# Patient Record
Sex: FEMALE | Race: WHITE | NOT HISPANIC OR LATINO | Employment: OTHER | URBAN - METROPOLITAN AREA
[De-identification: names, ages, dates, MRNs, and addresses within clinical notes are randomized per-mention and may not be internally consistent; named-entity substitution may affect disease eponyms.]

---

## 2017-03-31 ENCOUNTER — HOSPITAL ENCOUNTER (OUTPATIENT)
Dept: RADIOLOGY | Facility: MEDICAL CENTER | Age: 67
End: 2017-03-31
Attending: INTERNAL MEDICINE
Payer: COMMERCIAL

## 2017-03-31 DIAGNOSIS — Z00.00 ROUTINE GENERAL MEDICAL EXAMINATION AT A HEALTH CARE FACILITY: ICD-10-CM

## 2017-03-31 PROCEDURE — 4410556 CT-CARDIAC SCORING

## 2017-05-11 ENCOUNTER — HOSPITAL ENCOUNTER (OUTPATIENT)
Dept: RADIOLOGY | Facility: MEDICAL CENTER | Age: 67
End: 2017-05-11

## 2017-12-28 PROBLEM — M94.261 CHONDROMALACIA OF RIGHT KNEE: Status: ACTIVE | Noted: 2017-12-28

## 2017-12-28 PROBLEM — S83.241D OTHER TEAR OF MEDIAL MENISCUS, CURRENT INJURY, RIGHT KNEE, SUBSEQUENT ENCOUNTER: Status: ACTIVE | Noted: 2017-12-28

## 2018-06-14 ENCOUNTER — HOSPITAL ENCOUNTER (OUTPATIENT)
Dept: LAB | Facility: MEDICAL CENTER | Age: 68
End: 2018-06-14
Attending: INTERNAL MEDICINE
Payer: MEDICARE

## 2018-06-14 DIAGNOSIS — Z01.812 PRE-OPERATIVE LABORATORY EXAMINATION: ICD-10-CM

## 2018-06-14 DIAGNOSIS — Z01.810 PRE-OPERATIVE CARDIOVASCULAR EXAMINATION: ICD-10-CM

## 2018-06-14 LAB
ANION GAP SERPL CALC-SCNC: 6 MMOL/L (ref 0–11.9)
APTT PPP: 25.2 SEC (ref 24.7–36)
BASOPHILS # BLD AUTO: 1.2 % (ref 0–1.8)
BASOPHILS # BLD: 0.04 K/UL (ref 0–0.12)
BUN SERPL-MCNC: 14 MG/DL (ref 8–22)
CALCIUM SERPL-MCNC: 9 MG/DL (ref 8.5–10.5)
CHLORIDE SERPL-SCNC: 106 MMOL/L (ref 96–112)
CO2 SERPL-SCNC: 27 MMOL/L (ref 20–33)
CREAT SERPL-MCNC: 0.62 MG/DL (ref 0.5–1.4)
EKG IMPRESSION: NORMAL
EOSINOPHIL # BLD AUTO: 0.04 K/UL (ref 0–0.51)
EOSINOPHIL NFR BLD: 1.2 % (ref 0–6.9)
ERYTHROCYTE [DISTWIDTH] IN BLOOD BY AUTOMATED COUNT: 45 FL (ref 35.9–50)
EST. AVERAGE GLUCOSE BLD GHB EST-MCNC: 114 MG/DL
GLUCOSE SERPL-MCNC: 103 MG/DL (ref 65–99)
HBA1C MFR BLD: 5.6 % (ref 0–5.6)
HCT VFR BLD AUTO: 42.2 % (ref 37–47)
HGB BLD-MCNC: 14 G/DL (ref 12–16)
HIV 1+2 AB+HIV1 P24 AG SERPL QL IA: NON REACTIVE
IMM GRANULOCYTES # BLD AUTO: 0.01 K/UL (ref 0–0.11)
IMM GRANULOCYTES NFR BLD AUTO: 0.3 % (ref 0–0.9)
INR PPP: 0.88 (ref 0.87–1.13)
LYMPHOCYTES # BLD AUTO: 1.03 K/UL (ref 1–4.8)
LYMPHOCYTES NFR BLD: 29.9 % (ref 22–41)
MCH RBC QN AUTO: 29.6 PG (ref 27–33)
MCHC RBC AUTO-ENTMCNC: 33.2 G/DL (ref 33.6–35)
MCV RBC AUTO: 89.2 FL (ref 81.4–97.8)
MONOCYTES # BLD AUTO: 0.3 K/UL (ref 0–0.85)
MONOCYTES NFR BLD AUTO: 8.7 % (ref 0–13.4)
NEUTROPHILS # BLD AUTO: 2.02 K/UL (ref 2–7.15)
NEUTROPHILS NFR BLD: 58.7 % (ref 44–72)
NRBC # BLD AUTO: 0 K/UL
NRBC BLD-RTO: 0 /100 WBC
PLATELET # BLD AUTO: 203 K/UL (ref 164–446)
PMV BLD AUTO: 10.7 FL (ref 9–12.9)
POTASSIUM SERPL-SCNC: 3.7 MMOL/L (ref 3.6–5.5)
PROTHROMBIN TIME: 11.7 SEC (ref 12–14.6)
RBC # BLD AUTO: 4.73 M/UL (ref 4.2–5.4)
SCCMEC + MECA PNL NOSE NAA+PROBE: NEGATIVE
SCCMEC + MECA PNL NOSE NAA+PROBE: POSITIVE
SODIUM SERPL-SCNC: 139 MMOL/L (ref 135–145)
T3FREE SERPL-MCNC: 4.49 PG/ML (ref 2.4–4.2)
WBC # BLD AUTO: 3.4 K/UL (ref 4.8–10.8)

## 2018-06-14 PROCEDURE — 93010 ELECTROCARDIOGRAM REPORT: CPT | Performed by: INTERNAL MEDICINE

## 2018-06-14 PROCEDURE — 85730 THROMBOPLASTIN TIME PARTIAL: CPT

## 2018-06-14 PROCEDURE — 83036 HEMOGLOBIN GLYCOSYLATED A1C: CPT

## 2018-06-14 PROCEDURE — 36415 COLL VENOUS BLD VENIPUNCTURE: CPT

## 2018-06-14 PROCEDURE — 85025 COMPLETE CBC W/AUTO DIFF WBC: CPT

## 2018-06-14 PROCEDURE — 87641 MR-STAPH DNA AMP PROBE: CPT

## 2018-06-14 PROCEDURE — 87389 HIV-1 AG W/HIV-1&-2 AB AG IA: CPT

## 2018-06-14 PROCEDURE — 80048 BASIC METABOLIC PNL TOTAL CA: CPT

## 2018-06-14 PROCEDURE — 93005 ELECTROCARDIOGRAM TRACING: CPT

## 2018-06-14 PROCEDURE — 87640 STAPH A DNA AMP PROBE: CPT | Mod: XU

## 2018-06-14 PROCEDURE — 85610 PROTHROMBIN TIME: CPT

## 2018-06-14 PROCEDURE — 84481 FREE ASSAY (FT-3): CPT

## 2018-06-14 RX ORDER — LORAZEPAM 1 MG
2000 TABLET ORAL 2 TIMES DAILY
Status: ON HOLD | COMMUNITY
End: 2018-06-28

## 2018-06-14 RX ORDER — GLUCOSAMINE/CHONDR SU A SOD 750-600 MG
1 TABLET ORAL DAILY
COMMUNITY
End: 2023-04-17

## 2018-06-14 RX ORDER — MELOXICAM 7.5 MG/1
7.5 TABLET ORAL PRN
COMMUNITY
End: 2022-10-31

## 2018-06-14 RX ORDER — ESTRADIOL 0.05 MG/D
1 PATCH, EXTENDED RELEASE TRANSDERMAL
Status: ON HOLD | COMMUNITY
End: 2018-06-28

## 2018-06-14 NOTE — DISCHARGE PLANNING
DISCHARGE PLANNING NOTE - TOTAL JOINT     Procedure: Procedure(s):  KNEE ARTHROPLASTY TOTAL  Procedure Date: 6/27/2018  Insurance:  Payor: MEDICARE / Plan: MEDICARE PART A & B / Product Type: *No Product type* /   Equipment currently available at home? None, advised Care Chest  Steps into the home? 2  Steps within the home? 0  Toilet height? Standard  Type of shower? walk-in shower  Who will be with you during your recovery? spouse  Is Outpatient Physical Therapy set up after surgery? Yes   Did you take the Total Joint Class and where? Yes, at [facility]     Plan:

## 2018-06-14 NOTE — OR NURSING
"Preparing for your Procedure information\" sheet given to patient with verbal and written instructions. Patient instructed to continue prescribed medications through the day before surgery, instructed to take the following medications the day of surgery per anesthesia protocol  None.Pt denies any urinary frequency or burning. Pt instructed on use of CHG shower x3. MB  "

## 2018-06-27 ENCOUNTER — HOSPITAL ENCOUNTER (OUTPATIENT)
Facility: MEDICAL CENTER | Age: 68
End: 2018-06-28
Attending: ORTHOPAEDIC SURGERY | Admitting: ORTHOPAEDIC SURGERY
Payer: MEDICARE

## 2018-06-27 ENCOUNTER — APPOINTMENT (OUTPATIENT)
Dept: RADIOLOGY | Facility: MEDICAL CENTER | Age: 68
End: 2018-06-27
Attending: ORTHOPAEDIC SURGERY
Payer: MEDICARE

## 2018-06-27 DIAGNOSIS — M17.11 PRIMARY OSTEOARTHRITIS OF RIGHT KNEE: ICD-10-CM

## 2018-06-27 DIAGNOSIS — G89.18 POST-OP PAIN: ICD-10-CM

## 2018-06-27 PROCEDURE — 700111 HCHG RX REV CODE 636 W/ 250 OVERRIDE (IP)

## 2018-06-27 PROCEDURE — 700101 HCHG RX REV CODE 250: Performed by: PHYSICIAN ASSISTANT

## 2018-06-27 PROCEDURE — 502000 HCHG MISC OR IMPLANTS RC 0278: Performed by: ORTHOPAEDIC SURGERY

## 2018-06-27 PROCEDURE — 94760 N-INVAS EAR/PLS OXIMETRY 1: CPT

## 2018-06-27 PROCEDURE — 160048 HCHG OR STATISTICAL LEVEL 1-5: Performed by: ORTHOPAEDIC SURGERY

## 2018-06-27 PROCEDURE — 73560 X-RAY EXAM OF KNEE 1 OR 2: CPT | Mod: RT

## 2018-06-27 PROCEDURE — 160002 HCHG RECOVERY MINUTES (STAT): Performed by: ORTHOPAEDIC SURGERY

## 2018-06-27 PROCEDURE — 700111 HCHG RX REV CODE 636 W/ 250 OVERRIDE (IP): Performed by: PHYSICIAN ASSISTANT

## 2018-06-27 PROCEDURE — 160035 HCHG PACU - 1ST 60 MINS PHASE I: Performed by: ORTHOPAEDIC SURGERY

## 2018-06-27 PROCEDURE — 700101 HCHG RX REV CODE 250

## 2018-06-27 PROCEDURE — 160009 HCHG ANES TIME/MIN: Performed by: ORTHOPAEDIC SURGERY

## 2018-06-27 PROCEDURE — 502579 HCHG PACK, TOTAL KNEE: Performed by: ORTHOPAEDIC SURGERY

## 2018-06-27 PROCEDURE — 160036 HCHG PACU - EA ADDL 30 MINS PHASE I: Performed by: ORTHOPAEDIC SURGERY

## 2018-06-27 PROCEDURE — G0378 HOSPITAL OBSERVATION PER HR: HCPCS

## 2018-06-27 PROCEDURE — 700102 HCHG RX REV CODE 250 W/ 637 OVERRIDE(OP)

## 2018-06-27 PROCEDURE — 501838 HCHG SUTURE GENERAL: Performed by: ORTHOPAEDIC SURGERY

## 2018-06-27 PROCEDURE — 96375 TX/PRO/DX INJ NEW DRUG ADDON: CPT

## 2018-06-27 PROCEDURE — 96376 TX/PRO/DX INJ SAME DRUG ADON: CPT

## 2018-06-27 PROCEDURE — L8699 PROSTHETIC IMPLANT NOS: HCPCS | Performed by: ORTHOPAEDIC SURGERY

## 2018-06-27 PROCEDURE — A9270 NON-COVERED ITEM OR SERVICE: HCPCS | Performed by: PHYSICIAN ASSISTANT

## 2018-06-27 PROCEDURE — 160022 HCHG BLOCK: Performed by: ORTHOPAEDIC SURGERY

## 2018-06-27 PROCEDURE — 160041 HCHG SURGERY MINUTES - EA ADDL 1 MIN LEVEL 4: Performed by: ORTHOPAEDIC SURGERY

## 2018-06-27 PROCEDURE — 96365 THER/PROPH/DIAG IV INF INIT: CPT

## 2018-06-27 PROCEDURE — C1776 JOINT DEVICE (IMPLANTABLE): HCPCS | Performed by: ORTHOPAEDIC SURGERY

## 2018-06-27 PROCEDURE — 700105 HCHG RX REV CODE 258: Performed by: PHYSICIAN ASSISTANT

## 2018-06-27 PROCEDURE — 700102 HCHG RX REV CODE 250 W/ 637 OVERRIDE(OP): Performed by: PHYSICIAN ASSISTANT

## 2018-06-27 PROCEDURE — 160029 HCHG SURGERY MINUTES - 1ST 30 MINS LEVEL 4: Performed by: ORTHOPAEDIC SURGERY

## 2018-06-27 PROCEDURE — A9270 NON-COVERED ITEM OR SERVICE: HCPCS

## 2018-06-27 DEVICE — PATELLA GNS II RESURF  32MM: Type: IMPLANTABLE DEVICE | Site: KNEE | Status: FUNCTIONAL

## 2018-06-27 DEVICE — LGN PS HIGH FLEX XLPE SZ 3-4 10MM (1EA): Type: IMPLANTABLE DEVICE | Site: KNEE | Status: FUNCTIONAL

## 2018-06-27 DEVICE — CEMENT ORTHOPEDIC HV US  (10/PK): Type: IMPLANTABLE DEVICE | Site: KNEE | Status: FUNCTIONAL

## 2018-06-27 DEVICE — IMPLANT GNS II CMT TIB SIZE 4 RIGHT: Type: IMPLANTABLE DEVICE | Site: KNEE | Status: FUNCTIONAL

## 2018-06-27 DEVICE — IMPLANTABLE DEVICE: Type: IMPLANTABLE DEVICE | Site: KNEE | Status: FUNCTIONAL

## 2018-06-27 RX ORDER — ENEMA 19; 7 G/133ML; G/133ML
1 ENEMA RECTAL
Status: DISCONTINUED | OUTPATIENT
Start: 2018-06-27 | End: 2018-06-28 | Stop reason: HOSPADM

## 2018-06-27 RX ORDER — OXYCODONE HYDROCHLORIDE 10 MG/1
10 TABLET ORAL
Status: DISCONTINUED | OUTPATIENT
Start: 2018-06-27 | End: 2018-06-28 | Stop reason: HOSPADM

## 2018-06-27 RX ORDER — DEXTROSE MONOHYDRATE, SODIUM CHLORIDE, AND POTASSIUM CHLORIDE 50; 1.49; 4.5 G/1000ML; G/1000ML; G/1000ML
INJECTION, SOLUTION INTRAVENOUS CONTINUOUS
Status: DISCONTINUED | OUTPATIENT
Start: 2018-06-27 | End: 2018-06-28 | Stop reason: HOSPADM

## 2018-06-27 RX ORDER — BISACODYL 10 MG
10 SUPPOSITORY, RECTAL RECTAL
Status: DISCONTINUED | OUTPATIENT
Start: 2018-06-27 | End: 2018-06-28 | Stop reason: HOSPADM

## 2018-06-27 RX ORDER — CELECOXIB 200 MG/1
200 CAPSULE ORAL 2 TIMES DAILY WITH MEALS
Status: DISCONTINUED | OUTPATIENT
Start: 2018-06-28 | End: 2018-06-28 | Stop reason: HOSPADM

## 2018-06-27 RX ORDER — CELECOXIB 200 MG/1
CAPSULE ORAL
Status: COMPLETED
Start: 2018-06-27 | End: 2018-06-27

## 2018-06-27 RX ORDER — DIPHENHYDRAMINE HYDROCHLORIDE 50 MG/ML
25 INJECTION INTRAMUSCULAR; INTRAVENOUS EVERY 6 HOURS PRN
Status: DISCONTINUED | OUTPATIENT
Start: 2018-06-27 | End: 2018-06-28 | Stop reason: HOSPADM

## 2018-06-27 RX ORDER — ONDANSETRON 2 MG/ML
4 INJECTION INTRAMUSCULAR; INTRAVENOUS EVERY 4 HOURS PRN
Status: DISCONTINUED | OUTPATIENT
Start: 2018-06-27 | End: 2018-06-28 | Stop reason: HOSPADM

## 2018-06-27 RX ORDER — ACETAMINOPHEN 500 MG
750 TABLET ORAL EVERY 6 HOURS
Status: DISCONTINUED | OUTPATIENT
Start: 2018-06-27 | End: 2018-06-28 | Stop reason: HOSPADM

## 2018-06-27 RX ORDER — AMOXICILLIN 250 MG
1 CAPSULE ORAL
Status: DISCONTINUED | OUTPATIENT
Start: 2018-06-27 | End: 2018-06-28 | Stop reason: HOSPADM

## 2018-06-27 RX ORDER — TRANEXAMIC ACID
POWDER (GRAM) MISCELLANEOUS
Status: DISCONTINUED | OUTPATIENT
Start: 2018-06-27 | End: 2018-06-27 | Stop reason: HOSPADM

## 2018-06-27 RX ORDER — GABAPENTIN 300 MG/1
CAPSULE ORAL
Status: COMPLETED
Start: 2018-06-27 | End: 2018-06-27

## 2018-06-27 RX ORDER — ONDANSETRON 4 MG/1
4 TABLET, ORALLY DISINTEGRATING ORAL EVERY 4 HOURS PRN
Status: DISCONTINUED | OUTPATIENT
Start: 2018-06-27 | End: 2018-06-28 | Stop reason: HOSPADM

## 2018-06-27 RX ORDER — KETOROLAC TROMETHAMINE 30 MG/ML
INJECTION, SOLUTION INTRAMUSCULAR; INTRAVENOUS
Status: DISCONTINUED | OUTPATIENT
Start: 2018-06-27 | End: 2018-06-27 | Stop reason: HOSPADM

## 2018-06-27 RX ORDER — HYDROMORPHONE HYDROCHLORIDE 2 MG/ML
0.5 INJECTION, SOLUTION INTRAMUSCULAR; INTRAVENOUS; SUBCUTANEOUS
Status: DISCONTINUED | OUTPATIENT
Start: 2018-06-27 | End: 2018-06-28 | Stop reason: HOSPADM

## 2018-06-27 RX ORDER — HALOPERIDOL 5 MG/ML
INJECTION INTRAMUSCULAR
Status: COMPLETED
Start: 2018-06-27 | End: 2018-06-27

## 2018-06-27 RX ORDER — TRANEXAMIC ACID 100 MG/ML
INJECTION, SOLUTION INTRAVENOUS
Status: COMPLETED
Start: 2018-06-27 | End: 2018-06-27

## 2018-06-27 RX ORDER — SODIUM CHLORIDE, SODIUM LACTATE, POTASSIUM CHLORIDE, CALCIUM CHLORIDE 600; 310; 30; 20 MG/100ML; MG/100ML; MG/100ML; MG/100ML
INJECTION, SOLUTION INTRAVENOUS
Status: DISCONTINUED | OUTPATIENT
Start: 2018-06-27 | End: 2018-06-28 | Stop reason: HOSPADM

## 2018-06-27 RX ORDER — OXYCODONE HYDROCHLORIDE 5 MG/1
5 TABLET ORAL
Status: DISCONTINUED | OUTPATIENT
Start: 2018-06-27 | End: 2018-06-28 | Stop reason: HOSPADM

## 2018-06-27 RX ORDER — EPINEPHRINE 1 MG/ML(1)
AMPUL (ML) INJECTION
Status: DISCONTINUED | OUTPATIENT
Start: 2018-06-27 | End: 2018-06-27 | Stop reason: HOSPADM

## 2018-06-27 RX ORDER — ACETAMINOPHEN 500 MG
TABLET ORAL
Status: COMPLETED
Start: 2018-06-27 | End: 2018-06-27

## 2018-06-27 RX ORDER — AMOXICILLIN 250 MG
1 CAPSULE ORAL NIGHTLY
Status: DISCONTINUED | OUTPATIENT
Start: 2018-06-27 | End: 2018-06-28 | Stop reason: HOSPADM

## 2018-06-27 RX ORDER — LORATADINE 10 MG/1
10 TABLET ORAL PRN
Status: DISCONTINUED | OUTPATIENT
Start: 2018-06-27 | End: 2018-06-28 | Stop reason: HOSPADM

## 2018-06-27 RX ORDER — DIPHENHYDRAMINE HCL 25 MG
25 TABLET ORAL EVERY 6 HOURS PRN
Status: DISCONTINUED | OUTPATIENT
Start: 2018-06-27 | End: 2018-06-28 | Stop reason: HOSPADM

## 2018-06-27 RX ORDER — SCOLOPAMINE TRANSDERMAL SYSTEM 1 MG/1
1 PATCH, EXTENDED RELEASE TRANSDERMAL
Status: DISCONTINUED | OUTPATIENT
Start: 2018-06-27 | End: 2018-06-28 | Stop reason: HOSPADM

## 2018-06-27 RX ORDER — DIPHENHYDRAMINE HCL 25 MG
25 TABLET ORAL NIGHTLY PRN
Status: DISCONTINUED | OUTPATIENT
Start: 2018-06-28 | End: 2018-06-28 | Stop reason: HOSPADM

## 2018-06-27 RX ORDER — KETOROLAC TROMETHAMINE 30 MG/ML
15 INJECTION, SOLUTION INTRAMUSCULAR; INTRAVENOUS EVERY 6 HOURS
Status: COMPLETED | OUTPATIENT
Start: 2018-06-27 | End: 2018-06-28

## 2018-06-27 RX ORDER — ROPIVACAINE HYDROCHLORIDE 5 MG/ML
INJECTION, SOLUTION EPIDURAL; INFILTRATION; PERINEURAL
Status: DISCONTINUED | OUTPATIENT
Start: 2018-06-27 | End: 2018-06-27 | Stop reason: HOSPADM

## 2018-06-27 RX ORDER — DOCUSATE SODIUM 100 MG/1
100 CAPSULE, LIQUID FILLED ORAL 2 TIMES DAILY
Status: DISCONTINUED | OUTPATIENT
Start: 2018-06-27 | End: 2018-06-28 | Stop reason: HOSPADM

## 2018-06-27 RX ORDER — TRAMADOL HYDROCHLORIDE 50 MG/1
50 TABLET ORAL EVERY 4 HOURS PRN
Status: DISCONTINUED | OUTPATIENT
Start: 2018-06-27 | End: 2018-06-28 | Stop reason: HOSPADM

## 2018-06-27 RX ORDER — POLYETHYLENE GLYCOL 3350 17 G/17G
1 POWDER, FOR SOLUTION ORAL 2 TIMES DAILY PRN
Status: DISCONTINUED | OUTPATIENT
Start: 2018-06-27 | End: 2018-06-28 | Stop reason: HOSPADM

## 2018-06-27 RX ORDER — LIDOCAINE HYDROCHLORIDE 10 MG/ML
INJECTION, SOLUTION EPIDURAL; INFILTRATION; INTRACAUDAL; PERINEURAL
Status: COMPLETED
Start: 2018-06-27 | End: 2018-06-27

## 2018-06-27 RX ORDER — OXYCODONE HCL 5 MG/5 ML
SOLUTION, ORAL ORAL
Status: COMPLETED
Start: 2018-06-27 | End: 2018-06-27

## 2018-06-27 RX ORDER — DIAZEPAM 5 MG/1
5 TABLET ORAL EVERY 6 HOURS PRN
Status: DISCONTINUED | OUTPATIENT
Start: 2018-06-27 | End: 2018-06-28 | Stop reason: HOSPADM

## 2018-06-27 RX ORDER — HALOPERIDOL 5 MG/ML
1 INJECTION INTRAMUSCULAR EVERY 6 HOURS PRN
Status: DISCONTINUED | OUTPATIENT
Start: 2018-06-27 | End: 2018-06-28 | Stop reason: HOSPADM

## 2018-06-27 RX ADMIN — LIDOCAINE HYDROCHLORIDE 0.5 ML: 10 INJECTION, SOLUTION EPIDURAL; INFILTRATION; INTRACAUDAL; PERINEURAL at 10:30

## 2018-06-27 RX ADMIN — Medication 1 G: at 12:21

## 2018-06-27 RX ADMIN — GABAPENTIN 300 MG: 300 CAPSULE ORAL at 10:30

## 2018-06-27 RX ADMIN — ACETAMINOPHEN 1000 MG: 500 TABLET, COATED ORAL at 10:30

## 2018-06-27 RX ADMIN — ASPIRIN 81 MG: 81 TABLET, COATED ORAL at 22:06

## 2018-06-27 RX ADMIN — SODIUM CHLORIDE 2 G: 9 INJECTION, SOLUTION INTRAVENOUS at 22:05

## 2018-06-27 RX ADMIN — KETOROLAC TROMETHAMINE 15 MG: 30 INJECTION, SOLUTION INTRAMUSCULAR at 23:45

## 2018-06-27 RX ADMIN — TRANEXAMIC ACID 1000 MG: 100 INJECTION, SOLUTION INTRAVENOUS at 14:36

## 2018-06-27 RX ADMIN — CELECOXIB 200 MG: 200 CAPSULE ORAL at 10:35

## 2018-06-27 RX ADMIN — SENNOSIDES AND DOCUSATE SODIUM 1 TABLET: 8.6; 5 TABLET ORAL at 22:06

## 2018-06-27 RX ADMIN — ACETAMINOPHEN 750 MG: 500 TABLET, FILM COATED ORAL at 23:42

## 2018-06-27 RX ADMIN — SODIUM CHLORIDE, SODIUM LACTATE, POTASSIUM CHLORIDE, CALCIUM CHLORIDE: 600; 310; 30; 20 INJECTION, SOLUTION INTRAVENOUS at 10:30

## 2018-06-27 RX ADMIN — HALOPERIDOL LACTATE 1 MG: 5 INJECTION, SOLUTION INTRAMUSCULAR at 13:50

## 2018-06-27 RX ADMIN — ACETAMINOPHEN 750 MG: 500 TABLET, FILM COATED ORAL at 18:02

## 2018-06-27 RX ADMIN — POTASSIUM CHLORIDE, DEXTROSE MONOHYDRATE AND SODIUM CHLORIDE: 150; 5; 450 INJECTION, SOLUTION INTRAVENOUS at 18:03

## 2018-06-27 RX ADMIN — KETOROLAC TROMETHAMINE 15 MG: 30 INJECTION, SOLUTION INTRAMUSCULAR at 18:03

## 2018-06-27 RX ADMIN — OXYCODONE HYDROCHLORIDE 5 MG: 5 SOLUTION ORAL at 15:20

## 2018-06-27 ASSESSMENT — PAIN SCALES - GENERAL
PAINLEVEL_OUTOF10: 0
PAINLEVEL_OUTOF10: 4
PAINLEVEL_OUTOF10: ASSUMED PAIN PRESENT
PAINLEVEL_OUTOF10: ASSUMED PAIN PRESENT
PAINLEVEL_OUTOF10: 2

## 2018-06-27 ASSESSMENT — PATIENT HEALTH QUESTIONNAIRE - PHQ9
2. FEELING DOWN, DEPRESSED, IRRITABLE, OR HOPELESS: NOT AT ALL
1. LITTLE INTEREST OR PLEASURE IN DOING THINGS: NOT AT ALL
SUM OF ALL RESPONSES TO PHQ9 QUESTIONS 1 AND 2: 0

## 2018-06-27 ASSESSMENT — COPD QUESTIONNAIRES
HAVE YOU SMOKED AT LEAST 100 CIGARETTES IN YOUR ENTIRE LIFE: NO/DON'T KNOW
DURING THE PAST 4 WEEKS HOW MUCH DID YOU FEEL SHORT OF BREATH: NONE/LITTLE OF THE TIME
COPD SCREENING SCORE: 2
DO YOU EVER COUGH UP ANY MUCUS OR PHLEGM?: NO/ONLY WITH OCCASIONAL COLDS OR INFECTIONS

## 2018-06-27 ASSESSMENT — LIFESTYLE VARIABLES
EVER_SMOKED: NEVER
EVER_SMOKED: NEVER
ALCOHOL_USE: NO

## 2018-06-27 NOTE — OP REPORT
Date of Surgery:  6-17-18  Pre-operative Diagnosis:  right knee arthritis    Post-operative Diagnosis:  right knee arthritis    Procedure:  right knee replacement    Implants used:  A Smith Nephew Legion PS total knee arthroplasty system with the following components  Femur: 5 Narrow Oxinium PS  Tibia: 4   Polyethylene: 10 mm PS  Patella: 32 round  Fixation: 2 packages of Simplex HV    Surgeon:  Sindi Ochoa MD    1st Assistant:   WAN Garcia    Anesthesiologist:   Kole    EBL:  Minimal     Specimen:  None    Findings: Severe tricompartmental OA; scar tissue in lateral gutter, suprapatellar loose bodies    Indications for procedure:  This patient is a 67 y.o. female with a long standing history of right knee arthritis. The patient had failed conservative therapy including anti-inflammatory medications, activity modifications, injections, physical therapy and assistive devices. She was indicated for a right total knee replacement. The patient expressed an understanding of the risks of pain, bleeding, infection, dislocation, malalignment, need for further surgery, damage to surrounding structures, neurovascular compromise, blood clots, leg-length discrepancy both apparent and actual, anesthetic complications, and serious medical consequences including but not limited to heart attack, stroke or even death. It was explained that the implants are man-made products and thus subject to possible failure.  The patient expressed an understanding and wished to proceed. Specific risks based on the patient's medical history were addressed. A clearance was obtained by the medical physicians and the patient was taken to the operating room on the day of surgery for the above named procedure.     Description of procedure:  The patient was met in the pre-operative holding area. The right knee was marked as the appropriate surgical site with indelible ink. They were taken to the operating room where the anesthesia department  "started a adductor/general for intraoperative and post-operative anesthesia and pain control. The patient was placed on the OR table and a tourniquet was placed high on the operative thigh. All bony prominences were padded appropriately. The right knee was prepped and draped in a standard sterile surgical fashion. A time out procedure was called to verify the side and site of surgery, the proposed surgical procedure, and the administration of pre-operative antibiotics. After successful completion of the time out procedure, our attention was turned to the right knee.  The tourniquet was inflated after exsanguination with an Esmarch bandage. The knee was flexed and a straight incision was made just medial to the midline. The capsule of the knee was cleared and a midvastus arthrotomy was performed. The patella was subluxated laterally and a medial release was performed to properly visualize the posteromedial aspect of the tibial plateau. The knee was extended, the patellar tendon was protected and the infrapatellar fat pad was excised. A lateral release was performed. The patella was turned on its side and held in place with two penetrating towel clips. A free-hand patellar cut was made, the patella was sized and the appropriate lug holes were drilled. The patella was subluxed, the knee was flexed. The tourniquet was released. Hemostasis was obtained. Each hemijoint was cleared of soft tissue. We noted several small loose bodies in the suprapatellar pouch which were removed. The ACL was removed. The femoral canal was entered with the step drill and the distal femoral cutting guide was pinned in place in 6 degrees of valgus. The distal cut was made and the bony pieces were removed. The femur was sized to a size 5 and the appropriate cutting jig was pinned in place in 3 degrees of external rotation. The bony cuts were made, we noted a \"grand piano sign\" anteriorly. The box for the PS post was cut in the standard fashion. " The patient's anatomy was noted to be appropriate for the Narrow implant, and this was chosen   Now the femur was retracted posteriorly with a lap sponge to protect the intercondylar area. The proximal tibia was exposed, the depth of our resection was based on taking 2 mm off the low side of the bone. We used the intramedullary drill to enter the tibial canal and set our alignment based on the patient's anatomy.  We first set our depth and then our checked our varus/valgus alignment with the extramedullary guide kaitlynn. We made our bony cuts and removed the tibial piece en bloc. The laminar spreaders were placed and the hypercurved osteotomes were used to remove posterior femoral osteophytes. The ligaments were balanced in flexion and extension.   The tibia was then sized to a size 4 and the trial component was placed in the proper amount of external rotation. A trial femoral component was placed and with the 10 mm PS polyethylene we noted full extension without recurvatum and greater than 125 degrees of flexion with appropriate patellar tracking. At this point we removed the trial components and thoroughly irrigated the wound. Osteophytes were removed. The tourniquet was reinflated.  The real components were opened in a sterile fashion on the back table and then cemented into place sequentially, first the femur, then the tibia, then the patella. The cement was allowed to cure with the trial polyethylene component in place. After the cement had hardened and all excess cement was removed, the knee was taken through a range of motion. Again we noted full extension and greater than 125 degrees of flexion with appropriate varus/valgus stability and patellar tracking. Therefore the real polyethylene was opened and inserted into the tibial tray. An audible click was heard as it engaged the tibia. Now a dilute betadine solution with 3 grams of tranexamic acid was instilled into the knee for 3 minutes before being  pulse-lavaged out. The knee was flexed, the arthrotomy was closed with #1 Quill, followed by 2-0 Vicryl in the deep dermal layer in a buried interrupted fashion. The skin was closed with 3-0 monocryl in a running subcuticular fashion, and a sterile Prevena dressing was applied. The patient is currently in the operating room awaiting reversal of anesthesia.

## 2018-06-27 NOTE — PROGRESS NOTES
Pt to floor.  Pt is alert and oriented x 4.  Initial VSS.  R knee incision CDI, Prevena in place, polar ice applied. Pain and nausea tolerable. +1 pedal pulse.  Pt able to pull leg up and move foot slightly.  CO numbness.  Updated w/ POC. WIll continue with care.

## 2018-06-27 NOTE — OR NURSING
"1339: Pt arrived post R total knee procedure, Pt awake upon arrival/groggy, Respirations unlabored/sats approp on NC, Anesthesia report/OR RN had off, Pt has ace wrap to R knee/Prevena in place, +L pedal pulse, Pt did have R add canal nerve block/able to move foot slightly and pull leg up /states \"leg feels numb,\" IVF infusing/SCD's in use  1350: Pain is minimal, Slight nausea/medicated  1405: Nausea improved, Pain is minimal, VSS  "

## 2018-06-27 NOTE — OR NURSING
1515: Radiology here, Pain increasing slightly-rates 4/10-medicated/see MAR  1530: Pt meets criteria for [facility] transfer, Attempting to call report, VSS, No nausea/pain min and tolerable

## 2018-06-27 NOTE — OR NURSING
1427- recieved report from Martell RESENDIZ. Pt resting with eyes closed.  1459- report to Martell RESENDIZ.

## 2018-06-27 NOTE — OR NURSING
1004: Brought patient back to pre-op and assumed care.  1103: Patient allergies and NPO status verified, home medication reconciliation completed and belongings secured. Patient verbalizes understanding of pain scale, expected course of stay and plan of care. Surgical site verified with patient. IV access established. Sequentials placed on legs.

## 2018-06-28 VITALS
HEIGHT: 63 IN | TEMPERATURE: 98.7 F | DIASTOLIC BLOOD PRESSURE: 46 MMHG | OXYGEN SATURATION: 100 % | WEIGHT: 151.9 LBS | SYSTOLIC BLOOD PRESSURE: 118 MMHG | BODY MASS INDEX: 26.91 KG/M2 | HEART RATE: 64 BPM | RESPIRATION RATE: 18 BRPM

## 2018-06-28 PROBLEM — M17.11 PRIMARY OSTEOARTHRITIS OF RIGHT KNEE: Status: ACTIVE | Noted: 2018-06-28

## 2018-06-28 PROBLEM — G89.18 POST-OP PAIN: Status: ACTIVE | Noted: 2018-06-28

## 2018-06-28 LAB
HCT VFR BLD AUTO: 37 % (ref 37–47)
HGB BLD-MCNC: 12.1 G/DL (ref 12–16)

## 2018-06-28 PROCEDURE — 97165 OT EVAL LOW COMPLEX 30 MIN: CPT

## 2018-06-28 PROCEDURE — G8978 MOBILITY CURRENT STATUS: HCPCS | Mod: CJ

## 2018-06-28 PROCEDURE — 96376 TX/PRO/DX INJ SAME DRUG ADON: CPT

## 2018-06-28 PROCEDURE — 36415 COLL VENOUS BLD VENIPUNCTURE: CPT

## 2018-06-28 PROCEDURE — 97535 SELF CARE MNGMENT TRAINING: CPT

## 2018-06-28 PROCEDURE — G8989 SELF CARE D/C STATUS: HCPCS | Mod: CJ

## 2018-06-28 PROCEDURE — A9270 NON-COVERED ITEM OR SERVICE: HCPCS | Performed by: PHYSICIAN ASSISTANT

## 2018-06-28 PROCEDURE — 85018 HEMOGLOBIN: CPT

## 2018-06-28 PROCEDURE — 700111 HCHG RX REV CODE 636 W/ 250 OVERRIDE (IP): Performed by: PHYSICIAN ASSISTANT

## 2018-06-28 PROCEDURE — G8979 MOBILITY GOAL STATUS: HCPCS | Mod: CJ

## 2018-06-28 PROCEDURE — 85014 HEMATOCRIT: CPT

## 2018-06-28 PROCEDURE — G8987 SELF CARE CURRENT STATUS: HCPCS | Mod: CJ

## 2018-06-28 PROCEDURE — 700102 HCHG RX REV CODE 250 W/ 637 OVERRIDE(OP): Performed by: PHYSICIAN ASSISTANT

## 2018-06-28 PROCEDURE — 700112 HCHG RX REV CODE 229: Performed by: PHYSICIAN ASSISTANT

## 2018-06-28 PROCEDURE — G8980 MOBILITY D/C STATUS: HCPCS | Mod: CJ

## 2018-06-28 PROCEDURE — 97161 PT EVAL LOW COMPLEX 20 MIN: CPT

## 2018-06-28 PROCEDURE — G8988 SELF CARE GOAL STATUS: HCPCS | Mod: CJ

## 2018-06-28 PROCEDURE — 700105 HCHG RX REV CODE 258: Performed by: PHYSICIAN ASSISTANT

## 2018-06-28 PROCEDURE — G0378 HOSPITAL OBSERVATION PER HR: HCPCS

## 2018-06-28 RX ORDER — OXYCODONE HYDROCHLORIDE 5 MG/1
5 TABLET ORAL EVERY 8 HOURS PRN
Qty: 80 TAB | Refills: 0 | Status: SHIPPED | OUTPATIENT
Start: 2018-06-28 | End: 2018-07-12

## 2018-06-28 RX ORDER — ASPIRIN 81 MG/1
81 TABLET ORAL 2 TIMES DAILY
Qty: 60 TAB | Refills: 0 | Status: SHIPPED | OUTPATIENT
Start: 2018-06-28 | End: 2022-06-29

## 2018-06-28 RX ORDER — ONDANSETRON 4 MG/1
4 TABLET, ORALLY DISINTEGRATING ORAL EVERY 4 HOURS PRN
Qty: 10 TAB | Refills: 0 | Status: SHIPPED | OUTPATIENT
Start: 2018-06-28 | End: 2022-10-31

## 2018-06-28 RX ORDER — ACETAMINOPHEN 500 MG
750 TABLET ORAL EVERY 6 HOURS
Qty: 30 TAB | Refills: 0 | Status: SHIPPED | OUTPATIENT
Start: 2018-06-28 | End: 2023-01-17

## 2018-06-28 RX ORDER — DIAZEPAM 5 MG/1
5 TABLET ORAL EVERY 8 HOURS PRN
Qty: 60 TAB | Refills: 0 | Status: SHIPPED | OUTPATIENT
Start: 2018-06-28 | End: 2018-07-12

## 2018-06-28 RX ORDER — TRAMADOL HYDROCHLORIDE 50 MG/1
50-100 TABLET ORAL EVERY 8 HOURS PRN
Qty: 80 TAB | Refills: 2 | Status: SHIPPED | OUTPATIENT
Start: 2018-06-28 | End: 2018-07-12

## 2018-06-28 RX ORDER — PSEUDOEPHEDRINE HCL 30 MG
100 TABLET ORAL 2 TIMES DAILY
Qty: 60 CAP | Refills: 0 | Status: SHIPPED | OUTPATIENT
Start: 2018-06-28 | End: 2022-10-31

## 2018-06-28 RX ADMIN — KETOROLAC TROMETHAMINE 15 MG: 30 INJECTION, SOLUTION INTRAMUSCULAR at 12:22

## 2018-06-28 RX ADMIN — OXYCODONE HYDROCHLORIDE 5 MG: 5 TABLET ORAL at 12:18

## 2018-06-28 RX ADMIN — KETOROLAC TROMETHAMINE 15 MG: 30 INJECTION, SOLUTION INTRAMUSCULAR at 06:41

## 2018-06-28 RX ADMIN — DOCUSATE SODIUM 100 MG: 100 CAPSULE, LIQUID FILLED ORAL at 06:40

## 2018-06-28 RX ADMIN — ASPIRIN 81 MG: 81 TABLET, COATED ORAL at 09:56

## 2018-06-28 RX ADMIN — ACETAMINOPHEN 750 MG: 500 TABLET, FILM COATED ORAL at 12:19

## 2018-06-28 RX ADMIN — OXYCODONE HYDROCHLORIDE 5 MG: 5 TABLET ORAL at 03:58

## 2018-06-28 RX ADMIN — SODIUM CHLORIDE 2 G: 9 INJECTION, SOLUTION INTRAVENOUS at 03:58

## 2018-06-28 ASSESSMENT — PAIN SCALES - GENERAL
PAINLEVEL_OUTOF10: 5
PAINLEVEL_OUTOF10: 3
PAINLEVEL_OUTOF10: 2

## 2018-06-28 ASSESSMENT — GAIT ASSESSMENTS
DISTANCE (FEET): 200
ASSISTIVE DEVICE: FRONT WHEEL WALKER
GAIT LEVEL OF ASSIST: STAND BY ASSIST

## 2018-06-28 ASSESSMENT — ACTIVITIES OF DAILY LIVING (ADL): TOILETING: INDEPENDENT

## 2018-06-28 NOTE — DISCHARGE SUMMARY
Edilma Taylor was admitted on 6/27/2018 for ITBS AND KNEE PAIN  Iliotibial band syndrome of right side  Right knee pain  Iliotibial band syndrome of right side  Right knee pain  Iliotibial band syndrome of right side  Right knee pain  Patient was diagnosed with severe degenerative arthritis in the right knee and underwent a right total knee arthroplasty by Dr. Sindi Ochoa on the date of admission. Please see dictated operative note for further information.    Hospital course: standard    The patient has done well, with no complications.  Patient denies chest pain, calf pain or shortness of breath.   Pain is well-controlled at present.  Patient is ambulating well with the use of an assistive device, and progressing in physical therapy.   Patient is neurologically and vascularly intact with palpable pedal pulses bilaterally.   Narcotic dosages were chosen by taking into account the the patient's previous history of opoid use and to ensure proper pain control after surgery    Discharge date: 6-28-18    Patient is being discharged to home after am physical therapy.     Allergies:  Penicillins; Morphine; Sulfa drugs; and Kenalog       Medication List      START taking these medications      Instructions   acetaminophen 500 MG Tabs  Commonly known as:  TYLENOL   Take 1.5 Tabs by mouth every 6 hours.  Dose:  750 mg     aspirin 81 MG EC tablet   Take 1 Tab by mouth 2 times a day.  Dose:  81 mg     diazePAM 5 MG Tabs  Commonly known as:  VALIUM   Take 1 Tab by mouth every 8 hours as needed (for muscle spasms) for up to 14 days.  Dose:  5 mg     docusate sodium 100 MG Caps   Take 100 mg by mouth 2 times a day.  Dose:  100 mg     ondansetron 4 MG Tbdp  Commonly known as:  ZOFRAN ODT   Take 1 Tab by mouth every four hours as needed for Nausea.  Dose:  4 mg     oxyCODONE immediate-release 5 MG Tabs  Commonly known as:  ROXICODONE   Take 1 Tab by mouth every 8 hours as needed (for pain; can be alternated with Tramadol  for pain control) for up to 14 days.  Dose:  5 mg     tramadol 50 MG Tabs  Commonly known as:  ULTRAM   Take 1-2 Tabs by mouth every 8 hours as needed (for pain; can be alternated with Oxycodone for pain control) for up to 14 days.  Dose:   mg        CONTINUE taking these medications      Instructions   ascorbic acid 500 MG Tabs  Commonly known as:  ascorbic acid   Take 1,000 mg by mouth every day.  Dose:  1000 mg     Grape Seed 250-50 MG Caps   Take 1 Tab by mouth every day.  Dose:  1 Tab     JOINT FORMULA PO   Take 1 Tab by mouth every day.  Dose:  1 Tab     loratadine 10 MG Tabs  Commonly known as:  CLARITIN   Take 10 mg by mouth as needed.  Dose:  10 mg     meloxicam 7.5 MG Tabs  Commonly known as:  MOBIC   Take 7.5 mg by mouth as needed.  Dose:  7.5 mg     NATURE-THROID 65 MG tablet  Generic drug:  thyroid   Take 65 mg by mouth every day.  Dose:  65 mg     Non Formulary Request   Take 1 Cap by mouth every day. EYE VITAMIN  Dose:  1 Cap     Non Formulary Request   Take 1 Cap by mouth every day. VITAMIN D + CALCIUM  Dose:  1 Cap     PROBIOTIC DAILY PO   Take 1 Cap by mouth every day.  Dose:  1 Cap     * therapeutic multivitamin-minerals Tabs   Take 1 Tab by mouth every day.  Dose:  1 Tab     * HAIR SKIN AND NAILS FORMULA PO   Take 3 Tabs by mouth every day.  Dose:  3 Tab        * This list has 2 medication(s) that are the same as other medications prescribed for you. Read the directions carefully, and ask your doctor or other care provider to review them with you.            STOP taking these medications    CLA 1000 MG Caps     estradiol 0.05 MG/24HR Pttw  Commonly known as:  VIVELLE DOT     TURMERIC PO            Discharge Instructions:     Patient is instructed to ambulate and weight bear as tolerated with the use of an assistive device, and to continue physical therapy exercises given during this hospital stay. Strict posterior hip precautions are to be observed for patients who underwent a total hip  replacement.   Patient is to ice and elevate the surgical leg regularly, with pillows under the ankle, nothing is to be placed under the knee.   Patient was given detailed wound care instructions, and will leave the silver dressing on until first post-op visit.   ASA twice daily for DVT prophylaxis.  Patient is to follow up with Dr. Ochoa's office in 1-2 weeks.

## 2018-06-28 NOTE — PROGRESS NOTES
S:  s/p right TKA  Pain controlled  No N/V  No Chest Pain/SOB  Afebrile  Exam:    NAD A& O x3    RLE: +DF/PF/EHL SILT L4/L5/S1, motor function improved from immediate post op exam  Compartments soft  LLE:  +DF/PF/EHL SILT L4/L5/S1    Plan:   Continue standard plan of care  Weight bearing: as tolerated with walker/cane  DVT prophylaxis: SCD/Teds + ASA  Dispo: home today after PT

## 2018-06-28 NOTE — CARE PLAN
Problem: Venous Thromboembolism (VTW)/Deep Vein Thrombosis (DVT) Prevention:  Goal: Patient will participate in Venous Thrombosis (VTE)/Deep Vein Thrombosis (DVT)Prevention Measures  Outcome: PROGRESSING AS EXPECTED   06/27/18 2100 06/27/18 2300   Mechanical/VTE Prophylaxis   Mechanical Prophylaxis  --  SCDs, Sequential Compression Device;AV Foot Pumps   AV Foot Pumps --  On   SCDs, Sequential Compression Device --  On   OTHER   Risk Assessment Score 2 --    VTE RISK Moderate --    Pharmacologic Prophylaxis Used (ASA) --        Problem: Pain Management  Goal: Pain level will decrease to patient's comfort goal  Outcome: PROGRESSING AS EXPECTED   06/28/18 0321   OTHER   Non Verbal Scale  Calm;Sleeping;Unlabored Breathing

## 2018-06-28 NOTE — PROGRESS NOTES
Post op check  s/p right TKA  Pain controlled  Some numbness distally  NAD  Weak dorsi/plantarflexion  Toes warm/well perfused  Compartments soft  Continue post op protocol, ok to ambulate with assistance and walker.

## 2018-06-28 NOTE — PROGRESS NOTES
"Total Joint Replacement Program Rounding     Inpatient bedside rounding completed to address quality of care provided by total joint replacement program IDT and overall patient experience at [facility].    Patient Satisfaction addressed. Patient/family updated on POC during hospital stay.  No further questions/concerns at this time. Please see \"MyRounding\" for further details of rounding discussion. RN updated.         "

## 2018-06-28 NOTE — DISCHARGE INSTRUCTIONS
Dr. Ochoa's Discharge Instructions:   The first week after your joint replacement is a time to recover from the surgery. We expect light exercise to keep you active and mobile. Dr. Ochoa requires a walker or cane for most patients in the first few days following surgery to try to prevent falls or complications.     Most patients are prescribed two medications for pain control: a narcotic such as Oxycodone or Hydrocodone and a milder medication called Tramadol. These can be alternated for pain control, and the priority is to decrease use of the stronger narcotic as soon as tolerated.   Take your pain medication as appropriate to ensure that your pain is not limiting your recovery. You'll be seen in clinic in 7-10 days (this appointment has already been made, call our office if you're unsure of the time). At that time, we'll prescribe your physical therapy to help with the recovery phase.     -Keep dressing clean and dry. Leave dressing in place until follow up. If you have an incisional vac dressing, the battery may die before your first post operative appointment, but you can leave the surgical dressing in place and it will be removed at your clinic visit. Call the office if you notice drainage from the surgical dressing.     -OK to shower, keep dressing in place. Pat dressing dry, do not rub incision     -Do not soak incision in bath, hot tub or pool     -Follow up with Dr. Ochoa at regularly scheduled time     -Call [facility] office at [phone] for questions     -Weight bearing status: as tolerated with walker/cane for support     -Take medication as prescribed for DVT prophylaxis    Discharge Instructions    Discharged to home by car with relative. Discharged via wheelchair, hospital escort: Yes.  Special equipment needed: Not Applicable    Be sure to schedule a follow-up appointment with your primary care doctor or any specialists as instructed.     Discharge Plan:   Diet Plan: Discussed  Activity Level:  Discussed  Confirmed Follow up Appointment: Patient to Call and Schedule Appointment  Confirmed Symptoms Management: Discussed  Medication Reconciliation Updated: Yes    I understand that a diet low in cholesterol, fat, and sodium is recommended for good health. Unless I have been given specific instructions below for another diet, I accept this instruction as my diet prescription.   Other diet: Regular    Special Instructions: Discharge instructions for the Orthopedic Patient    Follow up with Primary Care Physician within 2 weeks of discharge to home, regarding:  Review of medications and diagnostic testing.  Surveillance for medical complications.  Workup and treatment of osteoporosis, if appropriate.     -Is this a Joint Replacement patient? Yes Total Joint Knee Replacement Discharge Instructions    Pain  - The goal is to slowly wean off the prescription pain medicine.  - Ice can be used for pain control.  20 minutes at a time is recommended, and never directly against your skin or incision.  - Most patients are off the pain pills by 3 weeks; others may require a low level of pain medications for many months.  If your pain continues to be severe, follow up with your physician.  Infection    Knee joint infections; occur in fewer than 2% of patients. The most common causes of infection following total knee replacement surgery are from bacteria that enter the bloodstream during dental procedures, urinary tract infections, or skin infections. These bacteria can lodge around your knee replacement and cause an infection.  - Keep the incision as clean and dry as possible.  - Always wash your hands before touching your incision.  - Skin infections tend to develop around 7-10 days after surgery; most can be treated with oral antibiotics.  - Dental Care should be delayed for 3 months after surgery, your surgeon recommends taking a dose of antibiotics 1 hour prior to any dental procedure. After 2 years, most surgeons  recommend antibiotics only before an extensive procedure.  Ask your surgeon what he recommends.  - Signs and symptoms of infection can include:  low grade fever, redness, pain, swelling and drainage from your incision.  Notify your surgeon immediately if you develop any of these symptoms.  Other instructions  - Bowel habits - constipation is extremely common and is caused by a combination of anesthesia, lack of mobility and pain medicine.  Use stool softeners or laxatives if necessary. It is important not to ignore this problem, as bowel obstructions can be a serious complication after joint replacement surgery.  - Mood/Energy Level - Many patients experience a lack of energy and endurance for up to 2-3 months after surgery.  Some may also feel down and can even become depressed.  This is likely due to the postoperative anemia, change in activity level, lack of sleep, pain medicine and just the emotional reaction to the surgery itself that is a big disruption in a person’s life.  This usually passes.  If symptoms persist, follow up with your primary physician.  - Returning to work - Your surgeon will give you more specific instructions. Depending on the type of activities you perform, it may be 6 to 8 weeks before you return to work.   Generally, if you work a sedentary job requiring little standing or walking, most patients may return within 2-6 weeks.  Manual labor jobs involving walking, lifting and standing may take longer. Your surgeon’s office can provide a release to part-time or light duty work early on in your recovery and progress you to full duty as able.    - Driving - If your left knee was replaced and you have an automatic transmission, you may be able to begin driving in a week or so, provided you are no longer taking narcotic pain medication. If your right knee was replaced, avoid driving for 6 to 8 weeks. Remember that your reflexes may not be as sharp as before your surgery. Ask your surgeon for  specific instructions.   - Avoiding falls - A fall during the first few weeks after surgery can damage your new knee and may result in a need for further surgery.   throw rugs and tack down loose carpeting.  Be aware of floor hazards such as pets, small objects or uneven surfaces.    - [employer] Metal Detectors - The sensitivity of metal detectors varies and it is likely that your prosthesis will cause an alarm.  Inform the  of your artificial joint.  Diet  - Resume your normal diet as tolerated.  - It is important to achieve a healthy nutritional status by eating a well balanced diet on a regular basis.  - Your physician may recommend that you take iron and vitamin supplements.   - Continue to drink plenty of fluids.  Shower/Bathing  - You may shower as soon as you get home from the hospital unless otherwise instructed.  - Keep your incision out of water.  To keep the incision dry when showering, cover it with a plastic bag or plastic wrap.  - Pat incision dry if it gets wet.  Don’t rub.  - Do not submerge in a bath until staples are out and the incision is completely healed. (Approximately 6-8 weeks)  Dressing Change:  Procedure (if recommended by your physician)  - Wash hands.  - Open all new dressing change materials.  - Remove old dressing and discard.  - Inspect incision for redness, increase in clear drainage, yellow/green drainage, odor and surrounding skin hot to touch.  -  ABD (large gauze) pad or “island dressing” by one corner and lay over the incision.  Be careful not to touch the inside of the dressing that will lay over the incision.  - Secure in place as instructed (Ace wrap or tape).    Swelling/Bruising    - Swelling can last from 3-6 months.  - Elevate your leg higher than your heart while reclining.   The first week you are home you should elevate your leg an equal amount of time, as you are active.    - Anti-inflammatory pills can be taken once you have stopped the  blood thinners.  - The swelling is usually worse after you go home since you are upright for longer periods of time.  - Bruising is common and can involve the entire leg including the thigh, calf and even foot. Bruising often does not appear until after you arrive home and it can be quite dramatic- purple, black, and green.  The bruising you can see is not usually concerning and will subside without any treatment.      Blood Clot Prevention  Blood clots in the legs and the less common, but frightening, clots that travel to the lungs are a real focus of our preventative. Most patients are at standard risk for them, but those patients who are at higher risk include people who have had previous clots, a family history of clotting, smoking, diabetes, obesity, advanced age, use of estrogen and a sedentary lifestyle.    - Signs of blood clots in legs - Swelling in thigh, calf or ankle that does not go down with elevation.  Pain, heat and tenderness in calf, back of calf or groin area.  NOTE: blood clots can occur in either leg.  - You have been receiving anticoagulant therapy (blood thinners) in the hospital and you may be instructed to continue at home depending on your risk factors.  - Your risk for developing a clot continues for up to 2-3 months after surgery.  You should avoid prolonged sitting and dehydration during that time (long air trips and car trips).  If you do take a trip during this time, please get up and move around every 1- 1.5 hours.  - If you are prescribed blood-thinning medication for home, follow instructions as directed. (Handouts provided if applicable).      Activity  Once home, you should continue to stay active. The key is to remember not to overdo it! While you can expect some good days and some bad days, you should notice a gradual improvement and a gradual increase in your endurance over the next 6 to 12 months. Exercise is a critical component of home care, particularly during the first few  weeks after surgery.     - Normal activities of daily living You should be able to resume most within 3 to 6 weeks following surgery. Some pain with activity and at night is common for several weeks after surgery  -  Physical Therapy Exercises - Continue to do the exercises prescribed for at least two months after surgery. Riding a stationary bicycle can help maintain muscle tone and keep your knee flexible. Try to achieve the maximum degree of bending and extension possible. (handout provided by Therapist).  - Sexual Activity -. Your surgeon can tell you when it safe to resume sexual activity.    - Sleeping Positions - You can safely sleep on your back, on either side, or on your stomach.   - Other Activities - Walk as much as you like, but remember that walking is no substitute for the exercises your doctor and physical therapist will prescribe. Lower impact activities are preferred.  If you have specific questions, consult your Surgeon.    When to Call the Doctor   Call the physician if:   - Fever over 100.5? F  - Increased pain, drainage, redness, odor or heat around the incision area  - Shaking chills  - Increased knee pain with activity and rest  - Increased pain in calf, tenderness or redness above or below the knee  - Increased swelling of calf, ankle, foot  - Sudden increased shortness of breath, sudden onset of chest pain, localized chest pain with coughing  - Incision opening  Or, if there are any questions or concerns about medications or care.       -Is this patient being discharged with medication to prevent blood clots?  Yes, Aspirin Aspirin, ASA oral tablets  What is this medicine?  ASPIRIN (AS pir in) is a pain reliever. It is used to treat mild pain and fever. This medicine is also used as directed by a doctor to prevent and to treat heart attacks, to prevent strokes, and to treat arthritis or inflammation.  This medicine may be used for other purposes; ask your health care provider or pharmacist  if you have questions.  COMMON BRAND NAME(S): Aspir-Low, Aspir-Fransisca, Aspirtab, Tabby Advanced Aspirin, Tabby Aspirin, Tabby Aspirin Extra Strength, Tabby Aspirin Plus, Tabby Extra Strength, Tabby Extra Strength Plus, Tabby Genuine Aspirin, Tabby Womens Aspirin, Bufferin, Bufferin Extra Strength, Bufferin Low Dose  What should I tell my health care provider before I take this medicine?  They need to know if you have any of these conditions:  -anemia  -asthma  -bleeding problems  -child with chickenpox, the flu, or other viral infection  -diabetes  -gout  -if you frequently drink alcohol containing drinks  -kidney disease  -liver disease  -low level of vitamin K  -lupus  -smoke tobacco  -stomach ulcers or other problems  -an unusual or allergic reaction to aspirin, tartrazine dye, other medicines, dyes, or preservatives  -pregnant or trying to get pregnant  -breast-feeding  How should I use this medicine?  Take this medicine by mouth with a glass of water. Follow the directions on the package or prescription label. You can take this medicine with or without food. If it upsets your stomach, take it with food. Do not take your medicine more often than directed.  Talk to your pediatrician regarding the use of this medicine in children. While this drug may be prescribed for children as young as 12 years of age for selected conditions, precautions do apply. Children and teenagers should not use this medicine to treat chicken pox or flu symptoms unless directed by a doctor.  Patients over 65 years old may have a stronger reaction and need a smaller dose.  Overdosage: If you think you have taken too much of this medicine contact a poison control center or emergency room at once.  NOTE: This medicine is only for you. Do not share this medicine with others.  What if I miss a dose?  If you are taking this medicine on a regular schedule and miss a dose, take it as soon as you can. If it is almost time for your next dose, take  only that dose. Do not take double or extra doses.  What may interact with this medicine?  Do not take this medicine with any of the following medications:  -cidofovir  -ketorolac  -probenecid  This medicine may also interact with the following medications:  -alcohol  -alendronate  -bismuth subsalicylate  -flavocoxid  -herbal supplements like feverfew, garlic, albino, ginkgo biloba, horse chestnut  -medicines for diabetes or glaucoma like acetazolamide, methazolamide  -medicines for gout  -medicines that treat or prevent blood clots like enoxaparin, heparin, ticlopidine, warfarin  -other aspirin and aspirin-like medicines  -NSAIDs, medicines for pain and inflammation, like ibuprofen or naproxen  -pemetrexed  -sulfinpyrazone  -varicella live vaccine  This list may not describe all possible interactions. Give your health care provider a list of all the medicines, herbs, non-prescription drugs, or dietary supplements you use. Also tell them if you smoke, drink alcohol, or use illegal drugs. Some items may interact with your medicine.  What should I watch for while using this medicine?  If you are treating yourself for pain, tell your doctor or health care professional if the pain lasts more than 10 days, if it gets worse, or if there is a new or different kind of pain. Tell your doctor if you see redness or swelling. Also, check with your doctor if you have a fever that lasts for more than 3 days. Only take this medicine to prevent heart attacks or blood clotting if prescribed by your doctor or health care professional.  Do not take aspirin or aspirin-like medicines with this medicine. Too much aspirin can be dangerous. Always read the labels carefully.  This medicine can irritate your stomach or cause bleeding problems. Do not smoke cigarettes or drink alcohol while taking this medicine. Do not lie down for 30 minutes after taking this medicine to prevent irritation to your throat.  If you are scheduled for any  medical or dental procedure, tell your healthcare provider that you are taking this medicine. You may need to stop taking this medicine before the procedure.  This medicine may be used to treat migraines. If you take migraine medicines for 10 or more days a month, your migraines may get worse. Keep a diary of headache days and medicine use. Contact your healthcare professional if your migraine attacks occur more frequently.  What side effects may I notice from receiving this medicine?  Side effects that you should report to your doctor or health care professional as soon as possible:  -allergic reactions like skin rash, itching or hives, swelling of the face, lips, or tongue  -breathing problems  -changes in hearing, ringing in the ears  -confusion  -general ill feeling or flu-like symptoms  -pain on swallowing  -redness, blistering, peeling or loosening of the skin, including inside the mouth or nose  -signs and symptoms of bleeding such as bloody or black, tarry stools; red or dark-brown urine; spitting up blood or brown material that looks like coffee grounds; red spots on the skin; unusual bruising or bleeding from the eye, gums, or nose  -trouble passing urine or change in the amount of urine  -unusually weak or tired  -yellowing of the eyes or skin  Side effects that usually do not require medical attention (report to your doctor or health care professional if they continue or are bothersome):  -diarrhea or constipation  -headache  -nausea, vomiting  -stomach gas, heartburn  This list may not describe all possible side effects. Call your doctor for medical advice about side effects. You may report side effects to FDA at 1-426-FDA-9137.  Where should I keep my medicine?  Keep out of the reach of children.  Store at room temperature between 15 and 30 degrees C (59 and 86 degrees F). Protect from heat and moisture. Do not use this medicine if it has a strong vinegar smell. Throw away any unused medicine after the  expiration date.  NOTE: This sheet is a summary. It may not cover all possible information. If you have questions about this medicine, talk to your doctor, pharmacist, or health care provider.  © 2018 [employer]/Gold Standard (2014-08-19 11:30:31)      · Is patient discharged on Warfarin / Coumadin?   No     Depression / Suicide Risk    As you are discharged from this [facility] facility, it is important to learn how to keep safe from harming yourself.    Recognize the warning signs:  · Abrupt changes in personality, positive or negative- including increase in energy   · Giving away possessions  · Change in eating patterns- significant weight changes-  positive or negative  · Change in sleeping patterns- unable to sleep or sleeping all the time   · Unwillingness or inability to communicate  · Depression  · Unusual sadness, discouragement and loneliness  · Talk of wanting to die  · Neglect of personal appearance   · Rebelliousness- reckless behavior  · Withdrawal from people/activities they love  · Confusion- inability to concentrate     If you or a loved one observes any of these behaviors or has concerns about self-harm, here's what you can do:  · Talk about it- your feelings and reasons for harming yourself  · Remove any means that you might use to hurt yourself (examples: pills, rope, extension cords, firearm)  · Get professional help from the community (Mental Health, Substance Abuse, psychological counseling)  · Do not be alone:Call your Safe Contact- someone whom you trust who will be there for you.  · Call your local CRISIS HOTLINE 063-1316 or 999-686-7180  · Call your local Children's Mobile Crisis Response Team [place] (105) 247-1682 or www.Lopoly  · Call the toll free National Suicide Prevention Hotlines   · National Suicide Prevention Lifeline 366-884-XEXC (3272)  · National Hope Line Network 800-SUICIDE (099-4550)

## 2018-06-28 NOTE — THERAPY
"Physical Therapy Evaluation completed.   Bed Mobility:  Supine to Sit: Supervised  Transfers: Sit to Stand: Stand by Assist  Gait: Level Of Assist: Stand by Assist with Front-Wheel Walker       Plan of Care: Patient with no further skilled PT needs in the acute care setting at this time  Discharge Recommendations: Equipment: No Equipment Needed. Post-acute therapy Discharge to home with outpatient or home health for additional skilled therapy services.    See \"Rehab Therapy-Acute\" Patient Summary Report for complete documentation.   Pt is a 67 y.o.f. referred to PT s/p right TKA.  Pt is alert and able to follow commands.  Pt educated in ice and elevation.  Pt educated in HEP and handout provided.   present for training.  Pt educated in proper gait pattern.  Tactile and verbal cues uses as needed to promote wt shift right and equal stance time.  Pt educated on stairs with single rail.   present for all training.  No further skilled PT needs in acute care setting.  "

## 2018-06-28 NOTE — PROGRESS NOTES
Report received from Day RN. Pt awake and alert. Pt states she needs to urinate. Pt states she feels light headed. Pt able to pivot to commode. Pt transferred safely back to bed. Pt states no further needs at this time.

## 2018-06-28 NOTE — THERAPY
"Occupational Therapy Evaluation completed.   Functional Status:  Supervised supine to sit, able to don underwear with SBA without tools.  Pt prefers not to dress completely at this time as she is worried about numbness in distal RLE and a partial foot drop on the R overnight.  She is not convinced she will be discharged home today.  Pt able to dorsiflex R ankle appropriately during LB dressing and able to walk to sink with FWW and SBA with no evidence of dragging R foot.  Pt returned back to supine with supervision.  At this time, pt appears safe to d/c home from OT perspective, will await PT eval to concur.   Educated pt and spouse on role of OT and Total Knee Replacement Protocol.  Reviewed application of precautions and use of Adaptive Equipment for dressing, bathing, toileting, grooming, kitchen activities and general functional mobility in the home. Reviewed the use of reacher, long handled shoe horn and long handled sponge, as well as shower chair and raised toilet seat to assist with ADL's.  Reviewed stall shower transfers.  PT has all needed items from spouse's STEFAN previously. Educated on covering incision/dressing with extra plastic wrap and waterproof tape to ensure that incision does not get wet.  Educated on around the clock pain management strategies so that pt does not wake up in a pain crisis.  Pt verbalized understanding of all education provided.    Plan of Care: Patient with no further skilled OT needs in the acute care setting at this time  Discharge Recommendations:  Equipment: No Equipment Needed. Post-acute therapy Discharge to home with outpatient or home health for additional skilled therapy services.    See \"Rehab Therapy-Acute\" Patient Summary Report for complete documentation.    "

## 2022-01-21 ENCOUNTER — DOCUMENTATION (OUTPATIENT)
Dept: NEUROSURGERY | Facility: MEDICAL CENTER | Age: 72
End: 2022-01-21

## 2022-01-21 DIAGNOSIS — M50.30 DDD (DEGENERATIVE DISC DISEASE), CERVICAL: ICD-10-CM

## 2022-01-21 DIAGNOSIS — M54.2 NECK PAIN: ICD-10-CM

## 2022-01-21 NOTE — PROGRESS NOTES
Edilma called with worsening neck pain, stiffness and headaches.  She has been assessed in the past for cervical spine issues.  She is denying any radicular type symptoms.  We discussed conservative treatment options today over the phone and she is willing to try physical therapy.  I will order physical therapy and we will email her this order.  If she has persisting symptoms she will let us know and we would follow-up as needed moving forward.

## 2022-06-29 ENCOUNTER — OFFICE VISIT (OUTPATIENT)
Dept: CARDIOLOGY | Facility: MEDICAL CENTER | Age: 72
End: 2022-06-29
Payer: MEDICARE

## 2022-06-29 VITALS
DIASTOLIC BLOOD PRESSURE: 76 MMHG | HEIGHT: 63 IN | OXYGEN SATURATION: 98 % | WEIGHT: 147 LBS | HEART RATE: 54 BPM | RESPIRATION RATE: 14 BRPM | BODY MASS INDEX: 26.05 KG/M2 | SYSTOLIC BLOOD PRESSURE: 112 MMHG

## 2022-06-29 DIAGNOSIS — Z79.01 ANTICOAGULATED: ICD-10-CM

## 2022-06-29 DIAGNOSIS — I48.19 OTHER PERSISTENT ATRIAL FIBRILLATION (HCC): ICD-10-CM

## 2022-06-29 DIAGNOSIS — I48.0 PAF (PAROXYSMAL ATRIAL FIBRILLATION) (HCC): ICD-10-CM

## 2022-06-29 LAB — EKG IMPRESSION: NORMAL

## 2022-06-29 PROCEDURE — 99204 OFFICE O/P NEW MOD 45 MIN: CPT | Mod: 25 | Performed by: INTERNAL MEDICINE

## 2022-06-29 PROCEDURE — 93000 ELECTROCARDIOGRAM COMPLETE: CPT | Performed by: INTERNAL MEDICINE

## 2022-06-29 RX ORDER — MONTELUKAST SODIUM 10 MG/1
TABLET ORAL
COMMUNITY
Start: 2022-06-07 | End: 2024-02-13

## 2022-06-29 RX ORDER — METOPROLOL SUCCINATE 25 MG/1
12.5 TABLET, EXTENDED RELEASE ORAL EVERY EVENING
COMMUNITY
Start: 2020-06-02 | End: 2023-10-25

## 2022-06-29 RX ORDER — LEVOTHYROXINE SODIUM 0.05 MG/1
TABLET ORAL
COMMUNITY
Start: 2022-05-26

## 2022-06-29 ASSESSMENT — ENCOUNTER SYMPTOMS
NERVOUS/ANXIOUS: 0
EYE PAIN: 0
HEMOPTYSIS: 0
MYALGIAS: 0
PALPITATIONS: 1
EYE DISCHARGE: 0
FEVER: 0
WHEEZING: 0
DEPRESSION: 0
VOMITING: 0
NAUSEA: 0
CHILLS: 0
BLURRED VISION: 0
SPEECH CHANGE: 0
COUGH: 0
LOSS OF CONSCIOUSNESS: 0
BRUISES/BLEEDS EASILY: 0
ABDOMINAL PAIN: 0

## 2022-06-29 NOTE — PROGRESS NOTES
"Chief Complaint   Patient presents with   • New Patient     N/P Dx: Other persistent atrial fibrillation       Subjective     Edilma Taylor is a 71 y.o. female who presents today being seen as new patient for second opinion regarding paroxysmal atrial fibrillation.  2-year history of PAF.  Last episode of this month, first episode June 2, 2020.  Episodes up to 8 hours.  Normal echocardiogram and normal cardiac catheterization.  On low-dose beta-blockers.  Saw electrophysiologist at [place] and he wanted to start flecainide 50 twice daily.  Patient somewhat hesitant to use this medication.  Worked at [employer] for many years.  Now retired.  On thyroid replacement euthyroid.  No history of sleep apnea.  Minimal caffeine minimal alcohol.  Family history of CVA in her mother.  She also had atrial fibrillation.  has children and grandchildren.  Retired.      Past Medical History:   Diagnosis Date   • Anesthesia     WAKES UP SLOWLY     hypotensive episode 2015   • Arthritis     osteoarthritis in joints \"knees especially\"   • Cancer (HCC) 1999    skin basal and squamous   • Cataract     IOL bilateral   • Pain     knee pain   • Sensitive to concentration of IV drug     and oral drugs   • Thyroid activity decreased     takes suppliment   • Transfusion history 1974    hemorrage after D&C   • Unspecified hemorrhagic conditions 1980s    from taking too much aspirin     Past Surgical History:   Procedure Laterality Date   • KNEE ARTHROPLASTY TOTAL Right 6/27/2018    Procedure: KNEE ARTHROPLASTY TOTAL;  Surgeon: Sindi Ochoa M.D.;  Location: SURGERY [facility];  Service: Orthopedics   • KNEE ARTHROSCOPY Right 12/28/2017    x 2   • KNEE ARTHROSCOPY Right 12/28/2017    Procedure: RT KNEE ARTHROSCOPY, Chondroplasty, Partial Meniscectomy;  Surgeon: Jaswant Velez M.D.;  Location: SURGERY [facility];  Service: Orthopedics   • CATARACT EXTRACTION WITH IOL Bilateral 2016   • PTOSIS REPAIR  " 3/31/2015    Performed by Alfredo Marcus M.D. at SUR[facility]ERY [facility]   • HYSTERECTOMY, TOTAL ABDOMINAL  1992   • APPENDECTOMY  1992   • CARPAL TUNNEL RELEASE Bilateral 1991   • BUNIONECTOMY Bilateral 1988   • DILATION AND CURETTAGE       History reviewed. No pertinent family history.  Social History     Socioeconomic History   • Marital status:      Spouse name: Not on file   • Number of children: Not on file   • Years of education: Not on file   • Highest education level: Not on file   Occupational History   • Not on file   Tobacco Use   • Smoking status: Never Smoker   • Smokeless tobacco: Never Used   Substance and Sexual Activity   • Alcohol use: Yes     Comment: socially   • Drug use: No   • Sexual activity: Not on file   Other Topics Concern   • Not on file   Social History Narrative   • Not on file     Social Determinants of Health     Financial Resource Strain: Not on file   Food Insecurity: Not on file   Transportation Needs: Not on file   Physical Activity: Not on file   Stress: Not on file   Social Connections: Not on file   Intimate Partner Violence: Not on file   Housing Stability: Not on file     Allergies   Allergen Reactions   • Penicillins Hives and Rash     HIVES    HIVES   • Morphine Nausea and Vomiting     EXTREME VOMITING  Other reaction(s): GI Upset  EXTREME VOMITING   • Sulfa Drugs      EXTREME MUCOUS PRODUCTION  Other reaction(s): Rhinitis  EXTREME MUCOUS PRODUCTION   • Gabapentin Palpitations   • Kenalog      INJECTION  CAUSES VERTIGO / THRUSH   • Triamcinolone Nausea and Palpitations     Other reaction(s): Dizziness, Flushing  INJECTION  CAUSES VERTIGO / THRUSH- kenolog     Outpatient Encounter Medications as of 6/29/2022   Medication Sig Dispense Refill   • levothyroxine (SYNTHROID) 50 MCG Tab 1 TABLET IN THE MORNING ON AN EMPTY STOMACH ORALLY ONCE A DAY     • metoprolol SR (TOPROL XL) 25 MG TABLET SR 24 HR Take 25 mg by mouth.     • ESTRADIOL PO      • montelukast (SINGULAIR)  10 MG Tab TAKE 1 TABLET (10 MG) BY MOUTH NIGHTLY AT 9 PM     • Bevacizumab (AVASTIN IV) Infuse  into a venous catheter.     • acetaminophen (TYLENOL) 500 MG Tab Take 1.5 Tabs by mouth every 6 hours. 30 Tab 0   • Nutritional Supplements (JOINT FORMULA PO) Take 1 Tab by mouth every day.     • ascorbic acid (ASCORBIC ACID) 500 MG TABS Take 1,000 mg by mouth every day.     • therapeutic multivitamin-minerals (THERAGRAN-M) TABS Take 1 Tab by mouth every day.     • Non Formulary Request Take 1 Cap by mouth every day. EYE VITAMIN     • Probiotic Product (PROBIOTIC DAILY PO) Take 1 Cap by mouth every day.     • Non Formulary Request Take 1 Cap by mouth every day. VITAMIN D + CALCIUM     • ondansetron (ZOFRAN ODT) 4 MG TABLET DISPERSIBLE Take 1 Tab by mouth every four hours as needed for Nausea. 10 Tab 0   • docusate sodium 100 MG Cap Take 100 mg by mouth 2 times a day. 60 Cap 0   • [DISCONTINUED] aspirin EC 81 MG EC tablet Take 1 Tab by mouth 2 times a day. 60 Tab 0   • meloxicam (MOBIC) 7.5 MG Tab Take 7.5 mg by mouth as needed.     • Bioflavonoid Products (GRAPE SEED) 250-50 MG Cap Take 1 Tab by mouth every day.     • [DISCONTINUED] Multiple Vitamins-Minerals (HAIR SKIN AND NAILS FORMULA PO) Take 3 Tabs by mouth every day.     • [DISCONTINUED] thyroid (NATURE-THROID) 65 MG tablet Take 65 mg by mouth every day.     • [DISCONTINUED] loratadine (CLARITIN) 10 MG TABS Take 10 mg by mouth as needed.       No facility-administered encounter medications on file as of 6/29/2022.     Review of Systems   Constitutional: Negative for chills and fever.   HENT: Negative for congestion.    Eyes: Negative for blurred vision, pain and discharge.   Respiratory: Negative for cough, hemoptysis and wheezing.    Cardiovascular: Positive for palpitations. Negative for chest pain.   Gastrointestinal: Negative for abdominal pain, nausea and vomiting.   Musculoskeletal: Negative for joint pain and myalgias.   Skin: Negative for itching and rash.  "  Neurological: Negative for speech change and loss of consciousness.   Endo/Heme/Allergies: Does not bruise/bleed easily.   Psychiatric/Behavioral: Negative for depression. The patient is not nervous/anxious.    All other systems reviewed and are negative.             Objective     /76 (BP Location: Left arm, Patient Position: Sitting, BP Cuff Size: Adult)   Pulse (!) 54   Resp 14   Ht 1.6 m (5' 3\")   Wt 66.7 kg (147 lb)   SpO2 98%   BMI 26.04 kg/m²     Physical Exam  Constitutional:       Appearance: She is well-developed.   HENT:      Head: Normocephalic and atraumatic.   Eyes:      Pupils: Pupils are equal, round, and reactive to light.   Cardiovascular:      Rate and Rhythm: Normal rate and regular rhythm.      Heart sounds: Normal heart sounds. No murmur heard.    No friction rub. No gallop.   Pulmonary:      Effort: Pulmonary effort is normal.      Breath sounds: Normal breath sounds.   Abdominal:      General: Bowel sounds are normal.      Palpations: Abdomen is soft.   Musculoskeletal:         General: Normal range of motion.      Cervical back: Normal range of motion and neck supple.   Skin:     General: Skin is warm.   Neurological:      Mental Status: She is alert and oriented to person, place, and time.      Cranial Nerves: No cranial nerve deficit.   Psychiatric:         Behavior: Behavior normal.         Thought Content: Thought content normal.         Judgment: Judgment normal.                Assessment & Plan     1. Other persistent atrial fibrillation (HCC)  EKG   2. PAF (paroxysmal atrial fibrillation) (HCC)     3. Anticoagulated         Medical Decision Making: Today's Assessment/Status/Plan:   1.  Paroxysmal atrial fibrillation.  Evening episodes probable vagal.  Discussed starting flecainide 50 twice daily continue low-dose metoprolol.  She will consider.  Mild bradycardia at baseline.  2.  Also briefly discussed catheter ablation.  3.  Anticoagulation on NOAC.  4.  Follow-up in 3 " months.

## 2022-08-10 ENCOUNTER — APPOINTMENT (RX ONLY)
Dept: URBAN - METROPOLITAN AREA CLINIC 31 | Facility: CLINIC | Age: 72
Setting detail: DERMATOLOGY
End: 2022-08-10

## 2022-08-10 DIAGNOSIS — D485 NEOPLASM OF UNCERTAIN BEHAVIOR OF SKIN: ICD-10-CM

## 2022-08-10 DIAGNOSIS — L82.0 INFLAMED SEBORRHEIC KERATOSIS: ICD-10-CM

## 2022-08-10 DIAGNOSIS — Z85.828 PERSONAL HISTORY OF OTHER MALIGNANT NEOPLASM OF SKIN: ICD-10-CM

## 2022-08-10 DIAGNOSIS — L21.8 OTHER SEBORRHEIC DERMATITIS: ICD-10-CM | Status: INADEQUATELY CONTROLLED

## 2022-08-10 PROBLEM — D48.5 NEOPLASM OF UNCERTAIN BEHAVIOR OF SKIN: Status: ACTIVE | Noted: 2022-08-10

## 2022-08-10 PROCEDURE — ? MEDICATION COUNSELING

## 2022-08-10 PROCEDURE — 99204 OFFICE O/P NEW MOD 45 MIN: CPT | Mod: 25

## 2022-08-10 PROCEDURE — ? COUNSELING

## 2022-08-10 PROCEDURE — ? BIOPSY BY SHAVE METHOD

## 2022-08-10 PROCEDURE — 11102 TANGNTL BX SKIN SINGLE LES: CPT

## 2022-08-10 PROCEDURE — ? PRESCRIPTION

## 2022-08-10 RX ORDER — FLUOCINONIDE 0.5 MG/ML
SOLUTION TOPICAL BID
Qty: 60 | Refills: 1 | Status: ERX | COMMUNITY
Start: 2022-08-10

## 2022-08-10 RX ADMIN — FLUOCINONIDE: 0.5 SOLUTION TOPICAL at 00:00

## 2022-08-10 ASSESSMENT — LOCATION SIMPLE DESCRIPTION DERM
LOCATION SIMPLE: LEFT THUMB
LOCATION SIMPLE: LEFT FOREHEAD
LOCATION SIMPLE: LEFT UPPER BACK
LOCATION SIMPLE: RIGHT SCALP
LOCATION SIMPLE: CHEST
LOCATION SIMPLE: LEFT UPPER ARM

## 2022-08-10 ASSESSMENT — LOCATION ZONE DERM
LOCATION ZONE: FACE
LOCATION ZONE: FINGER
LOCATION ZONE: ARM
LOCATION ZONE: SCALP
LOCATION ZONE: TRUNK

## 2022-08-10 ASSESSMENT — LOCATION DETAILED DESCRIPTION DERM
LOCATION DETAILED: LEFT MEDIAL UPPER BACK
LOCATION DETAILED: RIGHT MEDIAL FRONTAL SCALP
LOCATION DETAILED: LEFT ANTERIOR DISTAL UPPER ARM
LOCATION DETAILED: LEFT INFERIOR FOREHEAD
LOCATION DETAILED: MIDDLE STERNUM
LOCATION DETAILED: DORSAL INTERPHALANGEAL JOINT LEFT THUMB

## 2022-08-10 NOTE — PROCEDURE: MIPS QUALITY
Quality 130: Documentation Of Current Medications In The Medical Record: Current Medications Documented
Detail Level: Detailed
Quality 226: Preventive Care And Screening: Tobacco Use: Screening And Cessation Intervention: Patient screened for tobacco use and is an ex/non-smoker
Quality 111:Pneumonia Vaccination Status For Older Adults: Pneumococcal vaccine administered on or after patient’s 60th birthday and before the end of the measurement period
Quality 431: Preventive Care And Screening: Unhealthy Alcohol Use - Screening: Patient not identified as an unhealthy alcohol user when screened for unhealthy alcohol use using a systematic screening method
Quality 110: Preventive Care And Screening: Influenza Immunization: Influenza Immunization previously received during influenza season

## 2022-08-10 NOTE — PROCEDURE: MEDICATION COUNSELING
Ivermectin Counseling:  Patient instructed to take medication on an empty stomach with a full glass of water.  Patient informed of potential adverse effects including but not limited to nausea, diarrhea, dizziness, itching, and swelling of the extremities or lymph nodes.  The patient verbalized understanding of the proper use and possible adverse effects of ivermectin.  All of the patient's questions and concerns were addressed.
Rituxan Counseling:  I discussed with the patient the risks of Rituxan infusions. Side effects can include infusion reactions, severe drug rashes including mucocutaneous reactions, reactivation of latent hepatitis and other infections and rarely progressive multifocal leukoencephalopathy.  All of the patient's questions and concerns were addressed.
Qbrexza Pregnancy And Lactation Text: There is no available data on Qbrexza use in pregnant women.  There is no available data on Qbrexza use in lactation.
Calcipotriene Pregnancy And Lactation Text: This medication has not been proven safe during pregnancy. It is unknown if this medication is excreted in breast milk.
Include Pregnancy/Lactation Warning?: No
Humira Pregnancy And Lactation Text: This medication is Pregnancy Category B and is considered safe during pregnancy. It is unknown if this medication is excreted in breast milk.
Erivedge Pregnancy And Lactation Text: This medication is Pregnancy Category X and is absolutely contraindicated during pregnancy. It is unknown if it is excreted in breast milk.
Spironolactone Pregnancy And Lactation Text: This medication can cause feminization of the male fetus and should be avoided during pregnancy. The active metabolite is also found in breast milk.
Zyclara Counseling:  I discussed with the patient the risks of imiquimod including but not limited to erythema, scaling, itching, weeping, crusting, and pain.  Patient understands that the inflammatory response to imiquimod is variable from person to person and was educated regarded proper titration schedule.  If flu-like symptoms develop, patient knows to discontinue the medication and contact us.
Mirvaso Counseling: Mirvaso is a topical medication which can decrease superficial blood flow where applied. Side effects are uncommon and include stinging, redness and allergic reactions.
Quinolones Counseling:  I discussed with the patient the risks of fluoroquinolones including but not limited to GI upset, allergic reaction, drug rash, diarrhea, dizziness, photosensitivity, yeast infections, liver function test abnormalities, tendonitis/tendon rupture.
Prednisone Counseling:  I discussed with the patient the risks of prolonged use of prednisone including but not limited to weight gain, insomnia, osteoporosis, mood changes, diabetes, susceptibility to infection, glaucoma and high blood pressure.  In cases where prednisone use is prolonged, patients should be monitored with blood pressure checks, serum glucose levels and an eye exam.  Additionally, the patient may need to be placed on GI prophylaxis, PCP prophylaxis, and calcium and vitamin D supplementation and/or a bisphosphonate.  The patient verbalized understanding of the proper use and the possible adverse effects of prednisone.  All of the patient's questions and concerns were addressed.
Fluconazole Pregnancy And Lactation Text: This medication is Pregnancy Category C and it isn't know if it is safe during pregnancy. It is also excreted in breast milk.
Clindamycin Pregnancy And Lactation Text: This medication can be used in pregnancy if certain situations. Clindamycin is also present in breast milk.
High Dose Vitamin A Pregnancy And Lactation Text: High dose vitamin A therapy is contraindicated during pregnancy and breast feeding.
Cimzia Counseling:  I discussed with the patient the risks of Cimzia including but not limited to immunosuppression, allergic reactions and infections.  The patient understands that monitoring is required including a PPD at baseline and must alert us or the primary physician if symptoms of infection or other concerning signs are noted.
Otezla Counseling: The side effects of Otezla were discussed with the patient, including but not limited to worsening or new depression, weight loss, diarrhea, nausea, upper respiratory tract infection, and headache. Patient instructed to call the office should any adverse effect occur.  The patient verbalized understanding of the proper use and possible adverse effects of Otezla.  All the patient's questions and concerns were addressed.
Taltz Counseling: I discussed with the patient the risks of ixekizumab including but not limited to immunosuppression, serious infections, worsening of inflammatory bowel disease and drug reactions.  The patient understands that monitoring is required including a PPD at baseline and must alert us or the primary physician if symptoms of infection or other concerning signs are noted.
Topical Clindamycin Pregnancy And Lactation Text: This medication is Pregnancy Category B and is considered safe during pregnancy. It is unknown if it is excreted in breast milk.
Cimetidine Counseling:  I discussed with the patient the risks of Cimetidine including but not limited to gynecomastia, headache, diarrhea, nausea, drowsiness, arrhythmias, pancreatitis, skin rashes, psychosis, bone marrow suppression and kidney toxicity.
Eucrisa Pregnancy And Lactation Text: This medication has not been assigned a Pregnancy Risk Category but animal studies failed to show danger with the topical medication. It is unknown if the medication is excreted in breast milk.
Ivermectin Pregnancy And Lactation Text: This medication is Pregnancy Category C and it isn't known if it is safe during pregnancy. It is also excreted in breast milk.
Libtayo Pregnancy And Lactation Text: This medication is contraindicated in pregnancy and when breast feeding.
Cantharidin Counseling: Calcipotriene Counseling:  I discussed with the patient the risks of calcipotriene including but not limited to erythema, scaling, itching, and irritation.
Azathioprine Pregnancy And Lactation Text: This medication is Pregnancy Category D and isn't considered safe during pregnancy. It is unknown if this medication is excreted in breast milk.
Rituxan Pregnancy And Lactation Text: This medication is Pregnancy Category C and it isn't know if it is safe during pregnancy. It is unknown if this medication is excreted in breast milk but similar antibodies are known to be excreted.
Ilumya Counseling: I discussed with the patient the risks of tildrakizumab including but not limited to immunosuppression, malignancy, posterior leukoencephalopathy syndrome, and serious infections.  The patient understands that monitoring is required including a PPD at baseline and must alert us or the primary physician if symptoms of infection or other concerning signs are noted.
Rhofade Counseling: Rhofade is a topical medication which can decrease superficial blood flow where applied. Side effects are uncommon and include stinging, redness and allergic reactions.
Finasteride Male Counseling: Finasteride Counseling:  I discussed with the patient the risks of use of finasteride including but not limited to decreased libido, decreased ejaculate volume, gynecomastia, and depression. Women should not handle medication.  All of the patient's questions and concerns were addressed.
SSKI Counseling:  I discussed with the patient the risks of SSKI including but not limited to thyroid abnormalities, metallic taste, GI upset, fever, headache, acne, arthralgias, paraesthesias, lymphadenopathy, easy bleeding, arrhythmias, and allergic reaction.
Zyclara Pregnancy And Lactation Text: This medication is Pregnancy Category C. It is unknown if this medication is excreted in breast milk.
Mirvaso Pregnancy And Lactation Text: This medication has not been assigned a Pregnancy Risk Category. It is unknown if the medication is excreted in breast milk.
Griseofulvin Counseling:  I discussed with the patient the risks of griseofulvin including but not limited to photosensitivity, cytopenia, liver damage, nausea/vomiting and severe allergy.  The patient understands that this medication is best absorbed when taken with a fatty meal (e.g., ice cream or french fries).
Otezla Pregnancy And Lactation Text: This medication is Pregnancy Category C and it isn't known if it is safe during pregnancy. It is unknown if it is excreted in breast milk.
Prednisone Pregnancy And Lactation Text: This medication is Pregnancy Category C and it isn't know if it is safe during pregnancy. This medication is excreted in breast milk.
Cimzia Pregnancy And Lactation Text: This medication crosses the placenta but can be considered safe in certain situations. Cimzia may be excreted in breast milk.
Taltz Pregnancy And Lactation Text: The risk during pregnancy and breastfeeding is uncertain with this medication.
Topical Ketoconazole Counseling: Patient counseled that this medication may cause skin irritation or allergic reactions.  In the event of skin irritation, the patient was advised to reduce the amount of the drug applied or use it less frequently.   The patient verbalized understanding of the proper use and possible adverse effects of ketoconazole.  All of the patient's questions and concerns were addressed.
Hydroquinone Counseling:  Patient advised that medication may result in skin irritation, lightening (hypopigmentation), dryness, and burning.  In the event of skin irritation, the patient was advised to reduce the amount of the drug applied or use it less frequently.  Rarely, spots that are treated with hydroquinone can become darker (pseudoochronosis).  Should this occur, patient instructed to stop medication and call the office. The patient verbalized understanding of the proper use and possible adverse effects of hydroquinone.  All of the patient's questions and concerns were addressed.
Cimetidine Pregnancy And Lactation Text: This medication is Pregnancy Category B and is considered safe during pregnancy. It is also excreted in breast milk and breast feeding isn't recommended.
Doxycycline Counseling:  Patient counseled regarding possible photosensitivity and increased risk for sunburn.  Patient instructed to avoid sunlight, if possible.  When exposed to sunlight, patients should wear protective clothing, sunglasses, and sunscreen.  The patient was instructed to call the office immediately if the following severe adverse effects occur:  hearing changes, easy bruising/bleeding, severe headache, or vision changes.  The patient verbalized understanding of the proper use and possible adverse effects of doxycycline.  All of the patient's questions and concerns were addressed.
Niacinamide Counseling: I recommended taking niacin or niacinamide, also know as vitamin B3, twice daily. Recent evidence suggests that taking vitamin B3 (500 mg twice daily) can reduce the risk of actinic keratoses and non-melanoma skin cancers. Side effects of vitamin B3 include flushing and headache.
Siliq Counseling:  I discussed with the patient the risks of Siliq including but not limited to new or worsening depression, suicidal thoughts and behavior, immunosuppression, malignancy, posterior leukoencephalopathy syndrome, and serious infections.  The patient understands that monitoring is required including a PPD at baseline and must alert us or the primary physician if symptoms of infection or other concerning signs are noted. There is also a special program designed to monitor depression which is required with Siliq.
Cantharidin Pregnancy And Lactation Text: The use of this medication during pregnancy or lactation is not recommended as there is insufficient data.
Cellcept Counseling:  I discussed with the patient the risks of mycophenolate mofetil including but not limited to infection/immunosuppression, GI upset, hypokalemia, hypercholesterolemia, bone marrow suppression, lymphoproliferative disorders, malignancy, GI ulceration/bleed/perforation, colitis, interstitial lung disease, kidney failure, progressive multifocal leukoencephalopathy, and birth defects.  The patient understands that monitoring is required including a baseline creatinine and regular CBC testing. In addition, patient must alert us immediately if symptoms of infection or other concerning signs are noted.
Sski Pregnancy And Lactation Text: This medication is Pregnancy Category D and isn't considered safe during pregnancy. It is excreted in breast milk.
Finasteride Pregnancy And Lactation Text: This medication is absolutely contraindicated during pregnancy. It is unknown if it is excreted in breast milk.
Opzelura Counseling:  I discussed with the patient the risks of Opzelura including but not limited to nasopharngitis, bronchitis, ear infection, eosinophila, hives, diarrhea, folliculitis, tonsillitis, and rhinorrhea.  Taken orally, this medication has been linked to serious infections; higher rate of mortality; malignancy and lymphoproliferative disorders; major adverse cardiovascular events; thrombosis; thrombocytopenia, anemia, and neutropenia; and lipid elevations.
Rifampin Counseling: I discussed with the patient the risks of rifampin including but not limited to liver damage, kidney damage, red-orange body fluids, nausea/vomiting and severe allergy.
Azelaic Acid Counseling: Patient counseled that medicine may cause skin irritation and to avoid applying near the eyes.  In the event of skin irritation, the patient was advised to reduce the amount of the drug applied or use it less frequently.   The patient verbalized understanding of the proper use and possible adverse effects of azelaic acid.  All of the patient's questions and concerns were addressed.
Oxybutynin Counseling:  I discussed with the patient the risks of oxybutynin including but not limited to skin rash, drowsiness, dry mouth, difficulty urinating, and blurred vision.
Tremfya Counseling: I discussed with the patient the risks of guselkumab including but not limited to immunosuppression, serious infections, and drug reactions.  The patient understands that monitoring is required including a PPD at baseline and must alert us or the primary physician if symptoms of infection or other concerning signs are noted.
Cosentyx Counseling:  I discussed with the patient the risks of Cosentyx including but not limited to worsening of Crohn's disease, immunosuppression, allergic reactions and infections.  The patient understands that monitoring is required including a PPD at baseline and must alert us or the primary physician if symptoms of infection or other concerning signs are noted.
Colchicine Counseling:  Patient counseled regarding adverse effects including but not limited to stomach upset (nausea, vomiting, stomach pain, or diarrhea).  Patient instructed to limit alcohol consumption while taking this medication.  Colchicine may reduce blood counts especially with prolonged use.  The patient understands that monitoring of kidney function and blood counts may be required, especially at baseline. The patient verbalized understanding of the proper use and possible adverse effects of colchicine.  All of the patient's questions and concerns were addressed.
Aklief counseling:  Patient advised to apply a pea-sized amount only at bedtime and wait 30 minutes after washing their face before applying.  If too drying, patient may add a non-comedogenic moisturizer.  The most commonly reported side effects including irritation, redness, scaling, dryness, stinging, burning, itching, and increased risk of sunburn.  The patient verbalized understanding of the proper use and possible adverse effects of retinoids.  All of the patient's questions and concerns were addressed.
Griseofulvin Pregnancy And Lactation Text: This medication is Pregnancy Category X and is known to cause serious birth defects. It is unknown if this medication is excreted in breast milk but breast feeding should be avoided.
Doxycycline Pregnancy And Lactation Text: This medication is Pregnancy Category D and not consider safe during pregnancy. It is also excreted in breast milk but is considered safe for shorter treatment courses.
Doxepin Counseling:  Patient advised that the medication is sedating and not to drive a car after taking this medication. Patient informed of potential adverse effects including but not limited to dry mouth, urinary retention, and blurry vision.  The patient verbalized understanding of the proper use and possible adverse effects of doxepin.  All of the patient's questions and concerns were addressed.
Azithromycin Counseling:  I discussed with the patient the risks of azithromycin including but not limited to GI upset, allergic reaction, drug rash, diarrhea, and yeast infections.
Acitretin Counseling:  I discussed with the patient the risks of acitretin including but not limited to hair loss, dry lips/skin/eyes, liver damage, hyperlipidemia, depression/suicidal ideation, photosensitivity.  Serious rare side effects can include but are not limited to pancreatitis, pseudotumor cerebri, bony changes, clot formation/stroke/heart attack.  Patient understands that alcohol is contraindicated since it can result in liver toxicity and significantly prolong the elimination of the drug by many years.
Opioid Counseling: I discussed with the patient the potential side effects of opioids including but not limited to addiction, altered mental status, and depression. I stressed avoiding alcohol, benzodiazepines, muscle relaxants and sleep aids unless specifically okayed by a physician. The patient verbalized understanding of the proper use and possible adverse effects of opioids. All of the patient's questions and concerns were addressed. They were instructed to flush the remaining pills down the toilet if they did not need them for pain.
Niacinamide Pregnancy And Lactation Text: These medications are considered safe during pregnancy.
Solaraze Counseling:  I discussed with the patient the risks of Solaraze including but not limited to erythema, scaling, itching, weeping, crusting, and pain.
5-Fu Counseling: 5-Fluorouracil Counseling:  I discussed with the patient the risks of 5-fluorouracil including but not limited to erythema, scaling, itching, weeping, crusting, and pain.
Thalidomide Counseling: I discussed with the patient the risks of thalidomide including but not limited to birth defects, anxiety, weakness, chest pain, dizziness, cough and severe allergy.
Gabapentin Counseling: I discussed with the patient the risks of gabapentin including but not limited to dizziness, somnolence, fatigue and ataxia.
Rifampin Pregnancy And Lactation Text: This medication is Pregnancy Category C and it isn't know if it is safe during pregnancy. It is also excreted in breast milk and should not be used if you are breast feeding.
Azelaic Acid Pregnancy And Lactation Text: This medication is considered safe during pregnancy and breast feeding.
Infliximab Counseling:  I discussed with the patient the risks of infliximab including but not limited to myelosuppression, immunosuppression, autoimmune hepatitis, demyelinating diseases, lymphoma, and serious infections.  The patient understands that monitoring is required including a PPD at baseline and must alert us or the primary physician if symptoms of infection or other concerning signs are noted.
Opzelura Pregnancy And Lactation Text: There is insufficient data to evaluate drug-associated risk for major birth defects, miscarriage, or other adverse maternal or fetal outcomes.  There is a pregnancy registry that monitors pregnancy outcomes in pregnant persons exposed to the medication during pregnancy.  It is unknown if this medication is excreted in breast milk.  Do not breastfeed during treatment and for about 4 weeks after the last dose.
Colchicine Pregnancy And Lactation Text: This medication is Pregnancy Category C and isn't considered safe during pregnancy. It is excreted in breast milk.
Itraconazole Counseling:  I discussed with the patient the risks of itraconazole including but not limited to liver damage, nausea/vomiting, neuropathy, and severe allergy.  The patient understands that this medication is best absorbed when taken with acidic beverages such as non-diet cola or ginger ale.  The patient understands that monitoring is required including baseline LFTs and repeat LFTs at intervals.  The patient understands that they are to contact us or the primary physician if concerning signs are noted.
Doxepin Pregnancy And Lactation Text: This medication is Pregnancy Category C and it isn't known if it is safe during pregnancy. It is also excreted in breast milk and breast feeding isn't recommended.
Topical Sulfur Applications Counseling: Topical Sulfur Counseling: Patient counseled that this medication may cause skin irritation or allergic reactions.  In the event of skin irritation, the patient was advised to reduce the amount of the drug applied or use it less frequently.   The patient verbalized understanding of the proper use and possible adverse effects of topical sulfur application.  All of the patient's questions and concerns were addressed.
Imiquimod Counseling:  I discussed with the patient the risks of imiquimod including but not limited to erythema, scaling, itching, weeping, crusting, and pain.  Patient understands that the inflammatory response to imiquimod is variable from person to person and was educated regarded proper titration schedule.  If flu-like symptoms develop, patient knows to discontinue the medication and contact us.
Erythromycin Counseling:  I discussed with the patient the risks of erythromycin including but not limited to GI upset, allergic reaction, drug rash, diarrhea, increase in liver enzymes, and yeast infections.
Acitretin Pregnancy And Lactation Text: This medication is Pregnancy Category X and should not be given to women who are pregnant or may become pregnant in the future. This medication is excreted in breast milk.
Nsaids Counseling: NSAID Counseling: I discussed with the patient that NSAIDs should be taken with food. Prolonged use of NSAIDs can result in the development of stomach ulcers.  Patient advised to stop taking NSAIDs if abdominal pain occurs.  The patient verbalized understanding of the proper use and possible adverse effects of NSAIDs.  All of the patient's questions and concerns were addressed.
Azithromycin Pregnancy And Lactation Text: This medication is considered safe during pregnancy and is also secreted in breast milk.
Cyclophosphamide Counseling:  I discussed with the patient the risks of cyclophosphamide including but not limited to hair loss, hormonal abnormalities, decreased fertility, abdominal pain, diarrhea, nausea and vomiting, bone marrow suppression and infection. The patient understands that monitoring is required while taking this medication.
Opioid Pregnancy And Lactation Text: These medications can lead to premature delivery and should be avoided during pregnancy. These medications are also present in breast milk in small amounts.
Simponi Counseling:  I discussed with the patient the risks of golimumab including but not limited to myelosuppression, immunosuppression, autoimmune hepatitis, demyelinating diseases, lymphoma, and serious infections.  The patient understands that monitoring is required including a PPD at baseline and must alert us or the primary physician if symptoms of infection or other concerning signs are noted.
Solaraze Pregnancy And Lactation Text: This medication is Pregnancy Category B and is considered safe. There is some data to suggest avoiding during the third trimester. It is unknown if this medication is excreted in breast milk.
5-Fu Pregnancy And Lactation Text: This medication is Pregnancy Category X and contraindicated in pregnancy and in women who may become pregnant. It is unknown if this medication is excreted in breast milk.
Sarecycline Counseling: Patient advised regarding possible photosensitivity and discoloration of the teeth, skin, lips, tongue and gums.  Patient instructed to avoid sunlight, if possible.  When exposed to sunlight, patients should wear protective clothing, sunglasses, and sunscreen.  The patient was instructed to call the office immediately if the following severe adverse effects occur:  hearing changes, easy bruising/bleeding, severe headache, or vision changes.  The patient verbalized understanding of the proper use and possible adverse effects of sarecycline.  All of the patient's questions and concerns were addressed.
Benzoyl Peroxide Counseling: Patient counseled that medicine may cause skin irritation and bleach clothing.  In the event of skin irritation, the patient was advised to reduce the amount of the drug applied or use it less frequently.   The patient verbalized understanding of the proper use and possible adverse effects of benzoyl peroxide.  All of the patient's questions and concerns were addressed.
Propranolol Counseling:  I discussed with the patient the risks of propranolol including but not limited to low heart rate, low blood pressure, low blood sugar, restlessness and increased cold sensitivity. They should call the office if they experience any of these side effects.
Picato Counseling:  I discussed with the patient the risks of Picato including but not limited to erythema, scaling, itching, weeping, crusting, and pain.
Dupixent Counseling: I discussed with the patient the risks of dupilumab including but not limited to eye infection and irritation, cold sores, injection site reactions, worsening of asthma, allergic reactions and increased risk of parasitic infection.  Live vaccines should be avoided while taking dupilumab. Dupilumab will also interact with certain medications such as warfarin and cyclosporine. The patient understands that monitoring is required and they must alert us or the primary physician if symptoms of infection or other concerning signs are noted.
Dapsone Counseling: I discussed with the patient the risks of dapsone including but not limited to hemolytic anemia, agranulocytosis, rashes, methemoglobinemia, kidney failure, peripheral neuropathy, headaches, GI upset, and liver toxicity.  Patients who start dapsone require monitoring including baseline LFTs and weekly CBCs for the first month, then every month thereafter.  The patient verbalized understanding of the proper use and possible adverse effects of dapsone.  All of the patient's questions and concerns were addressed.
Erythromycin Pregnancy And Lactation Text: This medication is Pregnancy Category B and is considered safe during pregnancy. It is also excreted in breast milk.
Cyclophosphamide Pregnancy And Lactation Text: This medication is Pregnancy Category D and it isn't considered safe during pregnancy. This medication is excreted in breast milk.
Xeljanz Counseling: I discussed with the patient the risks of Xeljanz therapy including increased risk of infection, liver issues, headache, diarrhea, or cold symptoms. Live vaccines should be avoided. They were instructed to call if they have any problems.
Topical Sulfur Applications Pregnancy And Lactation Text: This medication is Pregnancy Category C and has an unknown safety profile during pregnancy. It is unknown if this topical medication is excreted in breast milk.
Nsaids Pregnancy And Lactation Text: These medications are considered safe up to 30 weeks gestation. It is excreted in breast milk.
Hydroxyzine Counseling: Patient advised that the medication is sedating and not to drive a car after taking this medication.  Patient informed of potential adverse effects including but not limited to dry mouth, urinary retention, and blurry vision.  The patient verbalized understanding of the proper use and possible adverse effects of hydroxyzine.  All of the patient's questions and concerns were addressed.
Topical Retinoid counseling:  Patient advised to apply a pea-sized amount only at bedtime and wait 30 minutes after washing their face before applying.  If too drying, patient may add a non-comedogenic moisturizer. The patient verbalized understanding of the proper use and possible adverse effects of retinoids.  All of the patient's questions and concerns were addressed.
Drysol Counseling:  I discussed with the patient the risks of drysol/aluminum chloride including but not limited to skin rash, itching, irritation, burning.
Bactrim Counseling:  I discussed with the patient the risks of sulfa antibiotics including but not limited to GI upset, allergic reaction, drug rash, diarrhea, dizziness, photosensitivity, and yeast infections.  Rarely, more serious reactions can occur including but not limited to aplastic anemia, agranulocytosis, methemoglobinemia, blood dyscrasias, liver or kidney failure, lung infiltrates or desquamative/blistering drug rashes.
Bexarotene Counseling:  I discussed with the patient the risks of bexarotene including but not limited to hair loss, dry lips/skin/eyes, liver abnormalities, hyperlipidemia, pancreatitis, depression/suicidal ideation, photosensitivity, drug rash/allergic reactions, hypothyroidism, anemia, leukopenia, infection, cataracts, and teratogenicity.  Patient understands that they will need regular blood tests to check lipid profile, liver function tests, white blood cell count, thyroid function tests and pregnancy test if applicable.
Glycopyrrolate Counseling:  I discussed with the patient the risks of glycopyrrolate including but not limited to skin rash, drowsiness, dry mouth, difficulty urinating, and blurred vision.
Tranexamic Acid Counseling:  Patient advised of the small risk of bleeding problems with tranexamic acid. They were also instructed to call if they developed any nausea, vomiting or diarrhea. All of the patient's questions and concerns were addressed.
Olumiant Counseling: I discussed with the patient the risks of Olumiant therapy including but not limited to upper respiratory tract infections, shingles, cold sores, and nausea. Live vaccines should be avoided.  This medication has been linked to serious infections; higher rate of mortality; malignancy and lymphoproliferative disorders; major adverse cardiovascular events; thrombosis; gastrointestinal perforations; neutropenia; lymphopenia; anemia; liver enzyme elevations; and lipid elevations.
Arava Counseling:  Patient counseled regarding adverse effects of Arava including but not limited to nausea, vomiting, abnormalities in liver function tests. Patients may develop mouth sores, rash, diarrhea, and abnormalities in blood counts. The patient understands that monitoring is required including LFTs and blood counts.  There is a rare possibility of scarring of the liver and lung problems that can occur when taking methotrexate. Persistent nausea, loss of appetite, pale stools, dark urine, cough, and shortness of breath should be reported immediately. Patient advised to discontinue Arava treatment and consult with a physician prior to attempting conception. The patient will have to undergo a treatment to eliminate Arava from the body prior to conception.
Benzoyl Peroxide Pregnancy And Lactation Text: This medication is Pregnancy Category C. It is unknown if benzoyl peroxide is excreted in breast milk.
Sarecycline Pregnancy And Lactation Text: This medication is Pregnancy Category D and not consider safe during pregnancy. It is also excreted in breast milk.
Dupixent Pregnancy And Lactation Text: This medication likely crosses the placenta but the risk for the fetus is uncertain. This medication is excreted in breast milk.
Dapsone Pregnancy And Lactation Text: This medication is Pregnancy Category C and is not considered safe during pregnancy or breast feeding.
Propranolol Pregnancy And Lactation Text: This medication is Pregnancy Category C and it isn't known if it is safe during pregnancy. It is excreted in breast milk.
Ketoconazole Counseling:   Patient counseled regarding improving absorption with orange juice.  Adverse effects include but are not limited to breast enlargement, headache, diarrhea, nausea, upset stomach, liver function test abnormalities, taste disturbance, and stomach pain.  There is a rare possibility of liver failure that can occur when taking ketoconazole. The patient understands that monitoring of LFTs may be required, especially at baseline. The patient verbalized understanding of the proper use and possible adverse effects of ketoconazole.  All of the patient's questions and concerns were addressed.
Klisyri Counseling:  I discussed with the patient the risks of Klisyri including but not limited to erythema, scaling, itching, weeping, crusting, and pain.
Metronidazole Counseling:  I discussed with the patient the risks of metronidazole including but not limited to seizures, nausea/vomiting, a metallic taste in the mouth, nausea/vomiting and severe allergy.
Cyclosporine Counseling:  I discussed with the patient the risks of cyclosporine including but not limited to hypertension, gingival hyperplasia,myelosuppression, immunosuppression, liver damage, kidney damage, neurotoxicity, lymphoma, and serious infections. The patient understands that monitoring is required including baseline blood pressure, CBC, CMP, lipid panel and uric acid, and then 1-2 times monthly CMP and blood pressure.
Xelravinderz Pregnancy And Lactation Text: This medication is Pregnancy Category D and is not considered safe during pregnancy.  The risk during breast feeding is also uncertain.
Wartpeel Counseling:  I discussed with the patient the risks of Wartpeel including but not limited to erythema, scaling, itching, weeping, crusting, and pain.
Hydroxyzine Pregnancy And Lactation Text: This medication is not safe during pregnancy and should not be taken. It is also excreted in breast milk and breast feeding isn't recommended.
Odomzo Counseling- I discussed with the patient the risks of Odomzo including but not limited to nausea, vomiting, diarrhea, constipation, weight loss, changes in the sense of taste, decreased appetite, muscle spasms, and hair loss.  The patient verbalized understanding of the proper use and possible adverse effects of Odomzo.  All of the patient's questions and concerns were addressed.
Skyrizi Counseling: I discussed with the patient the risks of risankizumab-rzaa including but not limited to immunosuppression, and serious infections.  The patient understands that monitoring is required including a PPD at baseline and must alert us or the primary physician if symptoms of infection or other concerning signs are noted.
Adbry Counseling: I discussed with the patient the risks of tralokinumab including but not limited to eye infection and irritation, cold sores, injection site reactions, worsening of asthma, allergic reactions and increased risk of parasitic infection.  Live vaccines should be avoided while taking tralokinumab. The patient understands that monitoring is required and they must alert us or the primary physician if symptoms of infection or other concerning signs are noted.
Bexarotene Pregnancy And Lactation Text: This medication is Pregnancy Category X and should not be given to women who are pregnant or may become pregnant. This medication should not be used if you are breast feeding.
Bactrim Pregnancy And Lactation Text: This medication is Pregnancy Category D and is known to cause fetal risk.  It is also excreted in breast milk.
Tranexamic Acid Pregnancy And Lactation Text: It is unknown if this medication is safe during pregnancy or breast feeding.
Olumiant Pregnancy And Lactation Text: Based on animal studies, Olumiant may cause embryo-fetal harm when administered to pregnant women.  The medication should not be used in pregnancy.  Breastfeeding is not recommended during treatment.
Glycopyrrolate Pregnancy And Lactation Text: This medication is Pregnancy Category B and is considered safe during pregnancy. It is unknown if it is excreted breast milk.
Tetracycline Counseling: Patient counseled regarding possible photosensitivity and increased risk for sunburn.  Patient instructed to avoid sunlight, if possible.  When exposed to sunlight, patients should wear protective clothing, sunglasses, and sunscreen.  The patient was instructed to call the office immediately if the following severe adverse effects occur:  hearing changes, easy bruising/bleeding, severe headache, or vision changes.  The patient verbalized understanding of the proper use and possible adverse effects of tetracycline.  All of the patient's questions and concerns were addressed. Patient understands to avoid pregnancy while on therapy due to potential birth defects.
Dutasteride Male Counseling: Dustasteride Counseling:  I discussed with the patient the risks of use of dutasteride including but not limited to decreased libido, decreased ejaculate volume, and gynecomastia. Women who can become pregnant should not handle medication.  All of the patient's questions and concerns were addressed.
Protopic Counseling: Patient may experience a mild burning sensation during topical application. Protopic is not approved in children less than 2 years of age. There have been case reports of hematologic and skin malignancies in patients using topical calcineurin inhibitors although causality is questionable.
Carac Counseling:  I discussed with the patient the risks of Carac including but not limited to erythema, scaling, itching, weeping, crusting, and pain.
Birth Control Pills Counseling: Birth Control Pill Counseling: I discussed with the patient the potential side effects of OCPs including but not limited to increased risk of stroke, heart attack, thrombophlebitis, deep venous thrombosis, hepatic adenomas, breast changes, GI upset, headaches, and depression.  The patient verbalized understanding of the proper use and possible adverse effects of OCPs. All of the patient's questions and concerns were addressed.
Ketoconazole Pregnancy And Lactation Text: This medication is Pregnancy Category C and it isn't know if it is safe during pregnancy. It is also excreted in breast milk and breast feeding isn't recommended.
Xolair Counseling:  Patient informed of potential adverse effects including but not limited to fever, muscle aches, rash and allergic reactions.  The patient verbalized understanding of the proper use and possible adverse effects of Xolair.  All of the patient's questions and concerns were addressed.
Metronidazole Pregnancy And Lactation Text: This medication is Pregnancy Category B and considered safe during pregnancy.  It is also excreted in breast milk.
Enbrel Counseling:  I discussed with the patient the risks of etanercept including but not limited to myelosuppression, immunosuppression, autoimmune hepatitis, demyelinating diseases, lymphoma, and infections.  The patient understands that monitoring is required including a PPD at baseline and must alert us or the primary physician if symptoms of infection or other concerning signs are noted.
Klisyri Pregnancy And Lactation Text: It is unknown if this medication can harm a developing fetus or if it is excreted in breast milk.
Adbry Pregnancy And Lactation Text: It is unknown if this medication will adversely affect pregnancy or breast feeding.
Elidel Counseling: Patient may experience a mild burning sensation during topical application. Elidel is not approved in children less than 2 years of age. There have been case reports of hematologic and skin malignancies in patients using topical calcineurin inhibitors although causality is questionable.
Cephalexin Counseling: I counseled the patient regarding use of cephalexin as an antibiotic for prophylactic and/or therapeutic purposes. Cephalexin (commonly prescribed under brand name Keflex) is a cephalosporin antibiotic which is active against numerous classes of bacteria, including most skin bacteria. Side effects may include nausea, diarrhea, gastrointestinal upset, rash, hives, yeast infections, and in rare cases, hepatitis, kidney disease, seizures, fever, confusion, neurologic symptoms, and others. Patients with severe allergies to penicillin medications are cautioned that there is about a 10% incidence of cross-reactivity with cephalosporins. When possible, patients with penicillin allergies should use alternatives to cephalosporins for antibiotic therapy.
Isotretinoin Counseling: Patient should get monthly blood tests, not donate blood, not drive at night if vision affected, not share medication, and not undergo elective surgery for 6 months after tx completed. Side effects reviewed, pt to contact office should one occur.
Tazorac Counseling:  Patient advised that medication is irritating and drying.  Patient may need to apply sparingly and wash off after an hour before eventually leaving it on overnight.  The patient verbalized understanding of the proper use and possible adverse effects of tazorac.  All of the patient's questions and concerns were addressed.
Rinvoq Counseling: I discussed with the patient the risks of Rinvoq therapy including but not limited to upper respiratory tract infections, shingles, cold sores, bronchitis, nausea, cough, fever, acne, and headache. Live vaccines should be avoided.  This medication has been linked to serious infections; higher rate of mortality; malignancy and lymphoproliferative disorders; major adverse cardiovascular events; thrombosis; thrombocytopenia, anemia, and neutropenia; lipid elevations; liver enzyme elevations; and gastrointestinal perforations.
Valtrex Counseling: I discussed with the patient the risks of valacyclovir including but not limited to kidney damage, nausea, vomiting and severe allergy.  The patient understands that if the infection seems to be worsening or is not improving, they are to call.
Hydroxychloroquine Counseling:  I discussed with the patient that a baseline ophthalmologic exam is needed at the start of therapy and every year thereafter while on therapy. A CBC may also be warranted for monitoring.  The side effects of this medication were discussed with the patient, including but not limited to agranulocytosis, aplastic anemia, seizures, rashes, retinopathy, and liver toxicity. Patient instructed to call the office should any adverse effect occur.  The patient verbalized understanding of the proper use and possible adverse effects of Plaquenil.  All the patient's questions and concerns were addressed.
Clofazimine Counseling:  I discussed with the patient the risks of clofazimine including but not limited to skin and eye pigmentation, liver damage, nausea/vomiting, gastrointestinal bleeding and allergy.
Albendazole Counseling:  I discussed with the patient the risks of albendazole including but not limited to cytopenia, kidney damage, nausea/vomiting and severe allergy.  The patient understands that this medication is being used in an off-label manner.
Dutasteride Pregnancy And Lactation Text: This medication is absolutely contraindicated in women, especially during pregnancy and breast feeding. Feminization of male fetuses is possible if taking while pregnant.
Protopic Pregnancy And Lactation Text: This medication is Pregnancy Category C. It is unknown if this medication is excreted in breast milk when applied topically.
Aklief Pregnancy And Lactation Text: It is unknown if this medication is safe to use during pregnancy.  It is unknown if this medication is excreted in breast milk.  Breastfeeding women should use the topical cream on the smallest area of the skin for the shortest time needed while breastfeeding.  Do not apply to nipple and areola.
Terbinafine Counseling: Patient counseling regarding adverse effects of terbinafine including but not limited to headache, diarrhea, rash, upset stomach, liver function test abnormalities, itching, taste/smell disturbance, nausea, abdominal pain, and flatulence.  There is a rare possibility of liver failure that can occur when taking terbinafine.  The patient understands that a baseline LFT and kidney function test may be required. The patient verbalized understanding of the proper use and possible adverse effects of terbinafine.  All of the patient's questions and concerns were addressed.
Birth Control Pills Pregnancy And Lactation Text: This medication should be avoided if pregnant and for the first 30 days post-partum.
Xolair Pregnancy And Lactation Text: This medication is Pregnancy Category B and is considered safe during pregnancy. This medication is excreted in breast milk.
Minoxidil Counseling: Minoxidil is a topical medication which can increase blood flow where it is applied. It is uncertain how this medication increases hair growth. Side effects are uncommon and include stinging and allergic reactions.
Winlevi Counseling:  I discussed with the patient the risks of topical clascoterone including but not limited to erythema, scaling, itching, and stinging. Patient voiced their understanding.
Minocycline Counseling: Patient advised regarding possible photosensitivity and discoloration of the teeth, skin, lips, tongue and gums.  Patient instructed to avoid sunlight, if possible.  When exposed to sunlight, patients should wear protective clothing, sunglasses, and sunscreen.  The patient was instructed to call the office immediately if the following severe adverse effects occur:  hearing changes, easy bruising/bleeding, severe headache, or vision changes.  The patient verbalized understanding of the proper use and possible adverse effects of minocycline.  All of the patient's questions and concerns were addressed.
Cibinqo Counseling: I discussed with the patient the risks of Cibinqo therapy including but not limited to common cold, nausea, headache, cold sores, increased blood CPK levels, dizziness, UTIs, fatigue, acne, and vomitting. Live vaccines should be avoided.  This medication has been linked to serious infections; higher rate of mortality; malignancy and lymphoproliferative disorders; major adverse cardiovascular events; thrombosis; thrombocytopenia and lymphopenia; lipid elevations; and retinal detachment.
Oral Minoxidil Counseling- I discussed with the patient the risks of oral minoxidil including but not limited to shortness of breath, swelling of the feet or ankles, dizziness, lightheadedness, unwanted hair growth and allergic reaction.  The patient verbalized understanding of the proper use and possible adverse effects of oral minoxidil.  All of the patient's questions and concerns were addressed.
Isotretinoin Pregnancy And Lactation Text: This medication is Pregnancy Category X and is considered extremely dangerous during pregnancy. It is unknown if it is excreted in breast milk.
Cephalexin Pregnancy And Lactation Text: This medication is Pregnancy Category B and considered safe during pregnancy.  It is also excreted in breast milk but can be used safely for shorter doses.
Methotrexate Counseling:  Patient counseled regarding adverse effects of methotrexate including but not limited to nausea, vomiting, abnormalities in liver function tests. Patients may develop mouth sores, rash, diarrhea, and abnormalities in blood counts. The patient understands that monitoring is required including LFT's and blood counts.  There is a rare possibility of scarring of the liver and lung problems that can occur when taking methotrexate. Persistent nausea, loss of appetite, pale stools, dark urine, cough, and shortness of breath should be reported immediately. Patient advised to discontinue methotrexate treatment at least three months before attempting to become pregnant.  I discussed the need for folate supplements while taking methotrexate.  These supplements can decrease side effects during methotrexate treatment. The patient verbalized understanding of the proper use and possible adverse effects of methotrexate.  All of the patient's questions and concerns were addressed.
Stelara Counseling:  I discussed with the patient the risks of ustekinumab including but not limited to immunosuppression, malignancy, posterior leukoencephalopathy syndrome, and serious infections.  The patient understands that monitoring is required including a PPD at baseline and must alert us or the primary physician if symptoms of infection or other concerning signs are noted.
Detail Level: Generalized
Hydroxychloroquine Pregnancy And Lactation Text: This medication has been shown to cause fetal harm but it isn't assigned a Pregnancy Risk Category. There are small amounts excreted in breast milk.
Tazorac Pregnancy And Lactation Text: This medication is not safe during pregnancy. It is unknown if this medication is excreted in breast milk.
Valtrex Pregnancy And Lactation Text: this medication is Pregnancy Category B and is considered safe during pregnancy. This medication is not directly found in breast milk but it's metabolite acyclovir is present.
Rinvoq Pregnancy And Lactation Text: Based on animal studies, Rinvoq may cause embryo-fetal harm when administered to pregnant women.  The medication should not be used in pregnancy.  Breastfeeding is not recommended during treatment and for 6 days after the last dose.
Qbrexza Counseling:  I discussed with the patient the risks of Qbrexza including but not limited to headache, mydriasis, blurred vision, dry eyes, nasal dryness, dry mouth, dry throat, dry skin, urinary hesitation, and constipation.  Local skin reactions including erythema, burning, stinging, and itching can also occur.
Spironolactone Counseling: Patient advised regarding risks of diarrhea, abdominal pain, hyperkalemia, birth defects (for female patients), liver toxicity and renal toxicity. The patient may need blood work to monitor liver and kidney function and potassium levels while on therapy. The patient verbalized understanding of the proper use and possible adverse effects of spironolactone.  All of the patient's questions and concerns were addressed.
Humira Counseling:  I discussed with the patient the risks of adalimumab including but not limited to myelosuppression, immunosuppression, autoimmune hepatitis, demyelinating diseases, lymphoma, and serious infections.  The patient understands that monitoring is required including a PPD at baseline and must alert us or the primary physician if symptoms of infection or other concerning signs are noted.
Erivedge Counseling- I discussed with the patient the risks of Erivedge including but not limited to nausea, vomiting, diarrhea, constipation, weight loss, changes in the sense of taste, decreased appetite, muscle spasms, and hair loss.  The patient verbalized understanding of the proper use and possible adverse effects of Erivedge.  All of the patient's questions and concerns were addressed.
Winlevi Pregnancy And Lactation Text: This medication is considered safe during pregnancy and breastfeeding.
Cibinqo Pregnancy And Lactation Text: It is unknown if this medication will adversely affect pregnancy or breast feeding.  You should not take this medication if you are currently pregnant or planning a pregnancy or while breastfeeding.
Fluconazole Counseling:  Patient counseled regarding adverse effects of fluconazole including but not limited to headache, diarrhea, nausea, upset stomach, liver function test abnormalities, taste disturbance, and stomach pain.  There is a rare possibility of liver failure that can occur when taking fluconazole.  The patient understands that monitoring of LFTs and kidney function test may be required, especially at baseline. The patient verbalized understanding of the proper use and possible adverse effects of fluconazole.  All of the patient's questions and concerns were addressed.
Oral Minoxidil Pregnancy And Lactation Text: This medication should only be used when clearly needed if you are pregnant, attempting to become pregnant or breast feeding.
High Dose Vitamin A Counseling: Side effects reviewed, pt to contact office should one occur.
Clindamycin Counseling: I counseled the patient regarding use of clindamycin as an antibiotic for prophylactic and/or therapeutic purposes. Clindamycin is active against numerous classes of bacteria, including skin bacteria. Side effects may include nausea, diarrhea, gastrointestinal upset, rash, hives, yeast infections, and in rare cases, colitis.
Methotrexate Pregnancy And Lactation Text: This medication is Pregnancy Category X and is known to cause fetal harm. This medication is excreted in breast milk.
Libtayo Counseling- I discussed with the patient the risks of Libtayo including but not limited to nausea, vomiting, diarrhea, and bone or muscle pain.  The patient verbalized understanding of the proper use and possible adverse effects of Libtayo.  All of the patient's questions and concerns were addressed.
Azathioprine Counseling:  I discussed with the patient the risks of azathioprine including but not limited to myelosuppression, immunosuppression, hepatotoxicity, lymphoma, and infections.  The patient understands that monitoring is required including baseline LFTs, Creatinine, possible TPMP genotyping and weekly CBCs for the first month and then every 2 weeks thereafter.  The patient verbalized understanding of the proper use and possible adverse effects of azathioprine.  All of the patient's questions and concerns were addressed.
Topical Clindamycin Counseling: Patient counseled that this medication may cause skin irritation or allergic reactions.  In the event of skin irritation, the patient was advised to reduce the amount of the drug applied or use it less frequently.   The patient verbalized understanding of the proper use and possible adverse effects of clindamycin.  All of the patient's questions and concerns were addressed.
Eucrisa Counseling: Patient may experience a mild burning sensation during topical application. Eucrisa is not approved in children less than 2 years of age.

## 2022-08-18 ENCOUNTER — APPOINTMENT (RX ONLY)
Dept: URBAN - METROPOLITAN AREA CLINIC 31 | Facility: CLINIC | Age: 72
Setting detail: DERMATOLOGY
End: 2022-08-18

## 2022-08-18 PROBLEM — D04.62 CARCINOMA IN SITU OF SKIN OF LEFT UPPER LIMB, INCLUDING SHOULDER: Status: ACTIVE | Noted: 2022-08-18

## 2022-08-18 PROCEDURE — 17311 MOHS 1 STAGE H/N/HF/G: CPT

## 2022-08-18 PROCEDURE — ? MEDICATION COUNSELING

## 2022-08-18 PROCEDURE — 13132 CMPLX RPR F/C/C/M/N/AX/G/H/F: CPT

## 2022-08-18 PROCEDURE — ? PRESCRIPTION

## 2022-08-18 PROCEDURE — ? MOHS SURGERY

## 2022-08-18 RX ORDER — DOXYCYCLINE HYCLATE 100 MG/1
1 CAPSULE, GELATIN COATED ORAL BID
Qty: 20 | Refills: 0 | Status: ERX | COMMUNITY
Start: 2022-08-18 | End: 2022-08-21

## 2022-08-18 RX ADMIN — DOXYCYCLINE HYCLATE 1: 100 CAPSULE, GELATIN COATED ORAL at 00:00

## 2022-08-18 NOTE — PROCEDURE: MIPS QUALITY
Quality 130: Documentation Of Current Medications In The Medical Record: Current Medications Documented
Detail Level: Detailed
Quality 431: Preventive Care And Screening: Unhealthy Alcohol Use - Screening: Patient not identified as an unhealthy alcohol user when screened for unhealthy alcohol use using a systematic screening method
Quality 265: Biopsy Follow-Up: Biopsy results reviewed, communicated, tracked, and documented
Quality 226: Preventive Care And Screening: Tobacco Use: Screening And Cessation Intervention: Patient screened for tobacco use and is an ex/non-smoker
Quality 111:Pneumonia Vaccination Status For Older Adults: Pneumococcal Vaccination Previously Received

## 2022-08-18 NOTE — PROCEDURE: MOHS SURGERY
Otolaryngologist Procedure Text (C): After obtaining clear surgical margins the patient was sent to otolaryngology for surgical repair.  The patient understands they will receive post-surgical care and follow-up from the referring physician's office.
Is There Documentation In The Chart Showing Discussion Of Changes With Another Physician?: Please Select the Appropriate Response
Xenograft Text: The defect edges were debeveled with a #15 scalpel blade.  Given the location of the defect, shape of the defect and the proximity to free margins a xenograft was deemed most appropriate.  The graft was then trimmed to fit the size of the defect.  The graft was then placed in the primary defect and oriented appropriately.
Simple / Intermediate / Complex Repair - Final Wound Length In Cm: 3.2
Consent 2/Introductory Paragraph: Mohs surgery was explained to the patient and consent was obtained. The risks, benefits and alternatives to therapy were discussed in detail. Specifically, the risks of infection, scarring, bleeding, prolonged wound healing, incomplete removal, allergy to anesthesia, nerve injury and recurrence were addressed. Prior to the procedure, the treatment site was clearly identified and confirmed by the patient. All components of Universal Protocol/PAUSE Rule completed.
Dorsal Nasal Flap Text: The defect edges were debeveled with a #15 scalpel blade.  Given the location of the defect and the proximity to free margins a dorsal nasal flap was deemed most appropriate.  Using a sterile surgical marker, an appropriate dorsal nasal flap was drawn around the defect.    The area thus outlined was incised deep to adipose tissue with a #15 scalpel blade.  The skin margins were undermined to an appropriate distance in all directions utilizing iris scissors.
Special Stains Stage 11 - Results: Base On Clearance Noted Above
Epidermal Closure Graft Donor Site (Optional): running
Banner Transposition Flap Text: The defect edges were debeveled with a #15 scalpel blade.  Given the location of the defect and the proximity to free margins a Banner transposition flap was deemed most appropriate.  Using a sterile surgical marker, an appropriate flap drawn around the defect. The area thus outlined was incised deep to adipose tissue with a #15 scalpel blade.  The skin margins were undermined to an appropriate distance in all directions utilizing iris scissors.
Stage 10: Number Of Blocks?: 0
Lazy S Complex Repair Preamble Text (Leave Blank If You Do Not Want): Extensive wide undermining was performed.
Stage 3: Additional Anesthesia Type: 1% lidocaine with epinephrine
Donor Site Anesthesia Type: same as repair anesthesia
Distance Of Undermining In Cm (Required): 1.5
Tarsorrhaphy Text: A tarsorrhaphy was performed using Frost sutures.
Cheek Interpolation Flap Text: A decision was made to reconstruct the defect utilizing an interpolation axial flap and a staged reconstruction.  A telfa template was made of the defect.  This telfa template was then used to outline the Cheek Interpolation flap.  The donor area for the pedicle flap was then injected with anesthesia.  The flap was excised through the skin and subcutaneous tissue down to the layer of the underlying musculature.  The interpolation flap was carefully excised within this deep plane to maintain its blood supply.  The edges of the donor site were undermined.   The donor site was closed in a primary fashion.  The pedicle was then rotated into position and sutured.  Once the tube was sutured into place, adequate blood supply was confirmed with blanching and refill.  The pedicle was then wrapped with xeroform gauze and dressed appropriately with a telfa and gauze bandage to ensure continued blood supply and protect the attached pedicle.
Mid-Level Procedure Text (A): After obtaining clear surgical margins the patient was sent to a mid-level provider for surgical repair.  The patient understands they will receive post-surgical care and follow-up from the mid-level provider.
Asc Procedure Text (C): After obtaining clear surgical margins the patient was sent to an ASC for surgical repair.  The patient understands they will receive post-surgical care and follow-up from the ASC physician.
Zygomaticofacial Flap Text: Given the location of the defect, shape of the defect and the proximity to free margins a zygomaticofacial flap was deemed most appropriate for repair.  Using a sterile surgical marker, the appropriate flap was drawn incorporating the defect and placing the expected incisions within the relaxed skin tension lines where possible. The area thus outlined was incised deep to adipose tissue with a #15 scalpel blade with preservation of a vascular pedicle.  The skin margins were undermined to an appropriate distance in all directions utilizing iris scissors.  The flap was then placed into the defect and anchored with interrupted buried subcutaneous sutures.
Provider Procedure Text (F): After obtaining clear surgical margins the defect was repaired by another provider.
Mohs Histo Method Verbiage: Each section was then chromacoded and processed in the Mohs lab using the Mohs protocol and submitted for frozen section.
Consent (Nose)/Introductory Paragraph: The rationale for Mohs was explained to the patient and consent was obtained. The risks, benefits and alternatives to therapy were discussed in detail. Specifically, the risks of nasal deformity, changes in the flow of air through the nose, infection, scarring, bleeding, prolonged wound healing, incomplete removal, allergy to anesthesia, nerve injury and recurrence were addressed. Prior to the procedure, the treatment site was clearly identified and confirmed by the patient. All components of Universal Protocol/PAUSE Rule completed.
Referred To Oculoplastics For Closure Text (Leave Blank If You Do Not Want): After obtaining clear surgical margins the patient was sent to oculoplastics for surgical repair.  The patient understands they will receive post-surgical care and follow-up from the referring physician's office.
Bone Involvement: No
Dressing (No Sutures): pressure dressing with telfa
Brow Lift Text: A midfrontal incision was made medially to the defect to allow access to the tissues just superior to the left eyebrow. Following careful dissection inferiorly in a supraperiosteal plane to the level of the left eyebrow, several 3-0 monocryl sutures were used to resuspend the eyebrow orbicularis oculi muscular unit to the superior frontal bone periosteum. This resulted in an appropriate reapproximation of static eyebrow symmetry and correction of the left brow ptosis.
No Repair - Repaired With Adjacent Surgical Defect Text (Leave Blank If You Do Not Want): After obtaining clear surgical margins the defect was repaired concurrently with another surgical defect which was in close approximation.
Display The Frozen Section And Histology As A Separate Paragraph: Yes
Deep Sutures: 3-0 Polysorb
Island Pedicle Flap-Requiring Vessel Identification Text: The defect edges were debeveled with a #15 scalpel blade.  Given the location of the defect, shape of the defect and the proximity to free margins an island pedicle advancement flap was deemed most appropriate.  Using a sterile surgical marker, an appropriate advancement flap was drawn, based on the axial vessel mentioned above, incorporating the defect, outlining the appropriate donor tissue and placing the expected incisions within the relaxed skin tension lines where possible.    The area thus outlined was incised deep to adipose tissue with a #15 scalpel blade.  The skin margins were undermined to an appropriate distance in all directions around the primary defect and laterally outward around the island pedicle utilizing iris scissors.  There was minimal undermining beneath the pedicle flap.
Split-Thickness Skin Graft Text: The defect edges were debeveled with a #15 scalpel blade.  Given the location of the defect, shape of the defect and the proximity to free margins a split thickness skin graft was deemed most appropriate.  Using a sterile surgical marker, the primary defect shape was transferred to the donor site. The split thickness graft was then harvested.  The skin graft was then placed in the primary defect and oriented appropriately.
O-Z Flap Text: The defect edges were debeveled with a #15 scalpel blade.  Given the location of the defect, shape of the defect and the proximity to free margins an O-Z flap was deemed most appropriate.  Using a sterile surgical marker, an appropriate transposition flap was drawn incorporating the defect and placing the expected incisions within the relaxed skin tension lines where possible. The area thus outlined was incised deep to adipose tissue with a #15 scalpel blade.  The skin margins were undermined to an appropriate distance in all directions utilizing iris scissors.
Chonodrocutaneous Helical Advancement Flap Text: The defect edges were debeveled with a #15 scalpel blade.  Given the location of the defect and the proximity to free margins a chondrocutaneous helical advancement flap was deemed most appropriate.  Using a sterile surgical marker, the appropriate advancement flap was drawn incorporating the defect and placing the expected incisions within the relaxed skin tension lines where possible.    The area thus outlined was incised deep to adipose tissue with a #15 scalpel blade.  The skin margins were undermined to an appropriate distance in all directions utilizing iris scissors.
Consent 3/Introductory Paragraph: I gave the patient a chance to ask questions they had about the procedure.  Following this I explained the Mohs procedure and consent was obtained. The risks, benefits and alternatives to therapy were discussed in detail. Specifically, the risks of infection, scarring, bleeding, prolonged wound healing, incomplete removal, allergy to anesthesia, nerve injury and recurrence were addressed. Prior to the procedure, the treatment site was clearly identified and confirmed by the patient. All components of Universal Protocol/PAUSE Rule completed.
Crescentic Advancement Flap Text: The defect edges were debeveled with a #15 scalpel blade.  Given the location of the defect and the proximity to free margins a crescentic advancement flap was deemed most appropriate.  Using a sterile surgical marker, the appropriate advancement flap was drawn incorporating the defect and placing the expected incisions within the relaxed skin tension lines where possible.    The area thus outlined was incised deep to adipose tissue with a #15 scalpel blade.  The skin margins were undermined to an appropriate distance in all directions utilizing iris scissors.
Full Thickness Lip Wedge Repair (Flap) Text: Given the location of the defect and the proximity to free margins a full thickness wedge repair was deemed most appropriate.  Using a sterile surgical marker, the appropriate repair was drawn incorporating the defect and placing the expected incisions perpendicular to the vermilion border.  The vermilion border was also meticulously outlined to ensure appropriate reapproximation during the repair.  The area thus outlined was incised through and through with a #15 scalpel blade.  The muscularis and dermis were reaproximated with deep sutures following hemostasis. Care was taken to realign the vermilion border before proceeding with the superficial closure.  Once the vermilion was realigned the superfical and mucosal closure was finished.
Retention Suture Bite Size: 1 mm
Consent (Ear)/Introductory Paragraph: The rationale for Mohs was explained to the patient and consent was obtained. The risks, benefits and alternatives to therapy were discussed in detail. Specifically, the risks of ear deformity, infection, scarring, bleeding, prolonged wound healing, incomplete removal, allergy to anesthesia, nerve injury and recurrence were addressed. Prior to the procedure, the treatment site was clearly identified and confirmed by the patient. All components of Universal Protocol/PAUSE Rule completed.
Advancement Flap (Single) Text: The defect edges were debeveled with a #15 scalpel blade.  Given the location of the defect and the proximity to free margins a single advancement flap was deemed most appropriate.  Using a sterile surgical marker, an appropriate advancement flap was drawn incorporating the defect and placing the expected incisions within the relaxed skin tension lines where possible.    The area thus outlined was incised deep to adipose tissue with a #15 scalpel blade.  The skin margins were undermined to an appropriate distance in all directions utilizing iris scissors.
Area H Indication Text: Tumors in this location are included in Area H (eyelids, eyebrows, nose, lips, chin, ear, pre-auricular, post-auricular, temple, genitalia, hands, feet, ankles and areola).  Tissue conservation is critical in these anatomic locations.
Alternatives Discussed Intro (Do Not Add Period): I discussed alternative treatments to Mohs surgery and specifically discussed the risks and benefits of
O-T Advancement Flap Text: The defect edges were debeveled with a #15 scalpel blade.  Given the location of the defect, shape of the defect and the proximity to free margins an O-T advancement flap was deemed most appropriate.  Using a sterile surgical marker, an appropriate advancement flap was drawn incorporating the defect and placing the expected incisions within the relaxed skin tension lines where possible.    The area thus outlined was incised deep to adipose tissue with a #15 scalpel blade.  The skin margins were undermined to an appropriate distance in all directions utilizing iris scissors.
Pain Refusal Text: I offered to prescribe pain medication but the patient refused to take this medication.
Dermal Autograft Text: The defect edges were debeveled with a #15 scalpel blade.  Given the location of the defect, shape of the defect and the proximity to free margins a dermal autograft was deemed most appropriate.  Using a sterile surgical marker, the primary defect shape was transferred to the donor site. The area thus outlined was incised deep to adipose tissue with a #15 scalpel blade.  The harvested graft was then trimmed of adipose and epidermal tissue until only dermis was left.  The skin graft was then placed in the primary defect and oriented appropriately.
Graft Cartilage Fenestration Text: The cartilage was fenestrated with a 2mm punch biopsy to help facilitate graft survival and healing.
Repair Anesthesia Method: local infiltration
Undermining Location (Optional): in the superficial subcutaneous fat
Anesthesia Type: 1% lidocaine with epinephrine and 0.25% bupivacaine in a 2:3 ration buffered with 8.4% sodium bicarbonate
Star Wedge Flap Text: The defect edges were debeveled with a #15 scalpel blade.  Given the location of the defect, shape of the defect and the proximity to free margins a star wedge flap was deemed most appropriate.  Using a sterile surgical marker, an appropriate rotation flap was drawn incorporating the defect and placing the expected incisions within the relaxed skin tension lines where possible. The area thus outlined was incised deep to adipose tissue with a #15 scalpel blade.  The skin margins were undermined to an appropriate distance in all directions utilizing iris scissors.
Suturegard Retention Suture: 0-0 Nylon
Surgeon Performing Repair (Optional): Angel Dailey MD
Retention Suture Text: Retention sutures were placed to support the closure and prevent dehiscence.
W Plasty Text: The lesion was extirpated to the level of the fat with a #15 scalpel blade.  Given the location of the defect, shape of the defect and the proximity to free margins a W-plasty was deemed most appropriate for repair.  Using a sterile surgical marker, the appropriate transposition arms of the W-plasty were drawn incorporating the defect and placing the expected incisions within the relaxed skin tension lines where possible.    The area thus outlined was incised deep to adipose tissue with a #15 scalpel blade.  The skin margins were undermined to an appropriate distance in all directions utilizing iris scissors.  The opposing transposition arms were then transposed into place in opposite direction and anchored with interrupted buried subcutaneous sutures.
Area L Indication Text: Tumors in this location are included in Area L (trunk and extremities).  Mohs surgery is indicated for larger tumors, or tumors with aggressive histologic features, in these anatomic locations.
Stage 1: Number Of Blocks?: 1
Posterior Auricular Interpolation Flap Text: A decision was made to reconstruct the defect utilizing an interpolation axial flap and a staged reconstruction.  A telfa template was made of the defect.  This telfa template was then used to outline the posterior auricular interpolation flap.  The donor area for the pedicle flap was then injected with anesthesia.  The flap was excised through the skin and subcutaneous tissue down to the layer of the underlying musculature.  The pedicle flap was carefully excised within this deep plane to maintain its blood supply.  The edges of the donor site were undermined.   The donor site was closed in a primary fashion.  The pedicle was then rotated into position and sutured.  Once the tube was sutured into place, adequate blood supply was confirmed with blanching and refill.  The pedicle was then wrapped with xeroform gauze and dressed appropriately with a telfa and gauze bandage to ensure continued blood supply and protect the attached pedicle.
Previous Accession (Optional): [id number]
Double O-Z Flap Text: The defect edges were debeveled with a #15 scalpel blade.  Given the location of the defect, shape of the defect and the proximity to free margins a Double O-Z flap was deemed most appropriate.  Using a sterile surgical marker, an appropriate transposition flap was drawn incorporating the defect and placing the expected incisions within the relaxed skin tension lines where possible. The area thus outlined was incised deep to adipose tissue with a #15 scalpel blade.  The skin margins were undermined to an appropriate distance in all directions utilizing iris scissors.
Unna Boot Text: An Unna boot was placed to help immobilize the limb and facilitate more rapid healing.
Repair Hemostasis (Optional): Pinpoint electrocautery
Epidermal Closure: running and interrupted
Mart-1 - Negative Histology Text: MART-1 staining demonstrates a normal density and pattern of melanocytes along the dermal-epidermal junction. The surgical margins are negative for tumor cells.
Graft Donor Site Epidermal Sutures (Optional): 5-0 Surgipro
Staging Info: By selecting yes to the question above you will include information on AJCC 8 tumor staging in your Mohs note. Information on tumor staging will be automatically added for SCCs on the head and neck. AJCC 8 includes tumor size, tumor depth, perineural involvement and bone invasion.
H Plasty Text: Given the location of the defect, shape of the defect and the proximity to free margins a H-plasty was deemed most appropriate for repair.  Using a sterile surgical marker, the appropriate advancement arms of the H-plasty were drawn incorporating the defect and placing the expected incisions within the relaxed skin tension lines where possible. The area thus outlined was incised deep to adipose tissue with a #15 scalpel blade. The skin margins were undermined to an appropriate distance in all directions utilizing iris scissors.  The opposing advancement arms were then advanced into place in opposite direction and anchored with interrupted buried subcutaneous sutures.
Consent (Temporal Branch)/Introductory Paragraph: The rationale for Mohs was explained to the patient and consent was obtained. The risks, benefits and alternatives to therapy were discussed in detail. Specifically, the risks of damage to the temporal branch of the facial nerve, infection, scarring, bleeding, prolonged wound healing, incomplete removal, allergy to anesthesia, and recurrence were addressed. Prior to the procedure, the treatment site was clearly identified and confirmed by the patient. All components of Universal Protocol/PAUSE Rule completed.
Cartilage Graft Text: The defect edges were debeveled with a #15 scalpel blade.  Given the location of the defect, shape of the defect, the fact the defect involved a full thickness cartilage defect a cartilage graft was deemed most appropriate.  An appropriate donor site was identified, cleansed, and anesthetized. The cartilage graft was then harvested and transferred to the recipient site, oriented appropriately and then sutured into place.  The secondary defect was then repaired using a primary closure.
Consent Type: Consent 1 (Standard)
Partial Purse String (Simple) Text: Given the location of the defect and the characteristics of the surrounding skin a simple purse string closure was deemed most appropriate.  Undermining was performed circumfirentially around the surgical defect.  A purse string suture was then placed and tightened. Wound tension only allowed a partial closure of the circular defect.
S Plasty Text: Given the location and shape of the defect, and the orientation of relaxed skin tension lines, an S-plasty was deemed most appropriate for repair.  Using a sterile surgical marker, the appropriate outline of the S-plasty was drawn, incorporating the defect and placing the expected incisions within the relaxed skin tension lines where possible.  The area thus outlined was incised deep to adipose tissue with a #15 scalpel blade.  The skin margins were undermined to an appropriate distance in all directions utilizing iris scissors. The skin flaps were advanced over the defect.  The opposing margins were then approximated with interrupted buried subcutaneous sutures.
Alar Island Pedicle Flap Text: The defect edges were debeveled with a #15 scalpel blade.  Given the location of the defect, shape of the defect and the proximity to the alar rim an island pedicle advancement flap was deemed most appropriate.  Using a sterile surgical marker, an appropriate advancement flap was drawn incorporating the defect, outlining the appropriate donor tissue and placing the expected incisions within the nasal ala running parallel to the alar rim. The area thus outlined was incised with a #15 scalpel blade.  The skin margins were undermined minimally to an appropriate distance in all directions around the primary defect and laterally outward around the island pedicle utilizing iris scissors.  There was minimal undermining beneath the pedicle flap.
Referring Physician (Optional): JERAD Landaverde
Double Island Pedicle Flap Text: The defect edges were debeveled with a #15 scalpel blade.  Given the location of the defect, shape of the defect and the proximity to free margins a double island pedicle advancement flap was deemed most appropriate.  Using a sterile surgical marker, an appropriate advancement flap was drawn incorporating the defect, outlining the appropriate donor tissue and placing the expected incisions within the relaxed skin tension lines where possible.    The area thus outlined was incised deep to adipose tissue with a #15 scalpel blade.  The skin margins were undermined to an appropriate distance in all directions around the primary defect and laterally outward around the island pedicle utilizing iris scissors.  There was minimal undermining beneath the pedicle flap.
Localized Dermabrasion With Wire Brush Text: The patient was draped in routine manner.  Localized dermabrasion using 3 x 17 mm wire brush was performed in routine manner to papillary dermis. This spot dermabrasion is being performed to complete skin cancer reconstruction. It also will eliminate the other sun damaged precancerous cells that are known to be part of the regional effect of a lifetime's worth of sun exposure. This localized dermabrasion is therapeutic and should not be considered cosmetic in any regard.
Melolabial Interpolation Flap Text: A decision was made to reconstruct the defect utilizing an interpolation axial flap and a staged reconstruction.  A telfa template was made of the defect.  This telfa template was then used to outline the melolabial interpolation flap.  The donor area for the pedicle flap was then injected with anesthesia.  The flap was excised through the skin and subcutaneous tissue down to the layer of the underlying musculature.  The pedicle flap was carefully excised within this deep plane to maintain its blood supply.  The edges of the donor site were undermined.   The donor site was closed in a primary fashion.  The pedicle was then rotated into position and sutured.  Once the tube was sutured into place, adequate blood supply was confirmed with blanching and refill.  The pedicle was then wrapped with xeroform gauze and dressed appropriately with a telfa and gauze bandage to ensure continued blood supply and protect the attached pedicle.
Plastic Surgeon Procedure Text (A): After obtaining clear surgical margins the patient was sent to plastics for surgical repair.  The patient understands they will receive post-surgical care and follow-up from the referring physician's office.
Cheiloplasty (Complex) Text: A decision was made to reconstruct the defect with a  cheiloplasty.  The defect was undermined extensively.  Additional obicularis oris muscle was excised with a 15 blade scalpel.  The defect was converted into a full thickness wedge to facilite a better cosmetic result.  Small vessels were then tied off with 5-0 monocyrl. The obicularis oris, superficial fascia, adipose and dermis were then reapproximated.  After the deeper layers were approximated the epidermis was reapproximated with particular care given to realign the vermilion border.
Z Plasty Text: The lesion was extirpated to the level of the fat with a #15 scalpel blade.  Given the location of the defect, shape of the defect and the proximity to free margins a Z-plasty was deemed most appropriate for repair.  Using a sterile surgical marker, the appropriate transposition arms of the Z-plasty were drawn incorporating the defect and placing the expected incisions within the relaxed skin tension lines where possible.    The area thus outlined was incised deep to adipose tissue with a #15 scalpel blade.  The skin margins were undermined to an appropriate distance in all directions utilizing iris scissors.  The opposing transposition arms were then transposed into place in opposite direction and anchored with interrupted buried subcutaneous sutures.
Nostril Rim Text: The closure involved the nostril rim.
Lazy S Intermediate Repair Preamble Text (Leave Blank If You Do Not Want): Undermining was performed with blunt dissection.
Rhombic Flap Text: The defect edges were debeveled with a #15 scalpel blade.  Given the location of the defect and the proximity to free margins a rhombic flap was deemed most appropriate.  Using a sterile surgical marker, an appropriate rhombic flap was drawn incorporating the defect.    The area thus outlined was incised deep to adipose tissue with a #15 scalpel blade.  The skin margins were undermined to an appropriate distance in all directions utilizing iris scissors.
Mohs Case Number: [id number]
Paramedian Forehead Flap Text: A decision was made to reconstruct the defect utilizing an interpolation axial flap and a staged reconstruction.  A telfa template was made of the defect.  This telfa template was then used to outline the paramedian forehead pedicle flap.  The donor area for the pedicle flap was then injected with anesthesia.  The flap was excised through the skin and subcutaneous tissue down to the layer of the underlying musculature.  The pedicle flap was carefully excised within this deep plane to maintain its blood supply.  The edges of the donor site were undermined.   The donor site was closed in a primary fashion.  The pedicle was then rotated into position and sutured.  Once the tube was sutured into place, adequate blood supply was confirmed with blanching and refill.  The pedicle was then wrapped with xeroform gauze and dressed appropriately with a telfa and gauze bandage to ensure continued blood supply and protect the attached pedicle.
Mercedes Flap Text: The defect edges were debeveled with a #15 scalpel blade.  Given the location of the defect, shape of the defect and the proximity to free margins a Mercedes flap was deemed most appropriate.  Using a sterile surgical marker, an appropriate advancement flap was drawn incorporating the defect and placing the expected incisions within the relaxed skin tension lines where possible. The area thus outlined was incised deep to adipose tissue with a #15 scalpel blade.  The skin margins were undermined to an appropriate distance in all directions utilizing iris scissors.
Debridement Text: The wound edges were debrided prior to proceeding with the closure to facilitate wound healing.
Mustarde Flap Text: The defect edges were debeveled with a #15 scalpel blade.  Given the size, depth and location of the defect and the proximity to free margins a Mustarde flap was deemed most appropriate.  Using a sterile surgical marker, an appropriate flap was drawn incorporating the defect. The area thus outlined was incised with a #15 scalpel blade.  The skin margins were undermined to an appropriate distance in all directions utilizing iris scissors.
Complex Repair And Graft Additional Text (Will Appearing After The Standard Complex Repair Text): The complex repair was not sufficient to completely close the primary defect. The remaining additional defect was repaired with the graft mentioned below.
V-Y Plasty Text: The defect edges were debeveled with a #15 scalpel blade.  Given the location of the defect, shape of the defect and the proximity to free margins an V-Y advancement flap was deemed most appropriate.  Using a sterile surgical marker, an appropriate advancement flap was drawn incorporating the defect and placing the expected incisions within the relaxed skin tension lines where possible.    The area thus outlined was incised deep to adipose tissue with a #15 scalpel blade.  The skin margins were undermined to an appropriate distance in all directions utilizing iris scissors.
Date Of Previous Biopsy (Optional): 8/10/22
Suturegard Intro: Intraoperative tissue expansion was performed, utilizing the SUTUREGARD device, in order to reduce wound tension.
Post-Care Instructions: I reviewed with the patient in detail post-care instructions. Patient is not to engage in any heavy lifting, exercise, or swimming for the next 14 days. Should the patient develop any fevers, chills, bleeding, severe pain patient will contact the office immediately.
Double O-Z Plasty Text: The defect edges were debeveled with a #15 scalpel blade.  Given the location of the defect, shape of the defect and the proximity to free margins a Double O-Z plasty (double transposition flap) was deemed most appropriate.  Using a sterile surgical marker, the appropriate transposition flaps were drawn incorporating the defect and placing the expected incisions within the relaxed skin tension lines where possible. The area thus outlined was incised deep to adipose tissue with a #15 scalpel blade.  The skin margins were undermined to an appropriate distance in all directions utilizing iris scissors.  Hemostasis was achieved with electrocautery.  The flaps were then transposed into place, one clockwise and the other counterclockwise, and anchored with interrupted buried subcutaneous sutures.
Orbicularis Oris Muscle Flap Text: The defect edges were debeveled with a #15 scalpel blade.  Given that the defect affected the competency of the oral sphincter an orbicularis oris muscle flap was deemed most appropriate to restore this competency and normal muscle function.  Using a sterile surgical marker, an appropriate flap was drawn incorporating the defect. The area thus outlined was incised with a #15 scalpel blade.
Mohs Method Verbiage: An incision at a 45 degree angle following the standard Mohs approach was done and the specimen was harvested as a microscopic controlled layer.
V-Y Flap Text: The defect edges were debeveled with a #15 scalpel blade.  Given the location of the defect, shape of the defect and the proximity to free margins a V-Y flap was deemed most appropriate.  Using a sterile surgical marker, an appropriate advancement flap was drawn incorporating the defect and placing the expected incisions within the relaxed skin tension lines where possible.    The area thus outlined was incised deep to adipose tissue with a #15 scalpel blade.  The skin margins were undermined to an appropriate distance in all directions utilizing iris scissors.
Helical Rim Text: The closure involved the helical rim.
Graft Donor Site Bandage (Optional-Leave Blank If You Don't Want In Note): Aquaplast was fitted to the graft site and sewn into place. A pressure bandage were applied to the donor site and over the aquaplast bolster.
Consent (Marginal Mandibular)/Introductory Paragraph: The rationale for Mohs was explained to the patient and consent was obtained. The risks, benefits and alternatives to therapy were discussed in detail. Specifically, the risks of damage to the marginal mandibular branch of the facial nerve, infection, scarring, bleeding, prolonged wound healing, incomplete removal, allergy to anesthesia, and recurrence were addressed. Prior to the procedure, the treatment site was clearly identified and confirmed by the patient. All components of Universal Protocol/PAUSE Rule completed.
Skin Substitute Text: The defect edges were debeveled with a #15 scalpel blade.  Given the location of the defect, shape of the defect and the proximity to free margins a skin substitute graft was deemed most appropriate.  The graft material was trimmed to fit the size of the defect. The graft was then placed in the primary defect and oriented appropriately.
Keystone Flap Text: The defect edges were debeveled with a #15 scalpel blade.  Given the location of the defect, shape of the defect a keystone flap was deemed most appropriate.  Using a sterile surgical marker, an appropriate keystone flap was drawn incorporating the defect, outlining the appropriate donor tissue and placing the expected incisions within the relaxed skin tension lines where possible. The area thus outlined was incised deep to adipose tissue with a #15 scalpel blade.  The skin margins were undermined to an appropriate distance in all directions around the primary defect and laterally outward around the flap utilizing iris scissors.
Secondary Intention Text (Leave Blank If You Do Not Want): The defect will heal with secondary intention.
Epidermal Autograft Text: The defect edges were debeveled with a #15 scalpel blade.  Given the location of the defect, shape of the defect and the proximity to free margins an epidermal autograft was deemed most appropriate.  Using a sterile surgical marker, the primary defect shape was transferred to the donor site. The epidermal graft was then harvested.  The skin graft was then placed in the primary defect and oriented appropriately.
Detail Level: Detailed
Staged Advancement Flap Text: The defect edges were debeveled with a #15 scalpel blade.  Given the location of the defect, shape of the defect and the proximity to free margins a staged advancement flap was deemed most appropriate.  Using a sterile surgical marker, an appropriate advancement flap was drawn incorporating the defect and placing the expected incisions within the relaxed skin tension lines where possible. The area thus outlined was incised deep to adipose tissue with a #15 scalpel blade.  The skin margins were undermined to an appropriate distance in all directions utilizing iris scissors.
Spiral Flap Text: The defect edges were debeveled with a #15 scalpel blade.  Given the location of the defect, shape of the defect and the proximity to free margins a spiral flap was deemed most appropriate.  Using a sterile surgical marker, an appropriate rotation flap was drawn incorporating the defect and placing the expected incisions within the relaxed skin tension lines where possible. The area thus outlined was incised deep to adipose tissue with a #15 scalpel blade.  The skin margins were undermined to an appropriate distance in all directions utilizing iris scissors.
Vermilion Border Text: The closure involved the vermilion border.
Bilateral Helical Rim Advancement Flap Text: The defect edges were debeveled with a #15 blade scalpel.  Given the location of the defect and the proximity to free margins (helical rim) a bilateral helical rim advancement flap was deemed most appropriate.  Using a sterile surgical marker, the appropriate advancement flaps were drawn incorporating the defect and placing the expected incisions between the helical rim and antihelix where possible.  The area thus outlined was incised through and through with a #15 scalpel blade.  With a skin hook and iris scissors, the flaps were gently and sharply undermined and freed up.
Width Of Defect Perpendicular To Closure In Cm (Required): 1.2
Surgeon/Pathologist Verbiage (Will Incorporate Name Of Surgeon From Intro If Not Blank): operated in two distinct and integrated capacities as the surgeon and pathologist.
O-Z Plasty Text: The defect edges were debeveled with a #15 scalpel blade.  Given the location of the defect, shape of the defect and the proximity to free margins an O-Z plasty (double transposition flap) was deemed most appropriate.  Using a sterile surgical marker, the appropriate transposition flaps were drawn incorporating the defect and placing the expected incisions within the relaxed skin tension lines where possible.    The area thus outlined was incised deep to adipose tissue with a #15 scalpel blade.  The skin margins were undermined to an appropriate distance in all directions utilizing iris scissors.  Hemostasis was achieved with electrocautery.  The flaps were then transposed into place, one clockwise and the other counterclockwise, and anchored with interrupted buried subcutaneous sutures.
Bcc Histology Text: There were numerous aggregates of basaloid cells.
Nasalis-Muscle-Based Myocutaneous Island Pedicle Flap Text: Using a #15 blade, an incision was made around the donor flap to the level of the nasalis muscle. Wide lateral undermining was then performed in both the subcutaneous plane above the nasalis muscle, and in a submuscular plane just above periosteum. This allowed the formation of a free nasalis muscle axial pedicle (based on the angular artery) which was still attached to the actual cutaneous flap, increasing its mobility and vascular viability. Hemostasis was obtained with pinpoint electrocoagulation. The flap was mobilized into position and the pivotal anchor points positioned and stabilized with buried interrupted sutures. Subcutaneous and dermal tissues were closed in a multilayered fashion with sutures. Tissue redundancies were excised, and the epidermal edges were apposed without significant tension and sutured with sutures.
Area M Indication Text: Tumors in this location are included in Area M (cheek, forehead, scalp, neck, jawline and pretibial skin).  Mohs surgery is indicated for tumors in these anatomic locations.
Trilobed Flap Text: The defect edges were debeveled with a #15 scalpel blade.  Given the location of the defect and the proximity to free margins a trilobed flap was deemed most appropriate.  Using a sterile surgical marker, an appropriate trilobed flap drawn around the defect.    The area thus outlined was incised deep to adipose tissue with a #15 scalpel blade.  The skin margins were undermined to an appropriate distance in all directions utilizing iris scissors.
Mart-1 - Positive Histology Text: MART-1 staining demonstrates areas of higher density and clustering of melanocytes with Pagetoid spread upwards within the epidermis. The surgical margins are positive for tumor cells.
Cheiloplasty (Less Than 50%) Text: A decision was made to reconstruct the defect with a  cheiloplasty.  The defect was undermined extensively.  Additional obicularis oris muscle was excised with a 15 blade scalpel.  The defect was converted into a full thickness wedge, of less than 50% of the vertical height of the lip, to facilite a better cosmetic result.  Small vessels were then tied off with 5-0 monocyrl. The obicularis oris, superficial fascia, adipose and dermis were then reapproximated.  After the deeper layers were approximated the epidermis was reapproximated with particular care given to realign the vermilion border.
Closure 4 Information: This tab is for additional flaps and grafts above and beyond our usual structured repairs.  Please note if you enter information here it will not currently bill and you will need to add the billing information manually.
Burow's Graft Text: The defect edges were debeveled with a #15 scalpel blade.  Given the location of the defect, shape of the defect, the proximity to free margins and the presence of a standing cone deformity a Burow's skin graft was deemed most appropriate. The standing cone was removed and this tissue was then trimmed to the shape of the primary defect. The adipose tissue was also removed until only dermis and epidermis were left.  The skin margins of the secondary defect were undermined to an appropriate distance in all directions utilizing iris scissors.  The secondary defect was closed with interrupted buried subcutaneous sutures.  The skin edges were then re-apposed with running  sutures.  The skin graft was then placed in the primary defect and oriented appropriately.
Ear Wedge Repair Text: A wedge excision was completed by carrying down an excision through the full thickness of the ear and cartilage with an inward facing Burow's triangle. The wound was then closed in a layered fashion.
Complex Repair And Flap Additional Text (Will Appearing After The Standard Complex Repair Text): The complex repair was not sufficient to completely close the primary defect. The remaining additional defect was repaired with the flap mentioned below.
Ftsg Text: The defect edges were debeveled with a #15 scalpel blade.  Given the location of the defect, shape of the defect and the proximity to free margins a full thickness skin graft was deemed most appropriate.  Using a sterile surgical marker, the primary defect shape was transferred to the donor site. The area thus outlined was incised deep to adipose tissue with a #15 scalpel blade.  The harvested graft was then trimmed of adipose tissue until only dermis and epidermis was left.  The skin margins of the secondary defect were undermined to an appropriate distance in all directions utilizing iris scissors.  The secondary defect was closed with interrupted buried subcutaneous sutures.  The skin edges were then re-apposed with running  sutures.  The skin graft was then placed in the primary defect and oriented appropriately.
O-L Flap Text: The defect edges were debeveled with a #15 scalpel blade.  Given the location of the defect, shape of the defect and the proximity to free margins an O-L flap was deemed most appropriate.  Using a sterile surgical marker, an appropriate advancement flap was drawn incorporating the defect and placing the expected incisions within the relaxed skin tension lines where possible.    The area thus outlined was incised deep to adipose tissue with a #15 scalpel blade.  The skin margins were undermined to an appropriate distance in all directions utilizing iris scissors.
Repair Type: Complex Repair
Interpolation Flap Text: A decision was made to reconstruct the defect utilizing an interpolation axial flap and a staged reconstruction.  A telfa template was made of the defect.  This telfa template was then used to outline the interpolation flap.  The donor area for the pedicle flap was then injected with anesthesia.  The flap was excised through the skin and subcutaneous tissue down to the layer of the underlying musculature.  The interpolation flap was carefully excised within this deep plane to maintain its blood supply.  The edges of the donor site were undermined.   The donor site was closed in a primary fashion.  The pedicle was then rotated into position and sutured.  Once the tube was sutured into place, adequate blood supply was confirmed with blanching and refill.  The pedicle was then wrapped with xeroform gauze and dressed appropriately with a telfa and gauze bandage to ensure continued blood supply and protect the attached pedicle.
Consent (Lip)/Introductory Paragraph: The rationale for Mohs was explained to the patient and consent was obtained. The risks, benefits and alternatives to therapy were discussed in detail. Specifically, the risks of lip deformity, changes in the oral aperture, infection, scarring, bleeding, prolonged wound healing, incomplete removal, allergy to anesthesia, nerve injury and recurrence were addressed. Prior to the procedure, the treatment site was clearly identified and confirmed by the patient. All components of Universal Protocol/PAUSE Rule completed.
Subsequent Stages Histo Method Verbiage: Using a similar technique to that described above, a thin layer of tissue was removed from all areas where tumor was visible on the previous stage.  The tissue was again oriented, mapped, dyed, and processed as above.
Postop Diagnosis: same
Closure 2 Information: This tab is for additional flaps and grafts, including complex repair and grafts and complex repair and flaps. You can also specify a different location for the additional defect, if the location is the same you do not need to select a new one. We will insert the automated text for the repair you select below just as we do for solitary flaps and grafts. Please note that at this time if you select a location with a different insurance zone you will need to override the ICD10 and CPT if appropriate.
Purse String (Intermediate) Text: Given the location of the defect and the characteristics of the surrounding skin a purse string intermediate closure was deemed most appropriate.  Undermining was performed circumfirentially around the surgical defect.  A purse string suture was then placed and tightened.
Non-Graft Cartilage Fenestration Text: The cartilage was fenestrated with a 2mm punch biopsy to help facilitate healing.
Island Pedicle Flap Text: The defect edges were debeveled with a #15 scalpel blade.  Given the location of the defect, shape of the defect and the proximity to free margins an island pedicle advancement flap was deemed most appropriate.  Using a sterile surgical marker, an appropriate advancement flap was drawn incorporating the defect, outlining the appropriate donor tissue and placing the expected incisions within the relaxed skin tension lines where possible.    The area thus outlined was incised deep to adipose tissue with a #15 scalpel blade.  The skin margins were undermined to an appropriate distance in all directions around the primary defect and laterally outward around the island pedicle utilizing iris scissors.  There was minimal undermining beneath the pedicle flap.
Mucosal Advancement Flap Text: Given the location of the defect, shape of the defect and the proximity to free margins a mucosal advancement flap was deemed most appropriate. Incisions were made with a 15 blade scalpel in the appropriate fashion along the cutaneous vermilion border and the mucosal lip. The remaining actinically damaged mucosal tissue was excised.  The mucosal advancement flap was then elevated to the gingival sulcus with care taken to preserve the neurovascular structures and advanced into the primary defect. Care was taken to ensure that precise realignment of the vermilion border was achieved.
Burow's Advancement Flap Text: The defect edges were debeveled with a #15 scalpel blade.  Given the location of the defect and the proximity to free margins a Burow's advancement flap was deemed most appropriate.  Using a sterile surgical marker, the appropriate advancement flap was drawn incorporating the defect and placing the expected incisions within the relaxed skin tension lines where possible.    The area thus outlined was incised deep to adipose tissue with a #15 scalpel blade.  The skin margins were undermined to an appropriate distance in all directions utilizing iris scissors.
Muscle Hinge Flap Text: The defect edges were debeveled with a #15 scalpel blade.  Given the size, depth and location of the defect and the proximity to free margins a muscle hinge flap was deemed most appropriate.  Using a sterile surgical marker, an appropriate hinge flap was drawn incorporating the defect. The area thus outlined was incised with a #15 scalpel blade.  The skin margins were undermined to an appropriate distance in all directions utilizing iris scissors.
Cheek-To-Nose Interpolation Flap Text: A decision was made to reconstruct the defect utilizing an interpolation axial flap and a staged reconstruction.  A telfa template was made of the defect.  This telfa template was then used to outline the Cheek-To-Nose Interpolation flap.  The donor area for the pedicle flap was then injected with anesthesia.  The flap was excised through the skin and subcutaneous tissue down to the layer of the underlying musculature.  The interpolation flap was carefully excised within this deep plane to maintain its blood supply.  The edges of the donor site were undermined.   The donor site was closed in a primary fashion.  The pedicle was then rotated into position and sutured.  Once the tube was sutured into place, adequate blood supply was confirmed with blanching and refill.  The pedicle was then wrapped with xeroform gauze and dressed appropriately with a telfa and gauze bandage to ensure continued blood supply and protect the attached pedicle.
Adjacent Tissue Transfer Text: The defect edges were debeveled with a #15 scalpel blade.  Given the location of the defect and the proximity to free margins an adjacent tissue transfer was deemed most appropriate.  Using a sterile surgical marker, an appropriate flap was drawn incorporating the defect and placing the expected incisions within the relaxed skin tension lines where possible.    The area thus outlined was incised deep to adipose tissue with a #15 scalpel blade.  The skin margins were undermined to an appropriate distance in all directions utilizing iris scissors.
Hatchet Flap Text: The defect edges were debeveled with a #15 scalpel blade.  Given the location of the defect, shape of the defect and the proximity to free margins a hatchet flap was deemed most appropriate.  Using a sterile surgical marker, an appropriate hatchet flap was drawn incorporating the defect and placing the expected incisions within the relaxed skin tension lines where possible.    The area thus outlined was incised deep to adipose tissue with a #15 scalpel blade.  The skin margins were undermined to an appropriate distance in all directions utilizing iris scissors.
Graft Donor Site Dermal Sutures (Optional): 5-0 Polysorb
Epidermal Sutures: 3-0 Surgipro
Mauc Instructions: By selecting yes to the question below the MAUC number will be added into the note.  This will be calculated automatically based on the diagnosis chosen, the size entered, the body zone selected (H,M,L) and the specific indications you chose. You will also have the option to override the Mohs AUC if you disagree with the automatically calculated number and this option is found in the Case Summary tab.
Tissue Cultured Epidermal Autograft Text: The defect edges were debeveled with a #15 scalpel blade.  Given the location of the defect, shape of the defect and the proximity to free margins a tissue cultured epidermal autograft was deemed most appropriate.  The graft was then trimmed to fit the size of the defect.  The graft was then placed in the primary defect and oriented appropriately.
Mastoid Interpolation Flap Text: A decision was made to reconstruct the defect utilizing an interpolation axial flap and a staged reconstruction.  A telfa template was made of the defect.  This telfa template was then used to outline the mastoid interpolation flap.  The donor area for the pedicle flap was then injected with anesthesia.  The flap was excised through the skin and subcutaneous tissue down to the layer of the underlying musculature.  The pedicle flap was carefully excised within this deep plane to maintain its blood supply.  The edges of the donor site were undermined.   The donor site was closed in a primary fashion.  The pedicle was then rotated into position and sutured.  Once the tube was sutured into place, adequate blood supply was confirmed with blanching and refill.  The pedicle was then wrapped with xeroform gauze and dressed appropriately with a telfa and gauze bandage to ensure continued blood supply and protect the attached pedicle.
Same Histology In Subsequent Stages Text: The pattern and morphology of the tumor is as described in the first stage.
Primary Defect Length In Cm (Final Defect Size - Required For Flaps/Grafts): 1.4
Hemigard Intro: Due to skin fragility and wound tension, it was decided to use HEMIGARD adhesive retention suture devices to permit a linear closure. The skin was cleaned and dried for a 6cm distance away from the wound. Excessive hair, if present, was removed to allow for adhesion.
Rotation Flap Text: The defect edges were debeveled with a #15 scalpel blade.  Given the location of the defect, shape of the defect and the proximity to free margins a rotation flap was deemed most appropriate.  Using a sterile surgical marker, an appropriate rotation flap was drawn incorporating the defect and placing the expected incisions within the relaxed skin tension lines where possible.    The area thus outlined was incised deep to adipose tissue with a #15 scalpel blade.  The skin margins were undermined to an appropriate distance in all directions utilizing iris scissors.
Wound Care (No Sutures): Petrolatum
Hemostasis: Electrocautery
Home Suture Removal Text: Patient was provided instructions on removing sutures and will remove their sutures at home.  If they have any questions or difficulties they will call the office.
Eye Protection Verbiage: Before proceeding with the stage, a plastic scleral shield was inserted. The globe was anesthetized with a few drops of 1% lidocaine with 1:100,000 epinephrine. Then, an appropriate sized scleral shield was chosen and coated with lacrilube ointment. The shield was gently inserted and left in place for the duration of each stage. After the stage was completed, the shield was gently removed.
Ear Star Wedge Flap Text: The defect edges were debeveled with a #15 blade scalpel.  Given the location of the defect and the proximity to free margins (helical rim) an ear star wedge flap was deemed most appropriate.  Using a sterile surgical marker, the appropriate flap was drawn incorporating the defect and placing the expected incisions between the helical rim and antihelix where possible.  The area thus outlined was incised through and through with a #15 scalpel blade.
Purse String (Simple) Text: Given the location of the defect and the characteristics of the surrounding skin a purse string closure was deemed most appropriate.  Undermining was performed circumfirentially around the surgical defect.  A purse string suture was then placed and tightened.
Hemigard Postcare Instructions: The HEMIGARD strips are to remain completely dry for at least 5-7 days.
Undermining Type: Entire Wound
Composite Graft Text: The defect edges were debeveled with a #15 scalpel blade.  Given the location of the defect, shape of the defect, the proximity to free margins and the fact the defect was full thickness a composite graft was deemed most appropriate.  The defect was outline and then transferred to the donor site.  A full thickness graft was then excised from the donor site. The graft was then placed in the primary defect, oriented appropriately and then sutured into place.  The secondary defect was then repaired using a primary closure.
Consent (Near Eyelid Margin)/Introductory Paragraph: The rationale for Mohs was explained to the patient and consent was obtained. The risks, benefits and alternatives to therapy were discussed in detail. Specifically, the risks of ectropion or eyelid deformity, infection, scarring, bleeding, prolonged wound healing, incomplete removal, allergy to anesthesia, nerve injury and recurrence were addressed. Prior to the procedure, the treatment site was clearly identified and confirmed by the patient. All components of Universal Protocol/PAUSE Rule completed.
Bcc Infiltrative Histology Text: There were numerous aggregates of basaloid cells demonstrating an infiltrative pattern.
Advancement-Rotation Flap Text: The defect edges were debeveled with a #15 scalpel blade.  Given the location of the defect, shape of the defect and the proximity to free margins an advancement-rotation flap was deemed most appropriate.  Using a sterile surgical marker, an appropriate flap was drawn incorporating the defect and placing the expected incisions within the relaxed skin tension lines where possible. The area thus outlined was incised deep to adipose tissue with a #15 scalpel blade.  The skin margins were undermined to an appropriate distance in all directions utilizing iris scissors.
Island Pedicle Flap With Canthal Suspension Text: The defect edges were debeveled with a #15 scalpel blade.  Given the location of the defect, shape of the defect and the proximity to free margins an island pedicle advancement flap was deemed most appropriate.  Using a sterile surgical marker, an appropriate advancement flap was drawn incorporating the defect, outlining the appropriate donor tissue and placing the expected incisions within the relaxed skin tension lines where possible. The area thus outlined was incised deep to adipose tissue with a #15 scalpel blade.  The skin margins were undermined to an appropriate distance in all directions around the primary defect and laterally outward around the island pedicle utilizing iris scissors.  There was minimal undermining beneath the pedicle flap. A suspension suture was placed in the canthal tendon to prevent tension and prevent ectropion.
Advancement Flap (Double) Text: The defect edges were debeveled with a #15 scalpel blade.  Given the location of the defect and the proximity to free margins a double advancement flap was deemed most appropriate.  Using a sterile surgical marker, the appropriate advancement flaps were drawn incorporating the defect and placing the expected incisions within the relaxed skin tension lines where possible.    The area thus outlined was incised deep to adipose tissue with a #15 scalpel blade.  The skin margins were undermined to an appropriate distance in all directions utilizing iris scissors.
Bi-Rhombic Flap Text: The defect edges were debeveled with a #15 scalpel blade.  Given the location of the defect and the proximity to free margins a bi-rhombic flap was deemed most appropriate.  Using a sterile surgical marker, an appropriate rhombic flap was drawn incorporating the defect. The area thus outlined was incised deep to adipose tissue with a #15 scalpel blade.  The skin margins were undermined to an appropriate distance in all directions utilizing iris scissors.
Location Indication Override (Is Already Calculated Based On Selected Body Location): Area H
Suturegard Body: The suture ends were repeatedly re-tightened and re-clamped to achieve the desired tissue expansion.
Medical Necessity Statement: Based on my medical judgement, Mohs surgery is the most appropriate treatment for this cancer compared to other treatments.
No Residual Tumor Seen Histology Text: There were no malignant cells seen in the sections examined.
Modified Advancement Flap Text: The defect edges were debeveled with a #15 scalpel blade.  Given the location of the defect, shape of the defect and the proximity to free margins a modified advancement flap was deemed most appropriate.  Using a sterile surgical marker, an appropriate advancement flap was drawn incorporating the defect and placing the expected incisions within the relaxed skin tension lines where possible.    The area thus outlined was incised deep to adipose tissue with a #15 scalpel blade.  The skin margins were undermined to an appropriate distance in all directions utilizing iris scissors.
O-T Plasty Text: The defect edges were debeveled with a #15 scalpel blade.  Given the location of the defect, shape of the defect and the proximity to free margins an O-T plasty was deemed most appropriate.  Using a sterile surgical marker, an appropriate O-T plasty was drawn incorporating the defect and placing the expected incisions within the relaxed skin tension lines where possible.    The area thus outlined was incised deep to adipose tissue with a #15 scalpel blade.  The skin margins were undermined to an appropriate distance in all directions utilizing iris scissors.
Partial Purse String (Intermediate) Text: Given the location of the defect and the characteristics of the surrounding skin an intermediate purse string closure was deemed most appropriate.  Undermining was performed circumfirentially around the surgical defect.  A purse string suture was then placed and tightened. Wound tension only allowed a partial closure of the circular defect.
Consent 1/Introductory Paragraph: The rationale for Mohs was explained to the patient and consent was obtained. The risks, benefits and alternatives to therapy were discussed in detail. Specifically, the risks of infection, scarring, bleeding, prolonged wound healing, incomplete removal, allergy to anesthesia, nerve injury and recurrence were addressed. Prior to the procedure, the treatment site was clearly identified and confirmed by the patient. All components of Universal Protocol/PAUSE Rule completed.
Tumor Depth: Less than 6mm from granular layer and no invasion beyond the subcutaneous fat
Bilobed Flap Text: The defect edges were debeveled with a #15 scalpel blade.  Given the location of the defect and the proximity to free margins a bilobe flap was deemed most appropriate.  Using a sterile surgical marker, an appropriate bilobe flap drawn around the defect.    The area thus outlined was incised deep to adipose tissue with a #15 scalpel blade.  The skin margins were undermined to an appropriate distance in all directions utilizing iris scissors.
Estimated Blood Loss (Cc): minimal
Manual Repair Warning Statement: We plan on removing the manually selected variable below in favor of our much easier automatic structured text blocks found in the previous tab. We decided to do this to help make the flow better and give you the full power of structured data. Manual selection is never going to be ideal in our platform and I would encourage you to avoid using manual selection from this point on, especially since I will be sunsetting this feature. It is important that you do one of two things with the customized text below. First, you can save all of the text in a word file so you can have it for future reference. Second, transfer the text to the appropriate area in the Library tab. Lastly, if there is a flap or graft type which we do not have you need to let us know right away so I can add it in before the variable is hidden. No need to panic, we plan to give you roughly 6 months to make the change.
Mohs Rapid Report Verbiage: The area of clinically evident tumor was marked with skin marking ink and appropriately hatched.  The initial incision was made following the Mohs approach through the skin.  The specimen was taken to the lab, divided into the necessary number of pieces, chromacoded and processed according to the Mohs protocol.  This was repeated in successive stages until a tumor free defect was achieved.
Consent (Scalp)/Introductory Paragraph: The rationale for Mohs was explained to the patient and consent was obtained. The risks, benefits and alternatives to therapy were discussed in detail. Specifically, the risks of changes in hair growth pattern secondary to repair, infection, scarring, bleeding, prolonged wound healing, incomplete removal, allergy to anesthesia, nerve injury and recurrence were addressed. Prior to the procedure, the treatment site was clearly identified and confirmed by the patient. All components of Universal Protocol/PAUSE Rule completed.
Rhomboid Transposition Flap Text: The defect edges were debeveled with a #15 scalpel blade.  Given the location of the defect and the proximity to free margins a rhomboid transposition flap was deemed most appropriate.  Using a sterile surgical marker, an appropriate rhomboid flap was drawn incorporating the defect.    The area thus outlined was incised deep to adipose tissue with a #15 scalpel blade.  The skin margins were undermined to an appropriate distance in all directions utilizing iris scissors.
A-T Advancement Flap Text: The defect edges were debeveled with a #15 scalpel blade.  Given the location of the defect, shape of the defect and the proximity to free margins an A-T advancement flap was deemed most appropriate.  Using a sterile surgical marker, an appropriate advancement flap was drawn incorporating the defect and placing the expected incisions within the relaxed skin tension lines where possible.    The area thus outlined was incised deep to adipose tissue with a #15 scalpel blade.  The skin margins were undermined to an appropriate distance in all directions utilizing iris scissors.
Wound Care: Vaseline
Information: Selecting Yes will display possible errors in your note based on the variables you have selected. This validation is only offered as a suggestion for you. PLEASE NOTE THAT THE VALIDATION TEXT WILL BE REMOVED WHEN YOU FINALIZE YOUR NOTE. IF YOU WANT TO FAX A PRELIMINARY NOTE YOU WILL NEED TO TOGGLE THIS TO 'NO' IF YOU DO NOT WANT IT IN YOUR FAXED NOTE.
Suture Removal: 28 days
Peng Advancement Flap Text: The defect edges were debeveled with a #15 scalpel blade.  Given the location of the defect, shape of the defect and the proximity to free margins a Peng advancement flap was deemed most appropriate.  Using a sterile surgical marker, an appropriate advancement flap was drawn incorporating the defect and placing the expected incisions within the relaxed skin tension lines where possible. The area thus outlined was incised deep to adipose tissue with a #15 scalpel blade.  The skin margins were undermined to an appropriate distance in all directions utilizing iris scissors.
Melolabial Transposition Flap Text: The defect edges were debeveled with a #15 scalpel blade.  Given the location of the defect and the proximity to free margins a melolabial flap was deemed most appropriate.  Using a sterile surgical marker, an appropriate melolabial transposition flap was drawn incorporating the defect.    The area thus outlined was incised deep to adipose tissue with a #15 scalpel blade.  The skin margins were undermined to an appropriate distance in all directions utilizing iris scissors.
Transposition Flap Text: The defect edges were debeveled with a #15 scalpel blade.  Given the location of the defect and the proximity to free margins a transposition flap was deemed most appropriate.  Using a sterile surgical marker, an appropriate transposition flap was drawn incorporating the defect.    The area thus outlined was incised deep to adipose tissue with a #15 scalpel blade.  The skin margins were undermined to an appropriate distance in all directions utilizing iris scissors.
Inflammation Suggestive Of Cancer Camouflage Histology Text: There was a dense lymphocytic infiltrate which prevented adequate histologic evaluation of adjacent structures.
Nasal Turnover Hinge Flap Text: The defect edges were debeveled with a #15 scalpel blade.  Given the size, depth, location of the defect and the defect being full thickness a nasal turnover hinge flap was deemed most appropriate.  Using a sterile surgical marker, an appropriate hinge flap was drawn incorporating the defect. The area thus outlined was incised with a #15 scalpel blade. The flap was designed to recreate the nasal mucosal lining and the alar rim. The skin margins were undermined to an appropriate distance in all directions utilizing iris scissors.
Where Do You Want The Question To Include Opioid Counseling Located?: Case Summary Tab
Helical Rim Advancement Flap Text: The defect edges were debeveled with a #15 blade scalpel.  Given the location of the defect and the proximity to free margins (helical rim) a double helical rim advancement flap was deemed most appropriate.  Using a sterile surgical marker, the appropriate advancement flaps were drawn incorporating the defect and placing the expected incisions between the helical rim and antihelix where possible.  The area thus outlined was incised through and through with a #15 scalpel blade.  With a skin hook and iris scissors, the flaps were gently and sharply undermined and freed up.
Consent (Spinal Accessory)/Introductory Paragraph: The rationale for Mohs was explained to the patient and consent was obtained. The risks, benefits and alternatives to therapy were discussed in detail. Specifically, the risks of damage to the spinal accessory nerve, infection, scarring, bleeding, prolonged wound healing, incomplete removal, allergy to anesthesia, and recurrence were addressed. Prior to the procedure, the treatment site was clearly identified and confirmed by the patient. All components of Universal Protocol/PAUSE Rule completed.
Bilobed Transposition Flap Text: The defect edges were debeveled with a #15 scalpel blade.  Given the location of the defect and the proximity to free margins a bilobed transposition flap was deemed most appropriate.  Using a sterile surgical marker, an appropriate bilobe flap drawn around the defect.    The area thus outlined was incised deep to adipose tissue with a #15 scalpel blade.  The skin margins were undermined to an appropriate distance in all directions utilizing iris scissors.
Abbe Flap (Upper To Lower Lip) Text: The defect of the lower lip was assessed and measured.  Given the location and size of the defect, an Abbe flap was deemed most appropriate.  Using a sterile surgical marker, an appropriate Abbe flap was measured and drawn on the upper lip. Local anesthesia was then infiltrated.  A scalpel was then used to incise the upper lip through and through the skin, vermilion, muscle and mucosa, leaving the flap pedicled on the opposite side.  The flap was then rotated and transferred to the lower lip defect.  The flap was then sutured into place with a three layer technique, closing the orbicularis oris muscle layer with subcutaneous buried sutures, followed by a mucosal layer and an epidermal layer.
Abbe Flap (Lower To Upper Lip) Text: The defect of the upper lip was assessed and measured.  Given the location and size of the defect, an Abbe flap was deemed most appropriate.  Using a sterile surgical marker, an appropriate Abbe flap was measured and drawn on the lower lip. Local anesthesia was then infiltrated. A scalpel was then used to incise the upper lip through and through the skin, vermilion, muscle and mucosa, leaving the flap pedicled on the opposite side.  The flap was then rotated and transferred to the lower lip defect.  The flap was then sutured into place with a three layer technique, closing the orbicularis oris muscle layer with subcutaneous buried sutures, followed by a mucosal layer and an epidermal layer.
Estlander Flap (Upper To Lower Lip) Text: The defect of the lower lip was assessed and measured.  Given the location and size of the defect, an Estlander flap was deemed most appropriate.  Using a sterile surgical marker, an appropriate Estlander flap was measured and drawn on the upper lip. Local anesthesia was then infiltrated. A scalpel was then used to incise the lateral aspect of the flap, through skin, muscle and mucosa, leaving the flap pedicled medially.  The flap was then rotated and positioned to fill the lower lip defect.  The flap was then sutured into place with a three layer technique, closing the orbicularis oris muscle layer with subcutaneous buried sutures, followed by a mucosal layer and an epidermal [email or web address]

## 2022-08-18 NOTE — PROCEDURE: MEDICATION COUNSELING
Include Pregnancy/Lactation Warning?: No
Azathioprine Pregnancy And Lactation Text: This medication is Pregnancy Category D and isn't considered safe during pregnancy. It is unknown if this medication is excreted in breast milk.
Wartpeel Counseling:  I discussed with the patient the risks of Wartpeel including but not limited to erythema, scaling, itching, weeping, crusting, and pain.
Spironolactone Pregnancy And Lactation Text: This medication can cause feminization of the male fetus and should be avoided during pregnancy. The active metabolite is also found in breast milk.
High Dose Vitamin A Pregnancy And Lactation Text: High dose vitamin A therapy is contraindicated during pregnancy and breast feeding.
Rituxan Pregnancy And Lactation Text: This medication is Pregnancy Category C and it isn't know if it is safe during pregnancy. It is unknown if this medication is excreted in breast milk but similar antibodies are known to be excreted.
Elidel Pregnancy And Lactation Text: This medication is Pregnancy Category C. It is unknown if this medication is excreted in breast milk.
Taltz Counseling: I discussed with the patient the risks of ixekizumab including but not limited to immunosuppression, serious infections, worsening of inflammatory bowel disease and drug reactions.  The patient understands that monitoring is required including a PPD at baseline and must alert us or the primary physician if symptoms of infection or other concerning signs are noted.
Arava Counseling:  Patient counseled regarding adverse effects of Arava including but not limited to nausea, vomiting, abnormalities in liver function tests. Patients may develop mouth sores, rash, diarrhea, and abnormalities in blood counts. The patient understands that monitoring is required including LFTs and blood counts.  There is a rare possibility of scarring of the liver and lung problems that can occur when taking methotrexate. Persistent nausea, loss of appetite, pale stools, dark urine, cough, and shortness of breath should be reported immediately. Patient advised to discontinue Arava treatment and consult with a physician prior to attempting conception. The patient will have to undergo a treatment to eliminate Arava from the body prior to conception.
Otezla Counseling: The side effects of Otezla were discussed with the patient, including but not limited to worsening or new depression, weight loss, diarrhea, nausea, upper respiratory tract infection, and headache. Patient instructed to call the office should any adverse effect occur.  The patient verbalized understanding of the proper use and possible adverse effects of Otezla.  All the patient's questions and concerns were addressed.
Hydroxyzine Pregnancy And Lactation Text: This medication is not safe during pregnancy and should not be taken. It is also excreted in breast milk and breast feeding isn't recommended.
Cibinqo Counseling: I discussed with the patient the risks of Cibinqo therapy including but not limited to common cold, nausea, headache, cold sores, increased blood CPK levels, dizziness, UTIs, fatigue, acne, and vomitting. Live vaccines should be avoided.  This medication has been linked to serious infections; higher rate of mortality; malignancy and lymphoproliferative disorders; major adverse cardiovascular events; thrombosis; thrombocytopenia and lymphopenia; lipid elevations; and retinal detachment.
Dutasteride Pregnancy And Lactation Text: This medication is absolutely contraindicated in women, especially during pregnancy and breast feeding. Feminization of male fetuses is possible if taking while pregnant.
Protopic Counseling: Patient may experience a mild burning sensation during topical application. Protopic is not approved in children less than 2 years of age. There have been case reports of hematologic and skin malignancies in patients using topical calcineurin inhibitors although causality is questionable.
Cibinqo Pregnancy And Lactation Text: It is unknown if this medication will adversely affect pregnancy or breast feeding.  You should not take this medication if you are currently pregnant or planning a pregnancy or while breastfeeding.
Erivedge Counseling- I discussed with the patient the risks of Erivedge including but not limited to nausea, vomiting, diarrhea, constipation, weight loss, changes in the sense of taste, decreased appetite, muscle spasms, and hair loss.  The patient verbalized understanding of the proper use and possible adverse effects of Erivedge.  All of the patient's questions and concerns were addressed.
Eucrisa Counseling: Patient may experience a mild burning sensation during topical application. Eucrisa is not approved in children less than 2 years of age.
Ketoconazole Pregnancy And Lactation Text: This medication is Pregnancy Category C and it isn't know if it is safe during pregnancy. It is also excreted in breast milk and breast feeding isn't recommended.
Rinvoq Pregnancy And Lactation Text: Based on animal studies, Rinvoq may cause embryo-fetal harm when administered to pregnant women.  The medication should not be used in pregnancy.  Breastfeeding is not recommended during treatment and for 6 days after the last dose.
Otezla Pregnancy And Lactation Text: This medication is Pregnancy Category C and it isn't known if it is safe during pregnancy. It is unknown if it is excreted in breast milk.
Humira Counseling:  I discussed with the patient the risks of adalimumab including but not limited to myelosuppression, immunosuppression, autoimmune hepatitis, demyelinating diseases, lymphoma, and serious infections.  The patient understands that monitoring is required including a PPD at baseline and must alert us or the primary physician if symptoms of infection or other concerning signs are noted.
Metronidazole Pregnancy And Lactation Text: This medication is Pregnancy Category B and considered safe during pregnancy.  It is also excreted in breast milk.
Calcipotriene Counseling:  I discussed with the patient the risks of calcipotriene including but not limited to erythema, scaling, itching, and irritation.
Hydroxychloroquine Pregnancy And Lactation Text: This medication has been shown to cause fetal harm but it isn't assigned a Pregnancy Risk Category. There are small amounts excreted in breast milk.
Tetracycline Counseling: Patient counseled regarding possible photosensitivity and increased risk for sunburn.  Patient instructed to avoid sunlight, if possible.  When exposed to sunlight, patients should wear protective clothing, sunglasses, and sunscreen.  The patient was instructed to call the office immediately if the following severe adverse effects occur:  hearing changes, easy bruising/bleeding, severe headache, or vision changes.  The patient verbalized understanding of the proper use and possible adverse effects of tetracycline.  All of the patient's questions and concerns were addressed. Patient understands to avoid pregnancy while on therapy due to potential birth defects.
Wartpeel Pregnancy And Lactation Text: This medication is Pregnancy Category X and contraindicated in pregnancy and in women who may become pregnant. It is unknown if this medication is excreted in breast milk.
Libtayo Counseling- I discussed with the patient the risks of Libtayo including but not limited to nausea, vomiting, diarrhea, and bone or muscle pain.  The patient verbalized understanding of the proper use and possible adverse effects of Libtayo.  All of the patient's questions and concerns were addressed.
Albendazole Counseling:  I discussed with the patient the risks of albendazole including but not limited to cytopenia, kidney damage, nausea/vomiting and severe allergy.  The patient understands that this medication is being used in an off-label manner.
Siliq Counseling:  I discussed with the patient the risks of Siliq including but not limited to new or worsening depression, suicidal thoughts and behavior, immunosuppression, malignancy, posterior leukoencephalopathy syndrome, and serious infections.  The patient understands that monitoring is required including a PPD at baseline and must alert us or the primary physician if symptoms of infection or other concerning signs are noted. There is also a special program designed to monitor depression which is required with Siliq.
Aklief counseling:  Patient advised to apply a pea-sized amount only at bedtime and wait 30 minutes after washing their face before applying.  If too drying, patient may add a non-comedogenic moisturizer.  The most commonly reported side effects including irritation, redness, scaling, dryness, stinging, burning, itching, and increased risk of sunburn.  The patient verbalized understanding of the proper use and possible adverse effects of retinoids.  All of the patient's questions and concerns were addressed.
Calcipotriene Pregnancy And Lactation Text: This medication has not been proven safe during pregnancy. It is unknown if this medication is excreted in breast milk.
Tetracycline Pregnancy And Lactation Text: This medication is Pregnancy Category D and not consider safe during pregnancy. It is also excreted in breast milk.
Taltz Pregnancy And Lactation Text: The risk during pregnancy and breastfeeding is uncertain with this medication.
Prednisone Pregnancy And Lactation Text: This medication is Pregnancy Category C and it isn't know if it is safe during pregnancy. This medication is excreted in breast milk.
Klisyri Pregnancy And Lactation Text: It is unknown if this medication can harm a developing fetus or if it is excreted in breast milk.
Arava Pregnancy And Lactation Text: This medication is Pregnancy Category X and is absolutely contraindicated during pregnancy. It is unknown if it is excreted in breast milk.
Cellcept Counseling:  I discussed with the patient the risks of mycophenolate mofetil including but not limited to infection/immunosuppression, GI upset, hypokalemia, hypercholesterolemia, bone marrow suppression, lymphoproliferative disorders, malignancy, GI ulceration/bleed/perforation, colitis, interstitial lung disease, kidney failure, progressive multifocal leukoencephalopathy, and birth defects.  The patient understands that monitoring is required including a baseline creatinine and regular CBC testing. In addition, patient must alert us immediately if symptoms of infection or other concerning signs are noted.
SSKI Counseling:  I discussed with the patient the risks of SSKI including but not limited to thyroid abnormalities, metallic taste, GI upset, fever, headache, acne, arthralgias, paraesthesias, lymphadenopathy, easy bleeding, arrhythmias, and allergic reaction.
Cephalexin Pregnancy And Lactation Text: This medication is Pregnancy Category B and considered safe during pregnancy.  It is also excreted in breast milk but can be used safely for shorter doses.
Clofazimine Counseling:  I discussed with the patient the risks of clofazimine including but not limited to skin and eye pigmentation, liver damage, nausea/vomiting, gastrointestinal bleeding and allergy.
Tremfya Counseling: I discussed with the patient the risks of guselkumab including but not limited to immunosuppression, serious infections, and drug reactions.  The patient understands that monitoring is required including a PPD at baseline and must alert us or the primary physician if symptoms of infection or other concerning signs are noted.
Sski Pregnancy And Lactation Text: This medication is Pregnancy Category D and isn't considered safe during pregnancy. It is excreted in breast milk.
Terbinafine Counseling: Patient counseling regarding adverse effects of terbinafine including but not limited to headache, diarrhea, rash, upset stomach, liver function test abnormalities, itching, taste/smell disturbance, nausea, abdominal pain, and flatulence.  There is a rare possibility of liver failure that can occur when taking terbinafine.  The patient understands that a baseline LFT and kidney function test may be required. The patient verbalized understanding of the proper use and possible adverse effects of terbinafine.  All of the patient's questions and concerns were addressed.
Cimzia Counseling:  I discussed with the patient the risks of Cimzia including but not limited to immunosuppression, allergic reactions and infections.  The patient understands that monitoring is required including a PPD at baseline and must alert us or the primary physician if symptoms of infection or other concerning signs are noted.
Protopic Pregnancy And Lactation Text: This medication is Pregnancy Category C. It is unknown if this medication is excreted in breast milk when applied topically.
Minocycline Counseling: Patient advised regarding possible photosensitivity and discoloration of the teeth, skin, lips, tongue and gums.  Patient instructed to avoid sunlight, if possible.  When exposed to sunlight, patients should wear protective clothing, sunglasses, and sunscreen.  The patient was instructed to call the office immediately if the following severe adverse effects occur:  hearing changes, easy bruising/bleeding, severe headache, or vision changes.  The patient verbalized understanding of the proper use and possible adverse effects of minocycline.  All of the patient's questions and concerns were addressed.
Oxybutynin Counseling:  I discussed with the patient the risks of oxybutynin including but not limited to skin rash, drowsiness, dry mouth, difficulty urinating, and blurred vision.
Tazorac Counseling:  Patient advised that medication is irritating and drying.  Patient may need to apply sparingly and wash off after an hour before eventually leaving it on overnight.  The patient verbalized understanding of the proper use and possible adverse effects of tazorac.  All of the patient's questions and concerns were addressed.
Humira Pregnancy And Lactation Text: This medication is Pregnancy Category B and is considered safe during pregnancy. It is unknown if this medication is excreted in breast milk.
Tazorac Pregnancy And Lactation Text: This medication is not safe during pregnancy. It is unknown if this medication is excreted in breast milk.
Terbinafine Pregnancy And Lactation Text: This medication is Pregnancy Category B and is considered safe during pregnancy. It is also excreted in breast milk and breast feeding isn't recommended.
Acitretin Counseling:  I discussed with the patient the risks of acitretin including but not limited to hair loss, dry lips/skin/eyes, liver damage, hyperlipidemia, depression/suicidal ideation, photosensitivity.  Serious rare side effects can include but are not limited to pancreatitis, pseudotumor cerebri, bony changes, clot formation/stroke/heart attack.  Patient understands that alcohol is contraindicated since it can result in liver toxicity and significantly prolong the elimination of the drug by many years.
Albendazole Pregnancy And Lactation Text: This medication is Pregnancy Category C and it isn't known if it is safe during pregnancy. It is also excreted in breast milk.
Winlevi Counseling:  I discussed with the patient the risks of topical clascoterone including but not limited to erythema, scaling, itching, and stinging. Patient voiced their understanding.
Libtayo Pregnancy And Lactation Text: This medication is contraindicated in pregnancy and when breast feeding.
Eucrisa Pregnancy And Lactation Text: This medication has not been assigned a Pregnancy Risk Category but animal studies failed to show danger with the topical medication. It is unknown if the medication is excreted in breast milk.
Minoxidil Counseling: Minoxidil is a topical medication which can increase blood flow where it is applied. It is uncertain how this medication increases hair growth. Side effects are uncommon and include stinging and allergic reactions.
Clofazimine Pregnancy And Lactation Text: This medication is Pregnancy Category C and isn't considered safe during pregnancy. It is excreted in breast milk.
Cyclophosphamide Counseling:  I discussed with the patient the risks of cyclophosphamide including but not limited to hair loss, hormonal abnormalities, decreased fertility, abdominal pain, diarrhea, nausea and vomiting, bone marrow suppression and infection. The patient understands that monitoring is required while taking this medication.
Thalidomide Counseling: I discussed with the patient the risks of thalidomide including but not limited to birth defects, anxiety, weakness, chest pain, dizziness, cough and severe allergy.
Cantharidin Pregnancy And Lactation Text: The use of this medication during pregnancy or lactation is not recommended as there is insufficient data.
Simponi Counseling:  I discussed with the patient the risks of golimumab including but not limited to myelosuppression, immunosuppression, autoimmune hepatitis, demyelinating diseases, lymphoma, and serious infections.  The patient understands that monitoring is required including a PPD at baseline and must alert us or the primary physician if symptoms of infection or other concerning signs are noted.
Hydroquinone Counseling:  Patient advised that medication may result in skin irritation, lightening (hypopigmentation), dryness, and burning.  In the event of skin irritation, the patient was advised to reduce the amount of the drug applied or use it less frequently.  Rarely, spots that are treated with hydroquinone can become darker (pseudoochronosis).  Should this occur, patient instructed to stop medication and call the office. The patient verbalized understanding of the proper use and possible adverse effects of hydroquinone.  All of the patient's questions and concerns were addressed.
Clindamycin Counseling: I counseled the patient regarding use of clindamycin as an antibiotic for prophylactic and/or therapeutic purposes. Clindamycin is active against numerous classes of bacteria, including skin bacteria. Side effects may include nausea, diarrhea, gastrointestinal upset, rash, hives, yeast infections, and in rare cases, colitis.
Oxybutynin Pregnancy And Lactation Text: This medication is Pregnancy Category B and is considered safe during pregnancy. It is unknown if it is excreted in breast milk.
Aklief Pregnancy And Lactation Text: It is unknown if this medication is safe to use during pregnancy.  It is unknown if this medication is excreted in breast milk.  Breastfeeding women should use the topical cream on the smallest area of the skin for the shortest time needed while breastfeeding.  Do not apply to nipple and areola.
Cimzia Pregnancy And Lactation Text: This medication crosses the placenta but can be considered safe in certain situations. Cimzia may be excreted in breast milk.
Finasteride Male Counseling: Finasteride Counseling:  I discussed with the patient the risks of use of finasteride including but not limited to decreased libido, decreased ejaculate volume, gynecomastia, and depression. Women should not handle medication.  All of the patient's questions and concerns were addressed.
Qbrexza Counseling:  I discussed with the patient the risks of Qbrexza including but not limited to headache, mydriasis, blurred vision, dry eyes, nasal dryness, dry mouth, dry throat, dry skin, urinary hesitation, and constipation.  Local skin reactions including erythema, burning, stinging, and itching can also occur.
Ilumya Counseling: I discussed with the patient the risks of tildrakizumab including but not limited to immunosuppression, malignancy, posterior leukoencephalopathy syndrome, and serious infections.  The patient understands that monitoring is required including a PPD at baseline and must alert us or the primary physician if symptoms of infection or other concerning signs are noted.
Fluconazole Counseling:  Patient counseled regarding adverse effects of fluconazole including but not limited to headache, diarrhea, nausea, upset stomach, liver function test abnormalities, taste disturbance, and stomach pain.  There is a rare possibility of liver failure that can occur when taking fluconazole.  The patient understands that monitoring of LFTs and kidney function test may be required, especially at baseline. The patient verbalized understanding of the proper use and possible adverse effects of fluconazole.  All of the patient's questions and concerns were addressed.
Quinolones Counseling:  I discussed with the patient the risks of fluoroquinolones including but not limited to GI upset, allergic reaction, drug rash, diarrhea, dizziness, photosensitivity, yeast infections, liver function test abnormalities, tendonitis/tendon rupture.
Azelaic Acid Counseling: Patient counseled that medicine may cause skin irritation and to avoid applying near the eyes.  In the event of skin irritation, the patient was advised to reduce the amount of the drug applied or use it less frequently.   The patient verbalized understanding of the proper use and possible adverse effects of azelaic acid.  All of the patient's questions and concerns were addressed.
Qbrexza Pregnancy And Lactation Text: There is no available data on Qbrexza use in pregnant women.  There is no available data on Qbrexza use in lactation.
Finasteride Pregnancy And Lactation Text: This medication is absolutely contraindicated during pregnancy. It is unknown if it is excreted in breast milk.
Azithromycin Counseling:  I discussed with the patient the risks of azithromycin including but not limited to GI upset, allergic reaction, drug rash, diarrhea, and yeast infections.
Ivermectin Counseling:  Patient instructed to take medication on an empty stomach with a full glass of water.  Patient informed of potential adverse effects including but not limited to nausea, diarrhea, dizziness, itching, and swelling of the extremities or lymph nodes.  The patient verbalized understanding of the proper use and possible adverse effects of ivermectin.  All of the patient's questions and concerns were addressed.
Acitretin Pregnancy And Lactation Text: This medication is Pregnancy Category X and should not be given to women who are pregnant or may become pregnant in the future. This medication is excreted in breast milk.
Topical Clindamycin Counseling: Patient counseled that this medication may cause skin irritation or allergic reactions.  In the event of skin irritation, the patient was advised to reduce the amount of the drug applied or use it less frequently.   The patient verbalized understanding of the proper use and possible adverse effects of clindamycin.  All of the patient's questions and concerns were addressed.
Niacinamide Counseling: I recommended taking niacin or niacinamide, also know as vitamin B3, twice daily. Recent evidence suggests that taking vitamin B3 (500 mg twice daily) can reduce the risk of actinic keratoses and non-melanoma skin cancers. Side effects of vitamin B3 include flushing and headache.
Winlevi Pregnancy And Lactation Text: This medication is considered safe during pregnancy and breastfeeding.
Cyclophosphamide Pregnancy And Lactation Text: This medication is Pregnancy Category D and it isn't considered safe during pregnancy. This medication is excreted in breast milk.
Zyclara Counseling:  I discussed with the patient the risks of imiquimod including but not limited to erythema, scaling, itching, weeping, crusting, and pain.  Patient understands that the inflammatory response to imiquimod is variable from person to person and was educated regarded proper titration schedule.  If flu-like symptoms develop, patient knows to discontinue the medication and contact us.
5-Fu Counseling: 5-Fluorouracil Counseling:  I discussed with the patient the risks of 5-fluorouracil including but not limited to erythema, scaling, itching, weeping, crusting, and pain.
Bexarotene Counseling:  I discussed with the patient the risks of bexarotene including but not limited to hair loss, dry lips/skin/eyes, liver abnormalities, hyperlipidemia, pancreatitis, depression/suicidal ideation, photosensitivity, drug rash/allergic reactions, hypothyroidism, anemia, leukopenia, infection, cataracts, and teratogenicity.  Patient understands that they will need regular blood tests to check lipid profile, liver function tests, white blood cell count, thyroid function tests and pregnancy test if applicable.
Xeljanz Counseling: I discussed with the patient the risks of Xeljanz therapy including increased risk of infection, liver issues, headache, diarrhea, or cold symptoms. Live vaccines should be avoided. They were instructed to call if they have any problems.
Fluconazole Pregnancy And Lactation Text: This medication is Pregnancy Category C and it isn't know if it is safe during pregnancy. It is also excreted in breast milk.
Colchicine Counseling:  Patient counseled regarding adverse effects including but not limited to stomach upset (nausea, vomiting, stomach pain, or diarrhea).  Patient instructed to limit alcohol consumption while taking this medication.  Colchicine may reduce blood counts especially with prolonged use.  The patient understands that monitoring of kidney function and blood counts may be required, especially at baseline. The patient verbalized understanding of the proper use and possible adverse effects of colchicine.  All of the patient's questions and concerns were addressed.
Propranolol Counseling:  I discussed with the patient the risks of propranolol including but not limited to low heart rate, low blood pressure, low blood sugar, restlessness and increased cold sensitivity. They should call the office if they experience any of these side effects.
Cimetidine Counseling:  I discussed with the patient the risks of Cimetidine including but not limited to gynecomastia, headache, diarrhea, nausea, drowsiness, arrhythmias, pancreatitis, skin rashes, psychosis, bone marrow suppression and kidney toxicity.
Mirvaso Counseling: Mirvaso is a topical medication which can decrease superficial blood flow where applied. Side effects are uncommon and include stinging, redness and allergic reactions.
Clindamycin Pregnancy And Lactation Text: This medication can be used in pregnancy if certain situations. Clindamycin is also present in breast milk.
Cosentyx Counseling:  I discussed with the patient the risks of Cosentyx including but not limited to worsening of Crohn's disease, immunosuppression, allergic reactions and infections.  The patient understands that monitoring is required including a PPD at baseline and must alert us or the primary physician if symptoms of infection or other concerning signs are noted.
Rhofade Counseling: Rhofade is a topical medication which can decrease superficial blood flow where applied. Side effects are uncommon and include stinging, redness and allergic reactions.
Griseofulvin Counseling:  I discussed with the patient the risks of griseofulvin including but not limited to photosensitivity, cytopenia, liver damage, nausea/vomiting and severe allergy.  The patient understands that this medication is best absorbed when taken with a fatty meal (e.g., ice cream or french fries).
Imiquimod Counseling:  I discussed with the patient the risks of imiquimod including but not limited to erythema, scaling, itching, weeping, crusting, and pain.  Patient understands that the inflammatory response to imiquimod is variable from person to person and was educated regarded proper titration schedule.  If flu-like symptoms develop, patient knows to discontinue the medication and contact us.
Azithromycin Pregnancy And Lactation Text: This medication is considered safe during pregnancy and is also secreted in breast milk.
Infliximab Counseling:  I discussed with the patient the risks of infliximab including but not limited to myelosuppression, immunosuppression, autoimmune hepatitis, demyelinating diseases, lymphoma, and serious infections.  The patient understands that monitoring is required including a PPD at baseline and must alert us or the primary physician if symptoms of infection or other concerning signs are noted.
Xelravinderz Pregnancy And Lactation Text: This medication is Pregnancy Category D and is not considered safe during pregnancy.  The risk during breast feeding is also uncertain.
Doxycycline Counseling:  Patient counseled regarding possible photosensitivity and increased risk for sunburn.  Patient instructed to avoid sunlight, if possible.  When exposed to sunlight, patients should wear protective clothing, sunglasses, and sunscreen.  The patient was instructed to call the office immediately if the following severe adverse effects occur:  hearing changes, easy bruising/bleeding, severe headache, or vision changes.  The patient verbalized understanding of the proper use and possible adverse effects of doxycycline.  All of the patient's questions and concerns were addressed.
Propranolol Pregnancy And Lactation Text: This medication is Pregnancy Category C and it isn't known if it is safe during pregnancy. It is excreted in breast milk.
Gabapentin Counseling: I discussed with the patient the risks of gabapentin including but not limited to dizziness, somnolence, fatigue and ataxia.
Niacinamide Pregnancy And Lactation Text: These medications are considered safe during pregnancy.
Azelaic Acid Pregnancy And Lactation Text: This medication is considered safe during pregnancy and breast feeding.
Topical Ketoconazole Counseling: Patient counseled that this medication may cause skin irritation or allergic reactions.  In the event of skin irritation, the patient was advised to reduce the amount of the drug applied or use it less frequently.   The patient verbalized understanding of the proper use and possible adverse effects of ketoconazole.  All of the patient's questions and concerns were addressed.
Nsaids Counseling: NSAID Counseling: I discussed with the patient that NSAIDs should be taken with food. Prolonged use of NSAIDs can result in the development of stomach ulcers.  Patient advised to stop taking NSAIDs if abdominal pain occurs.  The patient verbalized understanding of the proper use and possible adverse effects of NSAIDs.  All of the patient's questions and concerns were addressed.
Skyrizi Counseling: I discussed with the patient the risks of risankizumab-rzaa including but not limited to immunosuppression, and serious infections.  The patient understands that monitoring is required including a PPD at baseline and must alert us or the primary physician if symptoms of infection or other concerning signs are noted.
Bexarotene Pregnancy And Lactation Text: This medication is Pregnancy Category X and should not be given to women who are pregnant or may become pregnant. This medication should not be used if you are breast feeding.
Mirvaso Pregnancy And Lactation Text: This medication has not been assigned a Pregnancy Risk Category. It is unknown if the medication is excreted in breast milk.
Cyclosporine Counseling:  I discussed with the patient the risks of cyclosporine including but not limited to hypertension, gingival hyperplasia,myelosuppression, immunosuppression, liver damage, kidney damage, neurotoxicity, lymphoma, and serious infections. The patient understands that monitoring is required including baseline blood pressure, CBC, CMP, lipid panel and uric acid, and then 1-2 times monthly CMP and blood pressure.
Tranexamic Acid Counseling:  Patient advised of the small risk of bleeding problems with tranexamic acid. They were also instructed to call if they developed any nausea, vomiting or diarrhea. All of the patient's questions and concerns were addressed.
Doxycycline Pregnancy And Lactation Text: This medication is Pregnancy Category D and not consider safe during pregnancy. It is also excreted in breast milk but is considered safe for shorter treatment courses.
Opzelura Counseling:  I discussed with the patient the risks of Opzelura including but not limited to nasopharngitis, bronchitis, ear infection, eosinophila, hives, diarrhea, folliculitis, tonsillitis, and rhinorrhea.  Taken orally, this medication has been linked to serious infections; higher rate of mortality; malignancy and lymphoproliferative disorders; major adverse cardiovascular events; thrombosis; thrombocytopenia, anemia, and neutropenia; and lipid elevations.
Dapsone Counseling: I discussed with the patient the risks of dapsone including but not limited to hemolytic anemia, agranulocytosis, rashes, methemoglobinemia, kidney failure, peripheral neuropathy, headaches, GI upset, and liver toxicity.  Patients who start dapsone require monitoring including baseline LFTs and weekly CBCs for the first month, then every month thereafter.  The patient verbalized understanding of the proper use and possible adverse effects of dapsone.  All of the patient's questions and concerns were addressed.
Xolair Counseling:  Patient informed of potential adverse effects including but not limited to fever, muscle aches, rash and allergic reactions.  The patient verbalized understanding of the proper use and possible adverse effects of Xolair.  All of the patient's questions and concerns were addressed.
Griseofulvin Pregnancy And Lactation Text: This medication is Pregnancy Category X and is known to cause serious birth defects. It is unknown if this medication is excreted in breast milk but breast feeding should be avoided.
Tranexamic Acid Pregnancy And Lactation Text: It is unknown if this medication is safe during pregnancy or breast feeding.
Bactrim Counseling:  I discussed with the patient the risks of sulfa antibiotics including but not limited to GI upset, allergic reaction, drug rash, diarrhea, dizziness, photosensitivity, and yeast infections.  Rarely, more serious reactions can occur including but not limited to aplastic anemia, agranulocytosis, methemoglobinemia, blood dyscrasias, liver or kidney failure, lung infiltrates or desquamative/blistering drug rashes.
Dupixent Counseling: I discussed with the patient the risks of dupilumab including but not limited to eye infection and irritation, cold sores, injection site reactions, worsening of asthma, allergic reactions and increased risk of parasitic infection.  Live vaccines should be avoided while taking dupilumab. Dupilumab will also interact with certain medications such as warfarin and cyclosporine. The patient understands that monitoring is required and they must alert us or the primary physician if symptoms of infection or other concerning signs are noted.
Benzoyl Peroxide Counseling: Patient counseled that medicine may cause skin irritation and bleach clothing.  In the event of skin irritation, the patient was advised to reduce the amount of the drug applied or use it less frequently.   The patient verbalized understanding of the proper use and possible adverse effects of benzoyl peroxide.  All of the patient's questions and concerns were addressed.
Rifampin Counseling: I discussed with the patient the risks of rifampin including but not limited to liver damage, kidney damage, red-orange body fluids, nausea/vomiting and severe allergy.
Birth Control Pills Counseling: Birth Control Pill Counseling: I discussed with the patient the potential side effects of OCPs including but not limited to increased risk of stroke, heart attack, thrombophlebitis, deep venous thrombosis, hepatic adenomas, breast changes, GI upset, headaches, and depression.  The patient verbalized understanding of the proper use and possible adverse effects of OCPs. All of the patient's questions and concerns were addressed.
Bactrim Pregnancy And Lactation Text: This medication is Pregnancy Category D and is known to cause fetal risk.  It is also excreted in breast milk.
Isotretinoin Counseling: Patient should get monthly blood tests, not donate blood, not drive at night if vision affected, not share medication, and not undergo elective surgery for 6 months after tx completed. Side effects reviewed, pt to contact office should one occur.
Benzoyl Peroxide Pregnancy And Lactation Text: This medication is Pregnancy Category C. It is unknown if benzoyl peroxide is excreted in breast milk.
Rifampin Pregnancy And Lactation Text: This medication is Pregnancy Category C and it isn't know if it is safe during pregnancy. It is also excreted in breast milk and should not be used if you are breast feeding.
Nsaids Pregnancy And Lactation Text: These medications are considered safe up to 30 weeks gestation. It is excreted in breast milk.
Drysol Counseling:  I discussed with the patient the risks of drysol/aluminum chloride including but not limited to skin rash, itching, irritation, burning.
Doxepin Counseling:  Patient advised that the medication is sedating and not to drive a car after taking this medication. Patient informed of potential adverse effects including but not limited to dry mouth, urinary retention, and blurry vision.  The patient verbalized understanding of the proper use and possible adverse effects of doxepin.  All of the patient's questions and concerns were addressed.
Doxepin Pregnancy And Lactation Text: This medication is Pregnancy Category C and it isn't known if it is safe during pregnancy. It is also excreted in breast milk and breast feeding isn't recommended.
Methotrexate Counseling:  Patient counseled regarding adverse effects of methotrexate including but not limited to nausea, vomiting, abnormalities in liver function tests. Patients may develop mouth sores, rash, diarrhea, and abnormalities in blood counts. The patient understands that monitoring is required including LFT's and blood counts.  There is a rare possibility of scarring of the liver and lung problems that can occur when taking methotrexate. Persistent nausea, loss of appetite, pale stools, dark urine, cough, and shortness of breath should be reported immediately. Patient advised to discontinue methotrexate treatment at least three months before attempting to become pregnant.  I discussed the need for folate supplements while taking methotrexate.  These supplements can decrease side effects during methotrexate treatment. The patient verbalized understanding of the proper use and possible adverse effects of methotrexate.  All of the patient's questions and concerns were addressed.
Opzelura Pregnancy And Lactation Text: There is insufficient data to evaluate drug-associated risk for major birth defects, miscarriage, or other adverse maternal or fetal outcomes.  There is a pregnancy registry that monitors pregnancy outcomes in pregnant persons exposed to the medication during pregnancy.  It is unknown if this medication is excreted in breast milk.  Do not breastfeed during treatment and for about 4 weeks after the last dose.
Xolair Pregnancy And Lactation Text: This medication is Pregnancy Category B and is considered safe during pregnancy. This medication is excreted in breast milk.
Valtrex Counseling: I discussed with the patient the risks of valacyclovir including but not limited to kidney damage, nausea, vomiting and severe allergy.  The patient understands that if the infection seems to be worsening or is not improving, they are to call.
Stelara Counseling:  I discussed with the patient the risks of ustekinumab including but not limited to immunosuppression, malignancy, posterior leukoencephalopathy syndrome, and serious infections.  The patient understands that monitoring is required including a PPD at baseline and must alert us or the primary physician if symptoms of infection or other concerning signs are noted.
Oral Minoxidil Counseling- I discussed with the patient the risks of oral minoxidil including but not limited to shortness of breath, swelling of the feet or ankles, dizziness, lightheadedness, unwanted hair growth and allergic reaction.  The patient verbalized understanding of the proper use and possible adverse effects of oral minoxidil.  All of the patient's questions and concerns were addressed.
Opioid Counseling: I discussed with the patient the potential side effects of opioids including but not limited to addiction, altered mental status, and depression. I stressed avoiding alcohol, benzodiazepines, muscle relaxants and sleep aids unless specifically okayed by a physician. The patient verbalized understanding of the proper use and possible adverse effects of opioids. All of the patient's questions and concerns were addressed. They were instructed to flush the remaining pills down the toilet if they did not need them for pain.
Klisyri Counseling:  I discussed with the patient the risks of Klisyri including but not limited to erythema, scaling, itching, weeping, crusting, and pain.
Erythromycin Counseling:  I discussed with the patient the risks of erythromycin including but not limited to GI upset, allergic reaction, drug rash, diarrhea, increase in liver enzymes, and yeast infections.
Adbry Counseling: I discussed with the patient the risks of tralokinumab including but not limited to eye infection and irritation, cold sores, injection site reactions, worsening of asthma, allergic reactions and increased risk of parasitic infection.  Live vaccines should be avoided while taking tralokinumab. The patient understands that monitoring is required and they must alert us or the primary physician if symptoms of infection or other concerning signs are noted.
Birth Control Pills Pregnancy And Lactation Text: This medication should be avoided if pregnant and for the first 30 days post-partum.
Dapsone Pregnancy And Lactation Text: This medication is Pregnancy Category C and is not considered safe during pregnancy or breast feeding.
Solaraze Counseling:  I discussed with the patient the risks of Solaraze including but not limited to erythema, scaling, itching, weeping, crusting, and pain.
Olumiant Counseling: I discussed with the patient the risks of Olumiant therapy including but not limited to upper respiratory tract infections, shingles, cold sores, and nausea. Live vaccines should be avoided.  This medication has been linked to serious infections; higher rate of mortality; malignancy and lymphoproliferative disorders; major adverse cardiovascular events; thrombosis; gastrointestinal perforations; neutropenia; lymphopenia; anemia; liver enzyme elevations; and lipid elevations.
Itraconazole Counseling:  I discussed with the patient the risks of itraconazole including but not limited to liver damage, nausea/vomiting, neuropathy, and severe allergy.  The patient understands that this medication is best absorbed when taken with acidic beverages such as non-diet cola or ginger ale.  The patient understands that monitoring is required including baseline LFTs and repeat LFTs at intervals.  The patient understands that they are to contact us or the primary physician if concerning signs are noted.
Dupixent Pregnancy And Lactation Text: This medication likely crosses the placenta but the risk for the fetus is uncertain. This medication is excreted in breast milk.
Glycopyrrolate Counseling:  I discussed with the patient the risks of glycopyrrolate including but not limited to skin rash, drowsiness, dry mouth, difficulty urinating, and blurred vision.
Olumiant Pregnancy And Lactation Text: Based on animal studies, Olumiant may cause embryo-fetal harm when administered to pregnant women.  The medication should not be used in pregnancy.  Breastfeeding is not recommended during treatment.
Solaraze Pregnancy And Lactation Text: This medication is Pregnancy Category B and is considered safe. There is some data to suggest avoiding during the third trimester. It is unknown if this medication is excreted in breast milk.
Spironolactone Counseling: Patient advised regarding risks of diarrhea, abdominal pain, hyperkalemia, birth defects (for female patients), liver toxicity and renal toxicity. The patient may need blood work to monitor liver and kidney function and potassium levels while on therapy. The patient verbalized understanding of the proper use and possible adverse effects of spironolactone.  All of the patient's questions and concerns were addressed.
Glycopyrrolate Pregnancy And Lactation Text: This medication is Pregnancy Category B and is considered safe during pregnancy. It is unknown if it is excreted breast milk.
Opioid Pregnancy And Lactation Text: These medications can lead to premature delivery and should be avoided during pregnancy. These medications are also present in breast milk in small amounts.
Cephalexin Counseling: I counseled the patient regarding use of cephalexin as an antibiotic for prophylactic and/or therapeutic purposes. Cephalexin (commonly prescribed under brand name Keflex) is a cephalosporin antibiotic which is active against numerous classes of bacteria, including most skin bacteria. Side effects may include nausea, diarrhea, gastrointestinal upset, rash, hives, yeast infections, and in rare cases, hepatitis, kidney disease, seizures, fever, confusion, neurologic symptoms, and others. Patients with severe allergies to penicillin medications are cautioned that there is about a 10% incidence of cross-reactivity with cephalosporins. When possible, patients with penicillin allergies should use alternatives to cephalosporins for antibiotic therapy.
Isotretinoin Pregnancy And Lactation Text: This medication is Pregnancy Category X and is considered extremely dangerous during pregnancy. It is unknown if it is excreted in breast milk.
Carac Counseling:  I discussed with the patient the risks of Carac including but not limited to erythema, scaling, itching, weeping, crusting, and pain.
Topical Sulfur Applications Counseling: Topical Sulfur Counseling: Patient counseled that this medication may cause skin irritation or allergic reactions.  In the event of skin irritation, the patient was advised to reduce the amount of the drug applied or use it less frequently.   The patient verbalized understanding of the proper use and possible adverse effects of topical sulfur application.  All of the patient's questions and concerns were addressed.
Sarecycline Counseling: Patient advised regarding possible photosensitivity and discoloration of the teeth, skin, lips, tongue and gums.  Patient instructed to avoid sunlight, if possible.  When exposed to sunlight, patients should wear protective clothing, sunglasses, and sunscreen.  The patient was instructed to call the office immediately if the following severe adverse effects occur:  hearing changes, easy bruising/bleeding, severe headache, or vision changes.  The patient verbalized understanding of the proper use and possible adverse effects of sarecycline.  All of the patient's questions and concerns were addressed.
Odomzo Counseling- I discussed with the patient the risks of Odomzo including but not limited to nausea, vomiting, diarrhea, constipation, weight loss, changes in the sense of taste, decreased appetite, muscle spasms, and hair loss.  The patient verbalized understanding of the proper use and possible adverse effects of Odomzo.  All of the patient's questions and concerns were addressed.
Valtrex Pregnancy And Lactation Text: this medication is Pregnancy Category B and is considered safe during pregnancy. This medication is not directly found in breast milk but it's metabolite acyclovir is present.
High Dose Vitamin A Counseling: Side effects reviewed, pt to contact office should one occur.
Elidel Counseling: Patient may experience a mild burning sensation during topical application. Elidel is not approved in children less than 2 years of age. There have been case reports of hematologic and skin malignancies in patients using topical calcineurin inhibitors although causality is questionable.
Detail Level: Detailed
Picato Counseling:  I discussed with the patient the risks of Picato including but not limited to erythema, scaling, itching, weeping, crusting, and pain.
Hydroxyzine Counseling: Patient advised that the medication is sedating and not to drive a car after taking this medication.  Patient informed of potential adverse effects including but not limited to dry mouth, urinary retention, and blurry vision.  The patient verbalized understanding of the proper use and possible adverse effects of hydroxyzine.  All of the patient's questions and concerns were addressed.
Adbry Pregnancy And Lactation Text: It is unknown if this medication will adversely affect pregnancy or breast feeding.
Dutasteride Male Counseling: Dustasteride Counseling:  I discussed with the patient the risks of use of dutasteride including but not limited to decreased libido, decreased ejaculate volume, and gynecomastia. Women who can become pregnant should not handle medication.  All of the patient's questions and concerns were addressed.
Oral Minoxidil Pregnancy And Lactation Text: This medication should only be used when clearly needed if you are pregnant, attempting to become pregnant or breast feeding.
Erythromycin Pregnancy And Lactation Text: This medication is Pregnancy Category B and is considered safe during pregnancy. It is also excreted in breast milk.
Methotrexate Pregnancy And Lactation Text: This medication is Pregnancy Category X and is known to cause fetal harm. This medication is excreted in breast milk.
Enbrel Counseling:  I discussed with the patient the risks of etanercept including but not limited to myelosuppression, immunosuppression, autoimmune hepatitis, demyelinating diseases, lymphoma, and infections.  The patient understands that monitoring is required including a PPD at baseline and must alert us or the primary physician if symptoms of infection or other concerning signs are noted.
Prednisone Counseling:  I discussed with the patient the risks of prolonged use of prednisone including but not limited to weight gain, insomnia, osteoporosis, mood changes, diabetes, susceptibility to infection, glaucoma and high blood pressure.  In cases where prednisone use is prolonged, patients should be monitored with blood pressure checks, serum glucose levels and an eye exam.  Additionally, the patient may need to be placed on GI prophylaxis, PCP prophylaxis, and calcium and vitamin D supplementation and/or a bisphosphonate.  The patient verbalized understanding of the proper use and the possible adverse effects of prednisone.  All of the patient's questions and concerns were addressed.
Rinvoq Counseling: I discussed with the patient the risks of Rinvoq therapy including but not limited to upper respiratory tract infections, shingles, cold sores, bronchitis, nausea, cough, fever, acne, and headache. Live vaccines should be avoided.  This medication has been linked to serious infections; higher rate of mortality; malignancy and lymphoproliferative disorders; major adverse cardiovascular events; thrombosis; thrombocytopenia, anemia, and neutropenia; lipid elevations; liver enzyme elevations; and gastrointestinal perforations.
Ketoconazole Counseling:   Patient counseled regarding improving absorption with orange juice.  Adverse effects include but are not limited to breast enlargement, headache, diarrhea, nausea, upset stomach, liver function test abnormalities, taste disturbance, and stomach pain.  There is a rare possibility of liver failure that can occur when taking ketoconazole. The patient understands that monitoring of LFTs may be required, especially at baseline. The patient verbalized understanding of the proper use and possible adverse effects of ketoconazole.  All of the patient's questions and concerns were addressed.
Metronidazole Counseling:  I discussed with the patient the risks of metronidazole including but not limited to seizures, nausea/vomiting, a metallic taste in the mouth, nausea/vomiting and severe allergy.
Rituxan Counseling:  I discussed with the patient the risks of Rituxan infusions. Side effects can include infusion reactions, severe drug rashes including mucocutaneous reactions, reactivation of latent hepatitis and other infections and rarely progressive multifocal leukoencephalopathy.  All of the patient's questions and concerns were addressed.
Topical Retinoid counseling:  Patient advised to apply a pea-sized amount only at bedtime and wait 30 minutes after washing their face before applying.  If too drying, patient may add a non-comedogenic moisturizer. The patient verbalized understanding of the proper use and possible adverse effects of retinoids.  All of the patient's questions and concerns were addressed.
Hydroxychloroquine Counseling:  I discussed with the patient that a baseline ophthalmologic exam is needed at the start of therapy and every year thereafter while on therapy. A CBC may also be warranted for monitoring.  The side effects of this medication were discussed with the patient, including but not limited to agranulocytosis, aplastic anemia, seizures, rashes, retinopathy, and liver toxicity. Patient instructed to call the office should any adverse effect occur.  The patient verbalized understanding of the proper use and possible adverse effects of Plaquenil.  All the patient's questions and concerns were addressed.
Azathioprine Counseling:  I discussed with the patient the risks of azathioprine including but not limited to myelosuppression, immunosuppression, hepatotoxicity, lymphoma, and infections.  The patient understands that monitoring is required including baseline LFTs, Creatinine, possible TPMP genotyping and weekly CBCs for the first month and then every 2 weeks thereafter.  The patient verbalized understanding of the proper use and possible adverse effects of azathioprine.  All of the patient's questions and concerns were addressed.
Topical Sulfur Applications Pregnancy And Lactation Text: This medication is Pregnancy Category C and has an unknown safety profile during pregnancy. It is unknown if this topical medication is excreted in breast milk.

## 2022-08-29 RX ORDER — FLUOCINONIDE 0.5 MG/ML
SOLUTION TOPICAL BID
Qty: 60 | Refills: 3 | Status: ERX

## 2022-09-01 ENCOUNTER — APPOINTMENT (RX ONLY)
Dept: URBAN - METROPOLITAN AREA CLINIC 31 | Facility: CLINIC | Age: 72
Setting detail: DERMATOLOGY
End: 2022-09-01

## 2022-09-01 DIAGNOSIS — Z48.817 ENCOUNTER FOR SURGICAL AFTERCARE FOLLOWING SURGERY ON THE SKIN AND SUBCUTANEOUS TISSUE: ICD-10-CM

## 2022-09-01 PROCEDURE — ? POST-OP WOUND CHECK

## 2022-09-01 ASSESSMENT — LOCATION ZONE DERM: LOCATION ZONE: FINGER

## 2022-09-01 ASSESSMENT — LOCATION SIMPLE DESCRIPTION DERM: LOCATION SIMPLE: LEFT THUMB

## 2022-09-01 ASSESSMENT — LOCATION DETAILED DESCRIPTION DERM: LOCATION DETAILED: DORSAL INTERPHALANGEAL JOINT LEFT THUMB

## 2022-09-01 NOTE — PROCEDURE: POST-OP WOUND CHECK
Add 78795 Cpt? (Important Note: In 2017 The Use Of 17983 Is Being Tracked By Cms To Determine Future Global Period Reimbursement For Global Periods): no
Detail Level: Detailed

## 2022-09-15 ENCOUNTER — APPOINTMENT (RX ONLY)
Dept: URBAN - METROPOLITAN AREA CLINIC 31 | Facility: CLINIC | Age: 72
Setting detail: DERMATOLOGY
End: 2022-09-15

## 2022-09-15 ENCOUNTER — APPOINTMENT (RX ONLY)
Dept: URBAN - METROPOLITAN AREA CLINIC 6 | Facility: CLINIC | Age: 72
Setting detail: DERMATOLOGY
End: 2022-09-15

## 2022-09-15 DIAGNOSIS — L21.8 OTHER SEBORRHEIC DERMATITIS: ICD-10-CM | Status: WELL CONTROLLED

## 2022-09-15 DIAGNOSIS — L81.4 OTHER MELANIN HYPERPIGMENTATION: ICD-10-CM

## 2022-09-15 DIAGNOSIS — L72.8 OTHER FOLLICULAR CYSTS OF THE SKIN AND SUBCUTANEOUS TISSUE: ICD-10-CM

## 2022-09-15 DIAGNOSIS — L73.8 OTHER SPECIFIED FOLLICULAR DISORDERS: ICD-10-CM

## 2022-09-15 DIAGNOSIS — Z85.828 PERSONAL HISTORY OF OTHER MALIGNANT NEOPLASM OF SKIN: ICD-10-CM

## 2022-09-15 DIAGNOSIS — D18.0 HEMANGIOMA: ICD-10-CM

## 2022-09-15 DIAGNOSIS — Z71.89 OTHER SPECIFIED COUNSELING: ICD-10-CM

## 2022-09-15 DIAGNOSIS — Z48.02 ENCOUNTER FOR REMOVAL OF SUTURES: ICD-10-CM

## 2022-09-15 DIAGNOSIS — L82.1 OTHER SEBORRHEIC KERATOSIS: ICD-10-CM

## 2022-09-15 DIAGNOSIS — D22 MELANOCYTIC NEVI: ICD-10-CM

## 2022-09-15 PROBLEM — D22.62 MELANOCYTIC NEVI OF LEFT UPPER LIMB, INCLUDING SHOULDER: Status: ACTIVE | Noted: 2022-09-15

## 2022-09-15 PROBLEM — D22.5 MELANOCYTIC NEVI OF TRUNK: Status: ACTIVE | Noted: 2022-09-15

## 2022-09-15 PROBLEM — D22.61 MELANOCYTIC NEVI OF RIGHT UPPER LIMB, INCLUDING SHOULDER: Status: ACTIVE | Noted: 2022-09-15

## 2022-09-15 PROBLEM — D18.01 HEMANGIOMA OF SKIN AND SUBCUTANEOUS TISSUE: Status: ACTIVE | Noted: 2022-09-15

## 2022-09-15 PROCEDURE — 99213 OFFICE O/P EST LOW 20 MIN: CPT

## 2022-09-15 PROCEDURE — ? PHOTO-DOCUMENTATION

## 2022-09-15 PROCEDURE — 99211 OFF/OP EST MAY X REQ PHY/QHP: CPT

## 2022-09-15 PROCEDURE — ? SUTURE REMOVAL (NO GLOBAL PERIOD)

## 2022-09-15 PROCEDURE — ? COUNSELING

## 2022-09-15 ASSESSMENT — LOCATION SIMPLE DESCRIPTION DERM
LOCATION SIMPLE: LEFT SHOULDER
LOCATION SIMPLE: LEFT CHEEK
LOCATION SIMPLE: RIGHT HAND
LOCATION SIMPLE: LEFT THUMB
LOCATION SIMPLE: LEFT UPPER BACK
LOCATION SIMPLE: RIGHT UPPER BACK
LOCATION SIMPLE: RIGHT SHOULDER
LOCATION SIMPLE: CHEST
LOCATION SIMPLE: LEFT HAND
LOCATION SIMPLE: LEFT THUMB
LOCATION SIMPLE: ABDOMEN
LOCATION SIMPLE: LEFT UPPER ARM
LOCATION SIMPLE: RIGHT UPPER ARM
LOCATION SIMPLE: RIGHT FOREARM
LOCATION SIMPLE: LEFT FOREARM
LOCATION SIMPLE: RIGHT CHEEK
LOCATION SIMPLE: RIGHT SCALP

## 2022-09-15 ASSESSMENT — LOCATION DETAILED DESCRIPTION DERM
LOCATION DETAILED: LEFT MEDIAL UPPER BACK
LOCATION DETAILED: RIGHT RADIAL DORSAL HAND
LOCATION DETAILED: LEFT VENTRAL PROXIMAL FOREARM
LOCATION DETAILED: LEFT RADIAL DORSAL HAND
LOCATION DETAILED: RIGHT INFERIOR UPPER BACK
LOCATION DETAILED: RIGHT MEDIAL INFERIOR CHEST
LOCATION DETAILED: RIGHT VENTRAL PROXIMAL FOREARM
LOCATION DETAILED: RIGHT MEDIAL FRONTAL SCALP
LOCATION DETAILED: RIGHT CENTRAL MALAR CHEEK
LOCATION DETAILED: RIGHT MID-UPPER BACK
LOCATION DETAILED: RIGHT MEDIAL UPPER BACK
LOCATION DETAILED: RIGHT ANTERIOR PROXIMAL UPPER ARM
LOCATION DETAILED: RIGHT POSTERIOR SHOULDER
LOCATION DETAILED: LEFT ANTERIOR DISTAL UPPER ARM
LOCATION DETAILED: LEFT POSTERIOR SHOULDER
LOCATION DETAILED: LEFT CENTRAL MALAR CHEEK
LOCATION DETAILED: LEFT PROXIMAL RADIAL THUMB
LOCATION DETAILED: DORSAL INTERPHALANGEAL JOINT LEFT THUMB
LOCATION DETAILED: LEFT ANTERIOR PROXIMAL UPPER ARM
LOCATION DETAILED: MIDDLE STERNUM
LOCATION DETAILED: RIGHT ANTERIOR DISTAL UPPER ARM
LOCATION DETAILED: PERIUMBILICAL SKIN
LOCATION DETAILED: RIGHT SUPERIOR CENTRAL MALAR CHEEK
LOCATION DETAILED: RIGHT INFERIOR CENTRAL MALAR CHEEK

## 2022-09-15 ASSESSMENT — LOCATION ZONE DERM
LOCATION ZONE: HAND
LOCATION ZONE: FINGER
LOCATION ZONE: TRUNK
LOCATION ZONE: ARM
LOCATION ZONE: SCALP
LOCATION ZONE: FACE
LOCATION ZONE: FINGER

## 2022-09-15 NOTE — PROCEDURE: SUTURE REMOVAL (NO GLOBAL PERIOD)
Body Location Override (Optional - Billing Will Still Be Based On Selected Body Map Location If Applicable): Dorsal Interphalangeal Joint Left Thumb
Detail Level: Detailed

## 2022-09-15 NOTE — PROCEDURE: MIPS QUALITY
Quality 110: Preventive Care And Screening: Influenza Immunization: Influenza Immunization previously received during influenza season
Quality 431: Preventive Care And Screening: Unhealthy Alcohol Use - Screening: Patient not identified as an unhealthy alcohol user when screened for unhealthy alcohol use using a systematic screening method
Quality 226: Preventive Care And Screening: Tobacco Use: Screening And Cessation Intervention: Patient screened for tobacco use and is an ex/non-smoker
Quality 111:Pneumonia Vaccination Status For Older Adults: Pneumococcal vaccine (PPSV23) administered on or after patient’s 60th birthday and before the end of the measurement period
Detail Level: Detailed
Quality 130: Documentation Of Current Medications In The Medical Record: Current Medications Documented

## 2022-09-20 ENCOUNTER — RX ONLY (OUTPATIENT)
Age: 72
Setting detail: RX ONLY
End: 2022-09-20

## 2022-09-20 RX ORDER — HYDROCORTISONE 25 MG/G
CREAM TOPICAL
Qty: 30 | Refills: 5 | Status: ERX | COMMUNITY
Start: 2022-09-20

## 2022-09-27 ENCOUNTER — RX ONLY (OUTPATIENT)
Age: 72
Setting detail: RX ONLY
End: 2022-09-27

## 2022-09-27 ENCOUNTER — APPOINTMENT (RX ONLY)
Dept: URBAN - METROPOLITAN AREA CLINIC 31 | Facility: CLINIC | Age: 72
Setting detail: DERMATOLOGY
End: 2022-09-27

## 2022-09-27 DIAGNOSIS — Z48.817 ENCOUNTER FOR SURGICAL AFTERCARE FOLLOWING SURGERY ON THE SKIN AND SUBCUTANEOUS TISSUE: ICD-10-CM

## 2022-09-27 PROCEDURE — ? POST-OP WOUND CHECK

## 2022-09-27 PROCEDURE — ? ADDITIONAL NOTES

## 2022-09-27 RX ORDER — MUPIROCIN 20 MG/G
OINTMENT TOPICAL
Qty: 22 | Refills: 0 | Status: ERX | COMMUNITY
Start: 2022-09-27

## 2022-09-27 RX ORDER — DOXYCYCLINE HYCLATE 100 MG/1
CAPSULE, GELATIN COATED ORAL
Qty: 20 | Refills: 0 | Status: ERX

## 2022-09-27 ASSESSMENT — LOCATION ZONE DERM: LOCATION ZONE: FINGER

## 2022-09-27 ASSESSMENT — LOCATION SIMPLE DESCRIPTION DERM: LOCATION SIMPLE: LEFT THUMB

## 2022-09-27 ASSESSMENT — LOCATION DETAILED DESCRIPTION DERM: LOCATION DETAILED: LEFT DISTAL DORSAL THUMB

## 2022-09-27 NOTE — PROCEDURE: ADDITIONAL NOTES
Detail Level: Simple
Additional Notes: I examined the patient in detail. She has discomfort with range of motion of the left thumb but her range of motion is intact. Her MCP joint is slightly erythematous and edematous compared to the right MCP joint. She stated that she has been concerned about her joint since the surgery because the discomfort has progressively worsened since the surgery – though she did state that it is slightly improved today but not back to her baseline. She denies any systemic symptoms including fevers or chills.\\n\\n \\n\\nI reviewed photos on the patient’s phone. She does have a photo from over the weekend that is consistent with a spitting suture and I reassured her about that. However, she is complaining about pain more proximal to the area of the spitting suture and directly over the joint, which was the primary focus of our discussion.\\n\\n \\n\\nI called Dr. Dailey to speak with him about his patient. I explained that I was concerned about her joint being compromised given the tenderness, swelling and erythema and her complaint about the discomfort being localized to the joint. I recommended referral to ID to Dr. Dailey. Dr. Dailey was concerned about prolonged wait time to see ID and preferred to start the patient back on antibiotics. He preferred doxycycline or clindamycin. I let Dr. Dailey know that the patient had a doxycycline allergy but he felt that it wasn't a \"true allergy.\" The patient stated that when she took doxycycline she had trouble swallowing, which is why she stopped taking the antibiotics. I asked if he would be willing to see the patient and he stated that he would if the patient would drive to [place]. He did offer to speak with the patient on the phone and prescribe her antibiotics. I reiterated that I think it is important to have her joint evaluated by ID before starting antibiotics so I asked him to call the patient and prescribe antibiotics if that is the course that he thinks is most appropriate. I also discussed with him that I reviewed the suture removal photographs where the wound appears erythematous, edematous and impetiginized and I was concerned about a wound infection based on that photo from 9/15. Dr. Dailey stated that “the assistants removing the sutures do not know” how to assess for infection. I provided him with the patient’s cell phone and he called the patient to discuss restarting doxycycline and mupirocin, which he sent to her pharmacy on file. Per the patient, she was told that “her trouble swallowing resulted from not sitting upright after taking the medication.”\\n\\n \\n\\nI saw the patient again after she spoke with Dr. Dailey. She is not comfortable starting doxycycline as she is very concerned about how the medication affected her ability to swallow previously. She is more comfortable seeing ID for further evaluation as she is very concerned about her joint.\\n\\n \\n\\nAfter she left the office, I called [facility] and spoke with Dr. Marion. He recommended that she see a hand specialist to evaluate her joint. In terms of antibiotics, he suggested linezolid for 1-2 weeks but stated that her platelets would need to be monitored and that she would be need to be monitored for other side effects including but not limited to peripheral neuropathy. After speaking with him, I called Dr. Praveen Carpenter’s office and left my cell phone; however, he is not in the office again until 9/28. I also called [facility] and left my cell phone for Dr. Camacho. His MA returned my call and told me that they would be able to see the patient on 10/17 at 9:15am.\\n\\n \\n\\nI called the patient again at the end of the day. I informed her about IDs plan. She is not comfortable proceeding with linezolid at this time. I also let her know that ID recommended a hand specialist. I informed her that I was able to get her an appointment with Dr. Camacho on 10/17 at 9:15am. She stated that she was able to get an appointment with Dr. Praveen Carpenter on 9/28 at 4pm. I advised her to keep this appointment. I provided the patient with my cell phone in case Dr. Carpenter has questions. I texted her the information about the linezolid to share with Dr. Carpenter. I also discussed in detail signs/symptoms that would require emergent attention at an emergency room including but not limited to worsening swelling, worsening erythema, decreased range of motion, increasing pain, fevers and chills. I explained that these could be a sign of a more serious infection and could require immediate attention. The patient stated verbal understanding. All questions were answered.
Render Risk Assessment In Note?: yes

## 2022-09-27 NOTE — PROCEDURE: POST-OP WOUND CHECK
Add 16592 Cpt? (Important Note: In 2017 The Use Of 23202 Is Being Tracked By Cms To Determine Future Global Period Reimbursement For Global Periods): no
Additional Comments: Patient reports site dehiscence on 9/25/22,  prior to the time site was intact. Patient reports tenderness near joint area since 08/18/22 and states that it had gotten progressively worse since the surgery. Says that the tenderness, erythema and swelling had extended onto the thenar eminence at times. She states she is cleaning site with hydrogen peroxide, rinsing with water then applying neosporin with relief. She denies fever and chills. She states open areas have closed but tenderness has not resolved.
Wound Evaluated By: MARIETTA Serrano and Kat Larson MD
Detail Level: Detailed

## 2022-10-30 ENCOUNTER — RX ONLY (OUTPATIENT)
Age: 72
Setting detail: RX ONLY
End: 2022-10-30

## 2022-10-31 ENCOUNTER — OFFICE VISIT (OUTPATIENT)
Dept: CARDIOLOGY | Facility: MEDICAL CENTER | Age: 72
End: 2022-10-31
Payer: MEDICARE

## 2022-10-31 VITALS
SYSTOLIC BLOOD PRESSURE: 108 MMHG | BODY MASS INDEX: 25.52 KG/M2 | RESPIRATION RATE: 16 BRPM | OXYGEN SATURATION: 99 % | HEART RATE: 53 BPM | DIASTOLIC BLOOD PRESSURE: 64 MMHG | HEIGHT: 63 IN | WEIGHT: 144 LBS

## 2022-10-31 DIAGNOSIS — I48.0 PAF (PAROXYSMAL ATRIAL FIBRILLATION) (HCC): ICD-10-CM

## 2022-10-31 DIAGNOSIS — Z79.01 ANTICOAGULATED: ICD-10-CM

## 2022-10-31 LAB — EKG IMPRESSION: NORMAL

## 2022-10-31 PROCEDURE — 99214 OFFICE O/P EST MOD 30 MIN: CPT | Mod: 25 | Performed by: INTERNAL MEDICINE

## 2022-10-31 PROCEDURE — 93000 ELECTROCARDIOGRAM COMPLETE: CPT | Performed by: INTERNAL MEDICINE

## 2022-10-31 RX ORDER — FLECAINIDE ACETATE 50 MG/1
50 TABLET ORAL 2 TIMES DAILY
Qty: 180 TABLET | Refills: 3 | Status: ON HOLD
Start: 2022-10-31 | End: 2023-05-15

## 2022-10-31 RX ORDER — FLECAINIDE ACETATE 50 MG/1
50 TABLET ORAL 2 TIMES DAILY
COMMUNITY
End: 2022-10-31 | Stop reason: SDUPTHER

## 2022-10-31 ASSESSMENT — FIBROSIS 4 INDEX: FIB4 SCORE: 2.26

## 2022-10-31 NOTE — PROGRESS NOTES
"Chief Complaint   Patient presents with    Atrial Fibrillation     F/V Dx: Other persistent atrial fibrillation (HCC)       Subjective     Edilma Taylor is a 72 y.o. female who presents today with continued breakthrough of atrial fibrillation.  Evening episodes,vagally mediated.  Just started flecainide.  On metoprolol.  Not interested in sleep study.  [facility] recordings reviewed and showed A. Fib.    Past Medical History:   Diagnosis Date    Anesthesia     WAKES UP SLOWLY     hypotensive episode 2015    Arthritis     osteoarthritis in joints \"knees especially\"    Cancer (HCC) 1999    skin basal and squamous    Cataract     IOL bilateral    Pain     knee pain    Sensitive to concentration of IV drug     and oral drugs    Thyroid activity decreased     takes suppliment    Transfusion history 1974    hemorrage after D&C    Unspecified hemorrhagic conditions 1980s    from taking too much aspirin     Past Surgical History:   Procedure Laterality Date    KNEE ARTHROPLASTY TOTAL Right 6/27/2018    Procedure: KNEE ARTHROPLASTY TOTAL;  Surgeon: Sindi Ochoa M.D.;  Location: SURGERY [facility];  Service: Orthopedics    KNEE ARTHROSCOPY Right 12/28/2017    x 2    KNEE ARTHROSCOPY Right 12/28/2017    Procedure: RT KNEE ARTHROSCOPY, Chondroplasty, Partial Meniscectomy;  Surgeon: Jaswant Velez M.D.;  Location: SURGERY [facility];  Service: Orthopedics    CATARACT EXTRACTION WITH IOL Bilateral 2016    PTOSIS REPAIR  3/31/2015    Performed by Alfredo Marcus M.D. at SURGERY [facility]    HYSTERECTOMY, TOTAL ABDOMINAL  1992    APPENDECTOMY  1992    CARPAL TUNNEL RELEASE Bilateral 1991    BUNIONECTOMY Bilateral 1988    DILATION AND CURETTAGE       History reviewed. No pertinent family history.  Social History     Socioeconomic History    Marital status:      Spouse name: Not on file    Number of children: Not on file    Years of education: Not on file    Highest education level: Not on file "   Occupational History    Not on file   Tobacco Use    Smoking status: Never    Smokeless tobacco: Never   Substance and Sexual Activity    Alcohol use: Yes     Comment: socially    Drug use: No    Sexual activity: Not on file   Other Topics Concern    Not on file   Social History Narrative    Not on file     Social Determinants of Health     Financial Resource Strain: Not on file   Food Insecurity: Not on file   Transportation Needs: Not on file   Physical Activity: Not on file   Stress: Not on file   Social Connections: Not on file   Intimate Partner Violence: Not on file   Housing Stability: Not on file     Allergies   Allergen Reactions    Penicillins Hives and Rash     HIVES    HIVES    Morphine Nausea and Vomiting     EXTREME VOMITING  Other reaction(s): GI Upset  EXTREME VOMITING    Sulfa Drugs      EXTREME MUCOUS PRODUCTION  Other reaction(s): Rhinitis  EXTREME MUCOUS PRODUCTION    Gabapentin Palpitations    Kenalog      INJECTION  CAUSES VERTIGO / THRUSH    Triamcinolone Nausea and Palpitations     Other reaction(s): Dizziness, Flushing  INJECTION  CAUSES VERTIGO / THRUSH- kenolog     Outpatient Encounter Medications as of 10/31/2022   Medication Sig Dispense Refill    apixaban (ELIQUIS) 5mg Tab Take 1 Tablet by mouth 2 times a day.      flecainide (TAMBOCOR) 50 MG tablet Take 1 Tablet by mouth 2 times a day. 180 Tablet 3    levothyroxine (SYNTHROID) 50 MCG Tab 1 TABLET IN THE MORNING ON AN EMPTY STOMACH ORALLY ONCE A DAY      metoprolol SR (TOPROL XL) 25 MG TABLET SR 24 HR Take 25 mg by mouth.      ESTRADIOL PO       montelukast (SINGULAIR) 10 MG Tab TAKE 1 TABLET (10 MG) BY MOUTH NIGHTLY AT 9 PM      Bevacizumab (AVASTIN IV) Infuse  into a venous catheter.      acetaminophen (TYLENOL) 500 MG Tab Take 1.5 Tabs by mouth every 6 hours. 30 Tab 0    Bioflavonoid Products (GRAPE SEED) 250-50 MG Cap Take 1 Tab by mouth every day.      Nutritional Supplements (JOINT FORMULA PO) Take 1 Tab by mouth every day.       "ascorbic acid (ASCORBIC ACID) 500 MG TABS Take 1,000 mg by mouth every day.      therapeutic multivitamin-minerals (THERAGRAN-M) TABS Take 1 Tab by mouth every day.      Non Formulary Request Take 1 Cap by mouth every day. EYE VITAMIN      Probiotic Product (PROBIOTIC DAILY PO) Take 1 Cap by mouth every day.      Non Formulary Request Take 1 Cap by mouth every day. VITAMIN D + CALCIUM      [DISCONTINUED] flecainide (TAMBOCOR) 50 MG tablet Take 50 mg by mouth 2 times a day.      [DISCONTINUED] ondansetron (ZOFRAN ODT) 4 MG TABLET DISPERSIBLE Take 1 Tab by mouth every four hours as needed for Nausea. 10 Tab 0    [DISCONTINUED] docusate sodium 100 MG Cap Take 100 mg by mouth 2 times a day. 60 Cap 0    [DISCONTINUED] meloxicam (MOBIC) 7.5 MG Tab Take 7.5 mg by mouth as needed.       No facility-administered encounter medications on file as of 10/31/2022.     ROS           Objective     /64 (BP Location: Left arm, Patient Position: Sitting, BP Cuff Size: Adult)   Pulse (!) 53   Resp 16   Ht 1.6 m (5' 3\")   Wt 65.3 kg (144 lb)   SpO2 99%   BMI 25.51 kg/m²     Physical Exam  Constitutional:       Appearance: She is well-developed.   HENT:      Head: Normocephalic and atraumatic.   Eyes:      Pupils: Pupils are equal, round, and reactive to light.   Cardiovascular:      Rate and Rhythm: Normal rate and regular rhythm.      Heart sounds: Normal heart sounds. No murmur heard.    No friction rub. No gallop.   Pulmonary:      Effort: Pulmonary effort is normal.      Breath sounds: Normal breath sounds.   Abdominal:      General: Bowel sounds are normal.      Palpations: Abdomen is soft.   Musculoskeletal:         General: Normal range of motion.      Cervical back: Normal range of motion and neck supple.   Skin:     General: Skin is warm.   Neurological:      Mental Status: She is alert and oriented to person, place, and time.      Cranial Nerves: No cranial nerve deficit.   Psychiatric:         Behavior: Behavior " normal.         Thought Content: Thought content normal.         Judgment: Judgment normal.              Assessment & Plan     1. PAF (paroxysmal atrial fibrillation) ([facility])  EKG      2. Anticoagulated            Medical Decision Making: Today's Assessment/Status/Plan:   1.  Paroxysmal atrial fibrillation continue flecainide and metoprolol.  2.  Anticoagulation continue.  3.  Follow-up in 6 months.

## 2022-11-09 ENCOUNTER — PATIENT MESSAGE (OUTPATIENT)
Dept: HEALTH INFORMATION MANAGEMENT | Facility: OTHER | Age: 72
End: 2022-11-09

## 2022-12-15 ENCOUNTER — APPOINTMENT (RX ONLY)
Dept: URBAN - METROPOLITAN AREA CLINIC 31 | Facility: CLINIC | Age: 72
Setting detail: DERMATOLOGY
End: 2022-12-15

## 2022-12-15 DIAGNOSIS — Z71.89 OTHER SPECIFIED COUNSELING: ICD-10-CM

## 2022-12-15 DIAGNOSIS — D18.0 HEMANGIOMA: ICD-10-CM

## 2022-12-15 DIAGNOSIS — L82.1 OTHER SEBORRHEIC KERATOSIS: ICD-10-CM

## 2022-12-15 DIAGNOSIS — L81.4 OTHER MELANIN HYPERPIGMENTATION: ICD-10-CM

## 2022-12-15 DIAGNOSIS — Z85.828 PERSONAL HISTORY OF OTHER MALIGNANT NEOPLASM OF SKIN: ICD-10-CM

## 2022-12-15 DIAGNOSIS — D22 MELANOCYTIC NEVI: ICD-10-CM

## 2022-12-15 DIAGNOSIS — D49.2 NEOPLASM OF UNSPECIFIED BEHAVIOR OF BONE, SOFT TISSUE, AND SKIN: ICD-10-CM

## 2022-12-15 DIAGNOSIS — L21.8 OTHER SEBORRHEIC DERMATITIS: ICD-10-CM

## 2022-12-15 PROBLEM — D22.9 MELANOCYTIC NEVI, UNSPECIFIED: Status: ACTIVE | Noted: 2022-12-15

## 2022-12-15 PROBLEM — D18.01 HEMANGIOMA OF SKIN AND SUBCUTANEOUS TISSUE: Status: ACTIVE | Noted: 2022-12-15

## 2022-12-15 PROCEDURE — 11102 TANGNTL BX SKIN SINGLE LES: CPT

## 2022-12-15 PROCEDURE — ? BIOPSY BY SHAVE METHOD

## 2022-12-15 PROCEDURE — 99214 OFFICE O/P EST MOD 30 MIN: CPT | Mod: 25

## 2022-12-15 PROCEDURE — ? COUNSELING

## 2022-12-15 PROCEDURE — ? PRESCRIPTION

## 2022-12-15 RX ORDER — KETOCONAZOLE 20 MG/ML
1 SHAMPOO, SUSPENSION TOPICAL
Qty: 120 | Refills: 11 | Status: ERX | COMMUNITY
Start: 2022-12-15

## 2022-12-15 RX ADMIN — KETOCONAZOLE 1: 20 SHAMPOO, SUSPENSION TOPICAL at 00:00

## 2022-12-15 ASSESSMENT — LOCATION DETAILED DESCRIPTION DERM
LOCATION DETAILED: LEFT POSTERIOR ANKLE
LOCATION DETAILED: LEFT CENTRAL FRONTAL SCALP

## 2022-12-15 ASSESSMENT — LOCATION ZONE DERM
LOCATION ZONE: SCALP
LOCATION ZONE: LEG

## 2022-12-15 ASSESSMENT — LOCATION SIMPLE DESCRIPTION DERM
LOCATION SIMPLE: LEFT SCALP
LOCATION SIMPLE: LEFT ANKLE

## 2022-12-15 NOTE — PROCEDURE: COUNSELING
Detail Level: Zone
Detail Level: Detailed
Patient Specific Counseling (Will Not Stick From Patient To Patient): Patient advised to continue with topical steroid.\\n\\nDid offer biopsy to confirm today since she has 3 localized and small lesions and patient reported that they are painful when she presses on them. Did also say that they improved with topical steroid use but recurred after she stopped using the medication. Patient declined biopsy today.

## 2023-01-05 ENCOUNTER — PATIENT MESSAGE (OUTPATIENT)
Dept: CARDIOLOGY | Facility: MEDICAL CENTER | Age: 73
End: 2023-01-05
Payer: MEDICARE

## 2023-01-05 ENCOUNTER — APPOINTMENT (RX ONLY)
Dept: URBAN - METROPOLITAN AREA CLINIC 31 | Facility: CLINIC | Age: 73
Setting detail: DERMATOLOGY
End: 2023-01-05

## 2023-01-05 PROBLEM — D04.72 CARCINOMA IN SITU OF SKIN OF LEFT LOWER LIMB, INCLUDING HIP: Status: ACTIVE | Noted: 2023-01-05

## 2023-01-05 PROCEDURE — 17261 DSTRJ MAL LES T/A/L .6-1.0CM: CPT

## 2023-01-05 PROCEDURE — ? CURETTAGE AND DESTRUCTION

## 2023-01-05 NOTE — PROCEDURE: MIPS QUALITY
Quality 110: Preventive Care And Screening: Influenza Immunization: Influenza Immunization Administered during Influenza season
Quality 226: Preventive Care And Screening: Tobacco Use: Screening And Cessation Intervention: Patient screened for tobacco use and is an ex/non-smoker
Quality 111:Pneumonia Vaccination Status For Older Adults: Pneumococcal vaccine (PPSV23) administered on or after patient’s 60th birthday and before the end of the measurement period
Quality 130: Documentation Of Current Medications In The Medical Record: Current Medications Documented
Detail Level: Detailed
Quality 431: Preventive Care And Screening: Unhealthy Alcohol Use - Screening: Patient not identified as an unhealthy alcohol user when screened for unhealthy alcohol use using a systematic screening method
Quality 265: Biopsy Follow-Up: Biopsy results reviewed, communicated, tracked, and documented

## 2023-01-05 NOTE — PROCEDURE: CURETTAGE AND DESTRUCTION
Detail Level: Detailed
Accession # (Optional): [id number]
Number Of Curettages: 3
Size Of Lesion In Cm: 0.5
Size Of Lesion After Curettage: 0.9
Add Intralesional Injection: No
Concentration (Mg/Ml Or Millions Of Plaque Forming Units/Cc): 0.01
Total Volume (Ccs): 1
Anesthesia Type: 1% lidocaine with epinephrine
Cautery Type: electrodesiccation
What Was Performed First?: Curettage
Final Size Statement: The size of the lesion after curettage was
Additional Information: (Optional): The wound was cleaned, and a pressure dressing was applied.  The patient received detailed post-op instructions.
Consent was obtained from the patient. The risks, benefits and alternatives to therapy were discussed in detail. Specifically, the risks of infection, scarring, bleeding, prolonged wound healing, nerve injury, incomplete removal, allergy to anesthesia and recurrence were addressed. Alternatives to ED&C, such as: surgical removal and XRT were also discussed.  Prior to the procedure, the treatment site was clearly identified and confirmed by the patient. All components of Universal Protocol/PAUSE Rule completed.
Post-Care Instructions: I reviewed with the patient in detail post-care instructions. Patient is to keep the area dry for 48 hours, and not to engage in any swimming until the area is healed. Should the patient develop any fevers, chills, bleeding, severe pain patient will contact the office immediately.
Bill As A Line Item Or As Units: Line Item

## 2023-01-17 ENCOUNTER — OFFICE VISIT (OUTPATIENT)
Dept: CARDIOLOGY | Facility: MEDICAL CENTER | Age: 73
End: 2023-01-17
Payer: MEDICARE

## 2023-01-17 VITALS
HEART RATE: 57 BPM | WEIGHT: 149 LBS | BODY MASS INDEX: 26.4 KG/M2 | SYSTOLIC BLOOD PRESSURE: 116 MMHG | HEIGHT: 63 IN | DIASTOLIC BLOOD PRESSURE: 74 MMHG | OXYGEN SATURATION: 99 % | RESPIRATION RATE: 14 BRPM

## 2023-01-17 DIAGNOSIS — Z79.01 ANTICOAGULATED: ICD-10-CM

## 2023-01-17 DIAGNOSIS — I48.0 PAF (PAROXYSMAL ATRIAL FIBRILLATION) (HCC): ICD-10-CM

## 2023-01-17 LAB — EKG IMPRESSION: NORMAL

## 2023-01-17 PROCEDURE — 99214 OFFICE O/P EST MOD 30 MIN: CPT | Performed by: INTERNAL MEDICINE

## 2023-01-17 PROCEDURE — 93000 ELECTROCARDIOGRAM COMPLETE: CPT | Performed by: INTERNAL MEDICINE

## 2023-01-17 RX ORDER — FLECAINIDE ACETATE 150 MG/1
75 TABLET ORAL 2 TIMES DAILY
Qty: 90 TABLET | Refills: 3 | Status: ON HOLD
Start: 2023-01-17 | End: 2023-05-15

## 2023-01-17 ASSESSMENT — FIBROSIS 4 INDEX: FIB4 SCORE: 2.16

## 2023-01-17 NOTE — PROGRESS NOTES
"Chief Complaint   Patient presents with    Atrial Fibrillation     F/V Dx: PAF (paroxysmal atrial fibrillation) ([facility])       Subjective     Edilma Taylor is a 72 y.o. female who presents today history of paroxysmal atrial fibrillation on flecainide 50 twice daily and Toprol 12.5 mg daily also on NOAC.  Worsening episodes.  Was good for 2 months while on flecainide.    Past Medical History:   Diagnosis Date    Anesthesia     WAKES UP SLOWLY     hypotensive episode 2015    Arthritis     osteoarthritis in joints \"knees especially\"    Cancer ([facility]) 1999    skin basal and squamous    Cataract     IOL bilateral    Pain     knee pain    Sensitive to concentration of IV drug     and oral drugs    Thyroid activity decreased     takes suppliment    Transfusion history 1974    hemorrage after D&C    Unspecified hemorrhagic conditions 1980s    from taking too much aspirin     Past Surgical History:   Procedure Laterality Date    KNEE ARTHROPLASTY TOTAL Right 6/27/2018    Procedure: KNEE ARTHROPLASTY TOTAL;  Surgeon: Sindi Ochoa M.D.;  Location: SURGERY [facility];  Service: Orthopedics    KNEE ARTHROSCOPY Right 12/28/2017    x 2    KNEE ARTHROSCOPY Right 12/28/2017    Procedure: RT KNEE ARTHROSCOPY, Chondroplasty, Partial Meniscectomy;  Surgeon: Jaswant Velez M.D.;  Location: SURGERY [facility];  Service: Orthopedics    CATARACT EXTRACTION WITH IOL Bilateral 2016    PTOSIS REPAIR  3/31/2015    Performed by Alfredo Marcus M.D. at SURGERY [facility]    HYSTERECTOMY, TOTAL ABDOMINAL  1992    APPENDECTOMY  1992    CARPAL TUNNEL RELEASE Bilateral 1991    BUNIONECTOMY Bilateral 1988    DILATION AND CURETTAGE       History reviewed. No pertinent family history.  Social History     Socioeconomic History    Marital status:      Spouse name: Not on file    Number of children: Not on file    Years of education: Not on file    Highest education level: Not on file   Occupational History    Not on " file   Tobacco Use    Smoking status: Never    Smokeless tobacco: Never   Substance and Sexual Activity    Alcohol use: Yes     Comment: socially    Drug use: No    Sexual activity: Not on file   Other Topics Concern    Not on file   Social History Narrative    Not on file     Social Determinants of Health     Financial Resource Strain: Not on file   Food Insecurity: Not on file   Transportation Needs: Not on file   Physical Activity: Not on file   Stress: Not on file   Social Connections: Not on file   Intimate Partner Violence: Not on file   Housing Stability: Not on file     Allergies   Allergen Reactions    Penicillins Hives and Rash     HIVES    HIVES    Morphine Nausea and Vomiting     EXTREME VOMITING  Other reaction(s): GI Upset  EXTREME VOMITING    Sulfa Drugs      EXTREME MUCOUS PRODUCTION  Other reaction(s): Rhinitis  EXTREME MUCOUS PRODUCTION    Gabapentin Palpitations    Kenalog      INJECTION  CAUSES VERTIGO / THRUSH    Triamcinolone Nausea and Palpitations     Other reaction(s): Dizziness, Flushing  INJECTION  CAUSES VERTIGO / THRUSH- kenolog     Outpatient Encounter Medications as of 1/17/2023   Medication Sig Dispense Refill    flecainide (TAMBOCOR) 150 MG Tab Take 0.5 Tablets by mouth 2 times a day. 90 Tablet 3    apixaban (ELIQUIS) 5mg Tab Take 1 Tablet by mouth 2 times a day.      flecainide (TAMBOCOR) 50 MG tablet Take 1 Tablet by mouth 2 times a day. 180 Tablet 3    levothyroxine (SYNTHROID) 50 MCG Tab 1 TABLET IN THE MORNING ON AN EMPTY STOMACH ORALLY ONCE A DAY      metoprolol SR (TOPROL XL) 25 MG TABLET SR 24 HR Take 25 mg by mouth.      ESTRADIOL PO       montelukast (SINGULAIR) 10 MG Tab TAKE 1 TABLET (10 MG) BY MOUTH NIGHTLY AT 9 PM      Bioflavonoid Products (GRAPE SEED) 250-50 MG Cap Take 1 Tab by mouth every day.      Nutritional Supplements (JOINT FORMULA PO) Take 1 Tab by mouth every day.      ascorbic acid (ASCORBIC ACID) 500 MG TABS Take 1,000 mg by mouth every day.       "therapeutic multivitamin-minerals (THERAGRAN-M) TABS Take 1 Tab by mouth every day.      Non Formulary Request Take 1 Cap by mouth every day. EYE VITAMIN      Probiotic Product (PROBIOTIC DAILY PO) Take 1 Cap by mouth every day.      Non Formulary Request Take 1 Cap by mouth every day. VITAMIN D + CALCIUM      Bevacizumab (AVASTIN IV) Infuse  into a venous catheter.      [DISCONTINUED] acetaminophen (TYLENOL) 500 MG Tab Take 1.5 Tabs by mouth every 6 hours. (Patient not taking: Reported on 1/17/2023) 30 Tab 0     No facility-administered encounter medications on file as of 1/17/2023.     ROS           Objective     /74 (BP Location: Left arm, Patient Position: Sitting, BP Cuff Size: Adult)   Pulse (!) 57   Resp 14   Ht 1.6 m (5' 3\")   Wt 67.6 kg (149 lb)   SpO2 99%   BMI 26.39 kg/m²     Physical Exam  Constitutional:       Appearance: She is well-developed.   HENT:      Head: Normocephalic and atraumatic.   Eyes:      Pupils: Pupils are equal, round, and reactive to light.   Cardiovascular:      Rate and Rhythm: Normal rate and regular rhythm.      Heart sounds: Normal heart sounds. No murmur heard.    No friction rub. No gallop.   Pulmonary:      Effort: Pulmonary effort is normal.      Breath sounds: Normal breath sounds.   Abdominal:      General: Bowel sounds are normal.      Palpations: Abdomen is soft.   Musculoskeletal:         General: Normal range of motion.      Cervical back: Normal range of motion and neck supple.   Skin:     General: Skin is warm.   Neurological:      Mental Status: She is alert and oriented to person, place, and time.      Cranial Nerves: No cranial nerve deficit.   Psychiatric:         Behavior: Behavior normal.         Thought Content: Thought content normal.         Judgment: Judgment normal.              Assessment & Plan     1. PAF (paroxysmal atrial fibrillation) ([facility])  EKG      2. Anticoagulated            Medical Decision Making: Today's Assessment/Status/Plan:   1. "  Recurrent paroxysmal atrial fibrillation.  Increase flecainide to 75 twice daily.  Can use as needed extra dosing.  Continue Toprol.  2.  Anticoagulation continue.  3.  Discussed ablation.  4.  Follow-up in 2 months.

## 2023-01-30 PROBLEM — M79.671 RIGHT FOOT PAIN: Status: ACTIVE | Noted: 2023-01-30

## 2023-01-30 PROBLEM — M79.672 LEFT FOOT PAIN: Status: ACTIVE | Noted: 2023-01-30

## 2023-02-01 ENCOUNTER — PATIENT MESSAGE (OUTPATIENT)
Dept: CARDIOLOGY | Facility: MEDICAL CENTER | Age: 73
End: 2023-02-01
Payer: MEDICARE

## 2023-02-01 NOTE — PATIENT COMMUNICATION
Received surgical clearance request from [facility] for patient to have Tenotomy, Foot - Bilateral.     Forwarded to JS as DS is out of office.     Will fax back to [phone]

## 2023-02-07 NOTE — PATIENT COMMUNICATION
RN received the following updates from SUN:    SENTHIL Nguyen      Low cardiac risk for surgery. Hold OAC per surgeon recommendations, resume ASAP.     Continue to monitor heart rate. Okay to continue 12,5 mg of Toprol XL daily. Primarily concerned with symptomatic heart rate.     USN     Surgical clearance letter generated. Faxed electronically to [facility] fax # [phone].   My [employer]hart message sent to patient regarding medication recommendation.

## 2023-02-23 ENCOUNTER — TELEPHONE (OUTPATIENT)
Dept: CARDIOLOGY | Facility: MEDICAL CENTER | Age: 73
End: 2023-02-23
Payer: MEDICARE

## 2023-02-23 NOTE — TELEPHONE ENCOUNTER
Phone Number Called: [phone]    Call outcome: Spoke to patient regarding message below.    Message: Called to inform patient of JS recommendations.     Per patient, she has already spoke to Linn RESENDIZ.      RN reaching out to JS via [employer] for clarification on dosage.     Per JS, recommended that patient continue current regimen that she stated in Mychart of 75 mg Flecainide in the morning and 50 mg in the evening.     Patient verbalized understanding. No further questions at this time.       ----- Message from SENTHIL Nguyen sent at 2/16/2023  7:35 AM PST -----  Okay to reduce Flecainide back to 50 mg BID. May have more AF breakthrough though.       ----- Message -----  From: SENTHIL Nguyen  Sent: 2/15/2023   8:14 PM PST  To: SENTHIL Nguyen    S/E with Flecainide.

## 2023-03-29 ENCOUNTER — OFFICE VISIT (OUTPATIENT)
Dept: CARDIOLOGY | Facility: MEDICAL CENTER | Age: 73
End: 2023-03-29
Payer: MEDICARE

## 2023-03-29 ENCOUNTER — TELEPHONE (OUTPATIENT)
Dept: CARDIOLOGY | Facility: MEDICAL CENTER | Age: 73
End: 2023-03-29

## 2023-03-29 VITALS
RESPIRATION RATE: 12 BRPM | BODY MASS INDEX: 27.42 KG/M2 | OXYGEN SATURATION: 97 % | WEIGHT: 149 LBS | DIASTOLIC BLOOD PRESSURE: 70 MMHG | HEIGHT: 62 IN | HEART RATE: 58 BPM | SYSTOLIC BLOOD PRESSURE: 106 MMHG

## 2023-03-29 DIAGNOSIS — I48.0 PAF (PAROXYSMAL ATRIAL FIBRILLATION) (HCC): ICD-10-CM

## 2023-03-29 DIAGNOSIS — Z79.01 ANTICOAGULATED: ICD-10-CM

## 2023-03-29 LAB — EKG IMPRESSION: NORMAL

## 2023-03-29 PROCEDURE — 99214 OFFICE O/P EST MOD 30 MIN: CPT | Performed by: INTERNAL MEDICINE

## 2023-03-29 PROCEDURE — 93000 ELECTROCARDIOGRAM COMPLETE: CPT | Performed by: INTERNAL MEDICINE

## 2023-03-29 ASSESSMENT — FIBROSIS 4 INDEX: FIB4 SCORE: 2.12

## 2023-03-29 NOTE — PROGRESS NOTES
"Chief Complaint   Patient presents with    Atrial Fibrillation     F/v Dx:PAF (paroxysmal atrial fibrillation)       Subjective     Edilma Taylor is a 72 y.o. female who presents today with continued breakthrough of atrial fibrillation despite flecainide.  Difficulty at higher doses.  Currently on 50/75.  On anticoagulation.  Has not had a sleep study.  Recent foot surgery.  Arrhythmias well documented on iWatch.    Past Medical History:   Diagnosis Date    Anesthesia     WAKES UP SLOWLY     hypotensive episode 2015    Arthritis     osteoarthritis in joints \"knees especially\"    Cancer ([facility]) 1999    skin basal and squamous    Cataract     IOL bilateral    Pain     knee pain    Sensitive to concentration of IV drug     and oral drugs    Thyroid activity decreased     takes suppliment    Transfusion history 1974    hemorrage after D&C    Unspecified hemorrhagic conditions 1980s    from taking too much aspirin     Past Surgical History:   Procedure Laterality Date    NY TENOTOMY PERC TOE MULTIPLE TENDONS Bilateral 3/6/2023    Procedure: LEFT THIRD AND FOURTH TOE FLEXOR TENOTOMY, RIGHT THIRD, FOURTH AND FIFTH TOE FLEXOR TENOTOMY;  Surgeon: Noel Mansfield M.D.;  Location: [facility];  Service: Orthopedics    PB REPAIR OF HAMMERTOE,ONE Bilateral 3/6/2023    Procedure: LEFT DISTAL INTERPHALANGEAL JOINT FUSION, RIGHT THIRD AND FOURTH DISTAL INTERPHALANGEAL JOINT FUSION,  RIGHT FIFTH PROXIMAL INTERPHALANGEAL FUSION, RIGHT DISTAL PHALANX EXOSECTOMY;  Surgeon: Noel Mansfield M.D.;  Location: [facility];  Service: Orthopedics    KNEE ARTHROPLASTY TOTAL Right 6/27/2018    Procedure: KNEE ARTHROPLASTY TOTAL;  Surgeon: Sindi Ohcoa M.D.;  Location: SURGERY [facility];  Service: Orthopedics    KNEE ARTHROSCOPY Right 12/28/2017    x 2    KNEE ARTHROSCOPY Right 12/28/2017    Procedure: RT KNEE ARTHROSCOPY, Chondroplasty, Partial Meniscectomy;  Surgeon: Jaswant HOSKINS" "KEITH Velez;  Location: SURGERY [facility];  Service: Orthopedics    CATARACT EXTRACTION WITH IOL Bilateral 2016    PTOSIS REPAIR  3/31/2015    Performed by Alfredo Marcus M.D. at SURGERY [facility]    HYSTERECTOMY, TOTAL ABDOMINAL  1992    APPENDECTOMY  1992    CARPAL TUNNEL RELEASE Bilateral 1991    BUNIONECTOMY Bilateral 1988    DILATION AND CURETTAGE       History reviewed. No pertinent family history.  Social History     Socioeconomic History    Marital status:      Spouse name: Not on file    Number of children: Not on file    Years of education: Not on file    Highest education level: Not on file   Occupational History    Not on file   Tobacco Use    Smoking status: Never    Smokeless tobacco: Never   Substance and Sexual Activity    Alcohol use: Yes     Comment: socially    Drug use: No    Sexual activity: Not on file   Other Topics Concern    Not on file   Social History Narrative    Not on file     Social Determinants of Health     Financial Resource Strain: Not on file   Food Insecurity: Not on file   Transportation Needs: Not on file   Physical Activity: Not on file   Stress: Not on file   Social Connections: Not on file   Intimate Partner Violence: Not on file   Housing Stability: Not on file     Allergies   Allergen Reactions    Kenalog Rash     INJECTION  CAUSES VERTIGO / THRUSH  Other reaction(s): Flushing    Penicillins Hives and Rash     HIVES    HIVES    Doxycycline Unspecified     \"Hurts to swallow\"  Other reaction(s): Other    Morphine Nausea and Vomiting     EXTREME VOMITING  Other reaction(s): GI Upset  EXTREME VOMITING    Sulfa Drugs      EXTREME MUCOUS PRODUCTION  Other reaction(s): Rhinitis  EXTREME MUCOUS PRODUCTION    Gabapentin Palpitations     Outpatient Encounter Medications as of 3/29/2023   Medication Sig Dispense Refill    hydrocortisone 2.5 % Cream topical cream PLEASE SEE ATTACHED FOR DETAILED DIRECTIONS      hydrocortisone 2.5 % Cream topical cream " hydrocortisone 2.5 % topical cream   PLEASE SEE ATTACHED FOR DETAILED DIRECTIONS      acetaminophen (TYLENOL) 500 MG Tab Take 2 tabs up to 3 times a day prn pain 60 Tablet 0    flecainide (TAMBOCOR) 150 MG Tab Take 0.5 Tablets by mouth 2 times a day. (Patient taking differently: Take 75 mg by mouth every day.) 90 Tablet 3    apixaban (ELIQUIS) 5mg Tab Take 1 Tablet by mouth 2 times a day.      flecainide (TAMBOCOR) 50 MG tablet Take 1 Tablet by mouth 2 times a day. (Patient taking differently: Take 50 mg by mouth every day.) 180 Tablet 3    levothyroxine (SYNTHROID) 50 MCG Tab 1 TABLET IN THE MORNING ON AN EMPTY STOMACH ORALLY ONCE A DAY      metoprolol SR (TOPROL XL) 25 MG TABLET SR 24 HR Take 25 mg by mouth.      ESTRADIOL PO       montelukast (SINGULAIR) 10 MG Tab TAKE 1 TABLET (10 MG) BY MOUTH NIGHTLY AT 9 PM      Nutritional Supplements (JOINT FORMULA PO) Take 1 Tab by mouth every day.      ascorbic acid (ASCORBIC ACID) 500 MG TABS Take 1,000 mg by mouth every day.      therapeutic multivitamin-minerals (THERAGRAN-M) TABS Take 1 Tab by mouth every day.      Non Formulary Request Take 1 Cap by mouth every day. EYE VITAMIN      Probiotic Product (PROBIOTIC DAILY PO) Take 1 Cap by mouth every day.      Non Formulary Request Take 1 Cap by mouth every day. VITAMIN D + CALCIUM      azithromycin (ZITHROMAX) 250 MG Tab azithromycin 250 mg tablet      doxycycline (VIBRAMYCIN) 100 MG Cap doxycycline hyclate 100 mg capsule   TAKE 1 CAPSULE BY MOUTH TWICE A DAY FOR 10 DAYS      [DISCONTINUED] NON SPECIFIED [id number] cream (n)   APPLY 1 to 3 GRAMS TO THE AFFECTED AREA 3 to 4 TIMES DAILY (Patient not taking: Reported on 3/29/2023)      hydrOXYzine pamoate (VISTARIL) 25 MG Cap Take 1 Capsule by mouth 3 times a day as needed for Itching (nausea, insomnia). 20 Capsule 0    Bioflavonoid Products (GRAPE SEED) 250-50 MG Cap Take 1 Tab by mouth every day.       No facility-administered encounter medications on file as of 3/29/2023.  "    ROS           Objective     /70 (BP Location: Left arm, Patient Position: Sitting, BP Cuff Size: Adult)   Pulse (!) 58   Resp 12   Ht 1.575 m (5' 2\")   Wt 67.6 kg (149 lb)   SpO2 97%   BMI 27.25 kg/m²     Physical Exam  Constitutional:       Appearance: She is well-developed.   HENT:      Head: Normocephalic and atraumatic.   Eyes:      Pupils: Pupils are equal, round, and reactive to light.   Cardiovascular:      Rate and Rhythm: Normal rate and regular rhythm.      Heart sounds: Normal heart sounds. No murmur heard.    No friction rub. No gallop.   Pulmonary:      Effort: Pulmonary effort is normal.      Breath sounds: Normal breath sounds.   Abdominal:      General: Bowel sounds are normal.      Palpations: Abdomen is soft.   Musculoskeletal:         General: Normal range of motion.      Cervical back: Normal range of motion and neck supple.   Skin:     General: Skin is warm.   Neurological:      Mental Status: She is alert and oriented to person, place, and time.      Cranial Nerves: No cranial nerve deficit.   Psychiatric:         Behavior: Behavior normal.         Thought Content: Thought content normal.         Judgment: Judgment normal.              Assessment & Plan     1. PAF (paroxysmal atrial fibrillation) ([facility])  EKG    Referral to Pulmonary and Sleep Medicine      2. Anticoagulated            Medical Decision Making: Today's Assessment/Status/Plan:   1.  Paroxysmal atrial fibrillation.  Recurrent and problematic despite antiarrhythmics recommendation for catheter ablation..  2.  Order sleep study.  3.  Continue anticoagulation.  The risks, benefits,and alternatives to atrial fibrillation ablation with general anesthesia were discussed in great detail. Specific risks mentioned including bleeding, infection, arteriovenous fistula/pseudoaneurysm related to sheath placement, cardiac perforation with possible tamponade requiring pericardiocentesis or possible open heart surgery were " discussed. In addition,we discussed atrial fibrillation ablation specific complications including pulmonary vein stenosis, left atrial perforation (2-4%), and nerve damage involving the recurrent laryngeal nerve, the vagus nerve with resulting gastroparesis, and the phrenic nerve.  Also mentioned was a 1/400 (0.25%) occurrence of atrial esophageal fistula, which is an abnormal communication between the esophagus and the left atrium; this complication is often fatal. Lastly the risks of death, myocardial infarction, stroke, DVT and PE were discussed. The patient verbalized understanding of these potential complications and wishes to proceed with this procedure.  The risks, benefits, and alternatives to transesophageal echocardiogram with IV sedation were discussed with the patient in specific detail, including oropharyngeal and esophageal traumas including hoarseness and dysphagia after the procedure. Rare cases demonstrating serious or fatal complications associated with transesophageal echocardiogram have been reported in the adult population, including cardiac, pulmonary and bleeding complications in less than 1% of people. Patients with an identified intracardiac thrombus are at increased risk for embolic events and this appears to be reduced with anticoagulant therapy. The patient verbalized understandings about these  possible complications and wishes to proceed with this procedure

## 2023-03-29 NOTE — TELEPHONE ENCOUNTER
Shaneka,    Can you please enter the orders for this procedure?    Afib ablation w/[facility]    Thank You,  Gayle

## 2023-03-30 NOTE — TELEPHONE ENCOUNTER
Patient scheduled for afib ablation w/[facility] on 5-15-23 with Dr. Flaherty. Patient has been instructed to check in at 6:00 for 7:30 case time. Hold Flec 2 days. Message sent to authorizations. Emailed Carto

## 2023-04-04 ENCOUNTER — TELEPHONE (OUTPATIENT)
Dept: HEALTH INFORMATION MANAGEMENT | Facility: OTHER | Age: 73
End: 2023-04-04
Payer: MEDICARE

## 2023-04-06 ENCOUNTER — PATIENT MESSAGE (OUTPATIENT)
Dept: CARDIOLOGY | Facility: MEDICAL CENTER | Age: 73
End: 2023-04-06
Payer: MEDICARE

## 2023-04-12 ENCOUNTER — APPOINTMENT (OUTPATIENT)
Dept: ADMISSIONS | Facility: MEDICAL CENTER | Age: 73
End: 2023-04-12
Attending: INTERNAL MEDICINE
Payer: MEDICARE

## 2023-04-17 ENCOUNTER — PRE-ADMISSION TESTING (OUTPATIENT)
Dept: ADMISSIONS | Facility: MEDICAL CENTER | Age: 73
End: 2023-04-17
Attending: INTERNAL MEDICINE
Payer: MEDICARE

## 2023-05-04 ENCOUNTER — APPOINTMENT (RX ONLY)
Dept: URBAN - METROPOLITAN AREA CLINIC 31 | Facility: CLINIC | Age: 73
Setting detail: DERMATOLOGY
End: 2023-05-04

## 2023-05-04 DIAGNOSIS — L82.1 OTHER SEBORRHEIC KERATOSIS: ICD-10-CM

## 2023-05-04 DIAGNOSIS — L57.0 ACTINIC KERATOSIS: ICD-10-CM

## 2023-05-04 PROBLEM — D48.5 NEOPLASM OF UNCERTAIN BEHAVIOR OF SKIN: Status: ACTIVE | Noted: 2023-05-04

## 2023-05-04 PROCEDURE — 99212 OFFICE O/P EST SF 10 MIN: CPT | Mod: 25

## 2023-05-04 PROCEDURE — 11102 TANGNTL BX SKIN SINGLE LES: CPT

## 2023-05-04 PROCEDURE — ? BIOPSY BY SHAVE METHOD

## 2023-05-04 PROCEDURE — ? LIQUID NITROGEN

## 2023-05-04 PROCEDURE — 17003 DESTRUCT PREMALG LES 2-14: CPT

## 2023-05-04 PROCEDURE — ? COUNSELING

## 2023-05-04 PROCEDURE — 17000 DESTRUCT PREMALG LESION: CPT | Mod: 59

## 2023-05-04 ASSESSMENT — LOCATION ZONE DERM: LOCATION ZONE: TRUNK

## 2023-05-04 ASSESSMENT — LOCATION SIMPLE DESCRIPTION DERM: LOCATION SIMPLE: CHEST

## 2023-05-04 ASSESSMENT — LOCATION DETAILED DESCRIPTION DERM
LOCATION DETAILED: LEFT LATERAL SUPERIOR CHEST
LOCATION DETAILED: LEFT MEDIAL SUPERIOR CHEST

## 2023-05-04 NOTE — HPI: SKIN LESION
Is This A New Presentation, Or A Follow-Up?: Skin Lesions
What Type Of Note Output Would You Prefer (Optional)?: Standard Output
How Severe Is Your Skin Lesion?: moderate
Has Your Skin Lesion Been Treated?: not been treated
Albendazole Counseling:  I discussed with the patient the risks of albendazole including but not limited to cytopenia, kidney damage, nausea/vomiting and severe allergy.  The patient understands that this medication is being used in an off-label manner.

## 2023-05-04 NOTE — PROCEDURE: MIPS QUALITY
Quality 226: Preventive Care And Screening: Tobacco Use: Screening And Cessation Intervention: Patient screened for tobacco use and is an ex/non-smoker
Quality 130: Documentation Of Current Medications In The Medical Record: Current Medications Documented
Quality 111:Pneumonia Vaccination Status For Older Adults: Patient received any pneumococcal conjugate or polysaccharide vaccine on or after their 60th birthday and before the end of the measurement period
Quality 431: Preventive Care And Screening: Unhealthy Alcohol Use - Screening: Patient not identified as an unhealthy alcohol user when screened for unhealthy alcohol use using a systematic screening method
Detail Level: Detailed
Quality 110: Preventive Care And Screening: Influenza Immunization: Influenza Immunization previously received during influenza season

## 2023-05-10 ENCOUNTER — PATIENT MESSAGE (OUTPATIENT)
Dept: CARDIOLOGY | Facility: MEDICAL CENTER | Age: 73
End: 2023-05-10
Payer: MEDICARE

## 2023-05-10 ENCOUNTER — TELEPHONE (OUTPATIENT)
Dept: CARDIOLOGY | Facility: MEDICAL CENTER | Age: 73
End: 2023-05-10

## 2023-05-10 NOTE — TELEPHONE ENCOUNTER
MICHAEL    Caller: Edilma Taylor     Topic/issue: patient would like to now if she is to take levothyroxine (SYNTHROID) 50 MCG Tab  Prior to her labs tomorrow .    Callback Number: [phone]      Thank you   Jami MENDOZA

## 2023-05-11 ENCOUNTER — PRE-ADMISSION TESTING (OUTPATIENT)
Dept: ADMISSIONS | Facility: MEDICAL CENTER | Age: 73
End: 2023-05-11
Attending: INTERNAL MEDICINE
Payer: MEDICARE

## 2023-05-11 DIAGNOSIS — Z01.812 PRE-OPERATIVE LABORATORY EXAMINATION: ICD-10-CM

## 2023-05-11 DIAGNOSIS — Z01.810 PRE-OPERATIVE CARDIOVASCULAR EXAMINATION: ICD-10-CM

## 2023-05-11 LAB
ALBUMIN SERPL BCP-MCNC: 4.5 G/DL (ref 3.2–4.9)
ALBUMIN/GLOB SERPL: 1.9 G/DL
ALP SERPL-CCNC: 62 U/L (ref 30–99)
ALT SERPL-CCNC: 21 U/L (ref 2–50)
ANION GAP SERPL CALC-SCNC: 11 MMOL/L (ref 7–16)
AST SERPL-CCNC: 21 U/L (ref 12–45)
BASOPHILS # BLD AUTO: 0.9 % (ref 0–1.8)
BASOPHILS # BLD: 0.04 K/UL (ref 0–0.12)
BILIRUB SERPL-MCNC: 1 MG/DL (ref 0.1–1.5)
BUN SERPL-MCNC: 28 MG/DL (ref 8–22)
CALCIUM ALBUM COR SERPL-MCNC: 9.1 MG/DL (ref 8.5–10.5)
CALCIUM SERPL-MCNC: 9.5 MG/DL (ref 8.5–10.5)
CHLORIDE SERPL-SCNC: 103 MMOL/L (ref 96–112)
CO2 SERPL-SCNC: 26 MMOL/L (ref 20–33)
CREAT SERPL-MCNC: 0.77 MG/DL (ref 0.5–1.4)
EKG IMPRESSION: NORMAL
EOSINOPHIL # BLD AUTO: 0.08 K/UL (ref 0–0.51)
EOSINOPHIL NFR BLD: 1.9 % (ref 0–6.9)
ERYTHROCYTE [DISTWIDTH] IN BLOOD BY AUTOMATED COUNT: 48.4 FL (ref 35.9–50)
GFR SERPLBLD CREATININE-BSD FMLA CKD-EPI: 83 ML/MIN/1.73 M 2
GLOBULIN SER CALC-MCNC: 2.4 G/DL (ref 1.9–3.5)
GLUCOSE SERPL-MCNC: 102 MG/DL (ref 65–99)
HCT VFR BLD AUTO: 44.8 % (ref 37–47)
HGB BLD-MCNC: 14.4 G/DL (ref 12–16)
IMM GRANULOCYTES # BLD AUTO: 0.01 K/UL (ref 0–0.11)
IMM GRANULOCYTES NFR BLD AUTO: 0.2 % (ref 0–0.9)
INR PPP: 1.09 (ref 0.87–1.13)
LYMPHOCYTES # BLD AUTO: 1.84 K/UL (ref 1–4.8)
LYMPHOCYTES NFR BLD: 43 % (ref 22–41)
MCH RBC QN AUTO: 29.5 PG (ref 27–33)
MCHC RBC AUTO-ENTMCNC: 32.1 G/DL (ref 33.6–35)
MCV RBC AUTO: 91.8 FL (ref 81.4–97.8)
MONOCYTES # BLD AUTO: 0.38 K/UL (ref 0–0.85)
MONOCYTES NFR BLD AUTO: 8.9 % (ref 0–13.4)
NEUTROPHILS # BLD AUTO: 1.93 K/UL (ref 2–7.15)
NEUTROPHILS NFR BLD: 45.1 % (ref 44–72)
NRBC # BLD AUTO: 0 K/UL
NRBC BLD-RTO: 0 /100 WBC
PLATELET # BLD AUTO: 204 K/UL (ref 164–446)
PMV BLD AUTO: 10.8 FL (ref 9–12.9)
POTASSIUM SERPL-SCNC: 4.1 MMOL/L (ref 3.6–5.5)
PROT SERPL-MCNC: 6.9 G/DL (ref 6–8.2)
PROTHROMBIN TIME: 14 SEC (ref 12–14.6)
RBC # BLD AUTO: 4.88 M/UL (ref 4.2–5.4)
SODIUM SERPL-SCNC: 140 MMOL/L (ref 135–145)
TSH SERPL DL<=0.005 MIU/L-ACNC: 2.42 UIU/ML (ref 0.38–5.33)
WBC # BLD AUTO: 4.3 K/UL (ref 4.8–10.8)

## 2023-05-11 PROCEDURE — 84443 ASSAY THYROID STIM HORMONE: CPT

## 2023-05-11 PROCEDURE — 80053 COMPREHEN METABOLIC PANEL: CPT

## 2023-05-11 PROCEDURE — 85025 COMPLETE CBC W/AUTO DIFF WBC: CPT

## 2023-05-11 PROCEDURE — 85610 PROTHROMBIN TIME: CPT

## 2023-05-11 PROCEDURE — 93010 ELECTROCARDIOGRAM REPORT: CPT | Performed by: INTERNAL MEDICINE

## 2023-05-11 PROCEDURE — 36415 COLL VENOUS BLD VENIPUNCTURE: CPT

## 2023-05-11 PROCEDURE — 93005 ELECTROCARDIOGRAM TRACING: CPT

## 2023-05-15 ENCOUNTER — APPOINTMENT (OUTPATIENT)
Dept: CARDIOLOGY | Facility: MEDICAL CENTER | Age: 73
End: 2023-05-15
Attending: INTERNAL MEDICINE
Payer: MEDICARE

## 2023-05-15 ENCOUNTER — HOSPITAL ENCOUNTER (OUTPATIENT)
Facility: MEDICAL CENTER | Age: 73
End: 2023-05-15
Attending: INTERNAL MEDICINE | Admitting: INTERNAL MEDICINE
Payer: MEDICARE

## 2023-05-15 ENCOUNTER — ANESTHESIA EVENT (OUTPATIENT)
Dept: CARDIOLOGY | Facility: MEDICAL CENTER | Age: 73
End: 2023-05-15
Payer: MEDICARE

## 2023-05-15 ENCOUNTER — ANESTHESIA (OUTPATIENT)
Dept: CARDIOLOGY | Facility: MEDICAL CENTER | Age: 73
End: 2023-05-15
Payer: MEDICARE

## 2023-05-15 VITALS
OXYGEN SATURATION: 96 % | DIASTOLIC BLOOD PRESSURE: 59 MMHG | TEMPERATURE: 98 F | HEIGHT: 63 IN | SYSTOLIC BLOOD PRESSURE: 111 MMHG | HEART RATE: 67 BPM | BODY MASS INDEX: 26.91 KG/M2 | WEIGHT: 151.9 LBS | RESPIRATION RATE: 16 BRPM

## 2023-05-15 DIAGNOSIS — I48.0 PAF (PAROXYSMAL ATRIAL FIBRILLATION) (HCC): ICD-10-CM

## 2023-05-15 LAB
ACT BLD: 245 SEC (ref 74–137)
ACT BLD: 287 SEC (ref 74–137)
ACT BLD: 305 SEC (ref 74–137)
EKG IMPRESSION: NORMAL
INR PPP: 1.03 (ref 0.87–1.13)
LV EJECT FRACT  99904: 60
PROTHROMBIN TIME: 13.4 SEC (ref 12–14.6)

## 2023-05-15 PROCEDURE — 85347 COAGULATION TIME ACTIVATED: CPT | Mod: 91

## 2023-05-15 PROCEDURE — 93010 ELECTROCARDIOGRAM REPORT: CPT | Mod: 59 | Performed by: INTERNAL MEDICINE

## 2023-05-15 PROCEDURE — 700102 HCHG RX REV CODE 250 W/ 637 OVERRIDE(OP): Performed by: INTERNAL MEDICINE

## 2023-05-15 PROCEDURE — 700105 HCHG RX REV CODE 258: Performed by: ANESTHESIOLOGY

## 2023-05-15 PROCEDURE — C1730 CATH, EP, 19 OR FEW ELECT: HCPCS

## 2023-05-15 PROCEDURE — 85610 PROTHROMBIN TIME: CPT

## 2023-05-15 PROCEDURE — 00537 ANES CARDIAC EP PROCEDURES: CPT | Performed by: ANESTHESIOLOGY

## 2023-05-15 PROCEDURE — 160036 HCHG PACU - EA ADDL 30 MINS PHASE I

## 2023-05-15 PROCEDURE — 93312 ECHO TRANSESOPHAGEAL: CPT | Mod: 26,59 | Performed by: ANESTHESIOLOGY

## 2023-05-15 PROCEDURE — A9270 NON-COVERED ITEM OR SERVICE: HCPCS | Performed by: INTERNAL MEDICINE

## 2023-05-15 PROCEDURE — 93656 COMPRE EP EVAL ABLTJ ATR FIB: CPT | Performed by: INTERNAL MEDICINE

## 2023-05-15 PROCEDURE — 700111 HCHG RX REV CODE 636 W/ 250 OVERRIDE (IP): Performed by: ANESTHESIOLOGY

## 2023-05-15 PROCEDURE — 93325 DOPPLER ECHO COLOR FLOW MAPG: CPT

## 2023-05-15 PROCEDURE — 700111 HCHG RX REV CODE 636 W/ 250 OVERRIDE (IP)

## 2023-05-15 PROCEDURE — 160046 HCHG PACU - 1ST 60 MINS PHASE II

## 2023-05-15 PROCEDURE — 700101 HCHG RX REV CODE 250

## 2023-05-15 PROCEDURE — 93655 ICAR CATH ABLTJ DSCRT ARRHYT: CPT | Performed by: INTERNAL MEDICINE

## 2023-05-15 PROCEDURE — 160035 HCHG PACU - 1ST 60 MINS PHASE I

## 2023-05-15 PROCEDURE — 93320 DOPPLER ECHO COMPLETE: CPT | Mod: 26 | Performed by: ANESTHESIOLOGY

## 2023-05-15 PROCEDURE — 36415 COLL VENOUS BLD VENIPUNCTURE: CPT

## 2023-05-15 PROCEDURE — 700101 HCHG RX REV CODE 250: Performed by: ANESTHESIOLOGY

## 2023-05-15 PROCEDURE — 160002 HCHG RECOVERY MINUTES (STAT)

## 2023-05-15 PROCEDURE — 93005 ELECTROCARDIOGRAM TRACING: CPT | Performed by: INTERNAL MEDICINE

## 2023-05-15 PROCEDURE — 99100 ANES PT EXTEME AGE<1 YR&>70: CPT | Performed by: ANESTHESIOLOGY

## 2023-05-15 PROCEDURE — 93325 DOPPLER ECHO COLOR FLOW MAPG: CPT | Mod: 26 | Performed by: ANESTHESIOLOGY

## 2023-05-15 PROCEDURE — 93623 PRGRMD STIMJ&PACG IV RX NFS: CPT

## 2023-05-15 PROCEDURE — 36620 INSERTION CATHETER ARTERY: CPT | Performed by: ANESTHESIOLOGY

## 2023-05-15 RX ORDER — PROTAMINE SULFATE 10 MG/ML
INJECTION, SOLUTION INTRAVENOUS
Status: COMPLETED
Start: 2023-05-15 | End: 2023-05-15

## 2023-05-15 RX ORDER — LIDOCAINE HYDROCHLORIDE 20 MG/ML
INJECTION, SOLUTION EPIDURAL; INFILTRATION; INTRACAUDAL; PERINEURAL PRN
Status: DISCONTINUED | OUTPATIENT
Start: 2023-05-15 | End: 2023-05-15 | Stop reason: SURG

## 2023-05-15 RX ORDER — HYDROMORPHONE HYDROCHLORIDE 1 MG/ML
0.1 INJECTION, SOLUTION INTRAMUSCULAR; INTRAVENOUS; SUBCUTANEOUS
Status: DISCONTINUED | OUTPATIENT
Start: 2023-05-15 | End: 2023-05-15 | Stop reason: HOSPADM

## 2023-05-15 RX ORDER — OMEPRAZOLE 20 MG/1
20 CAPSULE, DELAYED RELEASE ORAL
Status: DISCONTINUED | OUTPATIENT
Start: 2023-05-15 | End: 2023-05-15 | Stop reason: HOSPADM

## 2023-05-15 RX ORDER — HEPARIN SODIUM 200 [USP'U]/100ML
INJECTION, SOLUTION INTRAVENOUS
Status: COMPLETED
Start: 2023-05-15 | End: 2023-05-15

## 2023-05-15 RX ORDER — SODIUM CHLORIDE, SODIUM LACTATE, POTASSIUM CHLORIDE, CALCIUM CHLORIDE 600; 310; 30; 20 MG/100ML; MG/100ML; MG/100ML; MG/100ML
INJECTION, SOLUTION INTRAVENOUS CONTINUOUS
Status: DISCONTINUED | OUTPATIENT
Start: 2023-05-15 | End: 2023-05-15 | Stop reason: HOSPADM

## 2023-05-15 RX ORDER — HYDROMORPHONE HYDROCHLORIDE 1 MG/ML
0.2 INJECTION, SOLUTION INTRAMUSCULAR; INTRAVENOUS; SUBCUTANEOUS
Status: DISCONTINUED | OUTPATIENT
Start: 2023-05-15 | End: 2023-05-15 | Stop reason: HOSPADM

## 2023-05-15 RX ORDER — HEPARIN SODIUM 1000 [USP'U]/ML
INJECTION, SOLUTION INTRAVENOUS; SUBCUTANEOUS
Status: COMPLETED
Start: 2023-05-15 | End: 2023-05-15

## 2023-05-15 RX ORDER — ONDANSETRON 2 MG/ML
INJECTION INTRAMUSCULAR; INTRAVENOUS PRN
Status: DISCONTINUED | OUTPATIENT
Start: 2023-05-15 | End: 2023-05-15 | Stop reason: SURG

## 2023-05-15 RX ORDER — HYDRALAZINE HYDROCHLORIDE 20 MG/ML
5 INJECTION INTRAMUSCULAR; INTRAVENOUS
Status: DISCONTINUED | OUTPATIENT
Start: 2023-05-15 | End: 2023-05-15 | Stop reason: HOSPADM

## 2023-05-15 RX ORDER — HALOPERIDOL 5 MG/ML
1 INJECTION INTRAMUSCULAR
Status: DISCONTINUED | OUTPATIENT
Start: 2023-05-15 | End: 2023-05-15 | Stop reason: HOSPADM

## 2023-05-15 RX ORDER — ONDANSETRON 2 MG/ML
4 INJECTION INTRAMUSCULAR; INTRAVENOUS
Status: DISCONTINUED | OUTPATIENT
Start: 2023-05-15 | End: 2023-05-15 | Stop reason: HOSPADM

## 2023-05-15 RX ORDER — LIDOCAINE HYDROCHLORIDE 40 MG/ML
SOLUTION TOPICAL
Status: COMPLETED
Start: 2023-05-15 | End: 2023-05-15

## 2023-05-15 RX ORDER — OMEPRAZOLE 20 MG/1
20 CAPSULE, DELAYED RELEASE ORAL DAILY
Qty: 30 CAPSULE | Refills: 3 | Status: SHIPPED
Start: 2023-05-15 | End: 2023-09-13

## 2023-05-15 RX ORDER — SODIUM CHLORIDE, SODIUM LACTATE, POTASSIUM CHLORIDE, CALCIUM CHLORIDE 600; 310; 30; 20 MG/100ML; MG/100ML; MG/100ML; MG/100ML
INJECTION, SOLUTION INTRAVENOUS
Status: DISCONTINUED | OUTPATIENT
Start: 2023-05-15 | End: 2023-05-15 | Stop reason: SURG

## 2023-05-15 RX ORDER — LIDOCAINE HYDROCHLORIDE 20 MG/ML
INJECTION, SOLUTION INFILTRATION; PERINEURAL
Status: COMPLETED
Start: 2023-05-15 | End: 2023-05-15

## 2023-05-15 RX ORDER — HYDROMORPHONE HYDROCHLORIDE 1 MG/ML
0.4 INJECTION, SOLUTION INTRAMUSCULAR; INTRAVENOUS; SUBCUTANEOUS
Status: DISCONTINUED | OUTPATIENT
Start: 2023-05-15 | End: 2023-05-15 | Stop reason: HOSPADM

## 2023-05-15 RX ORDER — DIPHENHYDRAMINE HYDROCHLORIDE 50 MG/ML
12.5 INJECTION INTRAMUSCULAR; INTRAVENOUS
Status: DISCONTINUED | OUTPATIENT
Start: 2023-05-15 | End: 2023-05-15 | Stop reason: HOSPADM

## 2023-05-15 RX ORDER — DEXAMETHASONE SODIUM PHOSPHATE 4 MG/ML
INJECTION, SOLUTION INTRA-ARTICULAR; INTRALESIONAL; INTRAMUSCULAR; INTRAVENOUS; SOFT TISSUE PRN
Status: DISCONTINUED | OUTPATIENT
Start: 2023-05-15 | End: 2023-05-15 | Stop reason: SURG

## 2023-05-15 RX ORDER — SODIUM CHLORIDE, SODIUM LACTATE, POTASSIUM CHLORIDE, CALCIUM CHLORIDE 600; 310; 30; 20 MG/100ML; MG/100ML; MG/100ML; MG/100ML
INJECTION, SOLUTION INTRAVENOUS CONTINUOUS
Status: ACTIVE | OUTPATIENT
Start: 2023-05-15 | End: 2023-05-15

## 2023-05-15 RX ORDER — ACETAMINOPHEN 325 MG/1
650 TABLET ORAL EVERY 4 HOURS PRN
Status: DISCONTINUED | OUTPATIENT
Start: 2023-05-15 | End: 2023-05-15 | Stop reason: HOSPADM

## 2023-05-15 RX ORDER — LIDOCAINE HYDROCHLORIDE 40 MG/ML
SOLUTION TOPICAL PRN
Status: DISCONTINUED | OUTPATIENT
Start: 2023-05-15 | End: 2023-05-15 | Stop reason: SURG

## 2023-05-15 RX ORDER — EPHEDRINE SULFATE 50 MG/ML
5 INJECTION, SOLUTION INTRAVENOUS
Status: DISCONTINUED | OUTPATIENT
Start: 2023-05-15 | End: 2023-05-15 | Stop reason: HOSPADM

## 2023-05-15 RX ADMIN — HEPARIN SODIUM: 1000 INJECTION, SOLUTION INTRAVENOUS; SUBCUTANEOUS at 07:53

## 2023-05-15 RX ADMIN — LIDOCAINE HYDROCHLORIDE 4 ML: 40 SOLUTION TOPICAL at 07:55

## 2023-05-15 RX ADMIN — ROCURONIUM BROMIDE 50 MG: 10 INJECTION, SOLUTION INTRAVENOUS at 07:55

## 2023-05-15 RX ADMIN — PROTAMINE SULFATE 40 MG: 10 INJECTION, SOLUTION INTRAVENOUS at 10:13

## 2023-05-15 RX ADMIN — LIDOCAINE HYDROCHLORIDE: 20 INJECTION, SOLUTION INFILTRATION; PERINEURAL at 07:53

## 2023-05-15 RX ADMIN — HEPARIN SODIUM 4000 UNITS: 200 INJECTION, SOLUTION INTRAVENOUS at 07:53

## 2023-05-15 RX ADMIN — PROPOFOL 100 MG: 10 INJECTION, EMULSION INTRAVENOUS at 09:35

## 2023-05-15 RX ADMIN — ONDANSETRON 4 MG: 2 INJECTION INTRAMUSCULAR; INTRAVENOUS at 10:11

## 2023-05-15 RX ADMIN — PROPOFOL 100 MG: 10 INJECTION, EMULSION INTRAVENOUS at 10:06

## 2023-05-15 RX ADMIN — SUGAMMADEX 200 MG: 100 INJECTION, SOLUTION INTRAVENOUS at 10:11

## 2023-05-15 RX ADMIN — PROPOFOL 150 MG: 10 INJECTION, EMULSION INTRAVENOUS at 07:55

## 2023-05-15 RX ADMIN — ACETAMINOPHEN 650 MG: 325 TABLET, FILM COATED ORAL at 11:05

## 2023-05-15 RX ADMIN — ROCURONIUM BROMIDE 50 MG: 10 INJECTION, SOLUTION INTRAVENOUS at 09:03

## 2023-05-15 RX ADMIN — SODIUM CHLORIDE, POTASSIUM CHLORIDE, SODIUM LACTATE AND CALCIUM CHLORIDE: 600; 310; 30; 20 INJECTION, SOLUTION INTRAVENOUS at 07:47

## 2023-05-15 RX ADMIN — DEXAMETHASONE SODIUM PHOSPHATE 4 MG: 4 INJECTION INTRA-ARTICULAR; INTRALESIONAL; INTRAMUSCULAR; INTRAVENOUS; SOFT TISSUE at 08:14

## 2023-05-15 RX ADMIN — LIDOCAINE HYDROCHLORIDE 60 MG: 20 INJECTION, SOLUTION EPIDURAL; INFILTRATION; INTRACAUDAL at 07:55

## 2023-05-15 ASSESSMENT — PAIN SCALES - GENERAL: PAIN_LEVEL: 0

## 2023-05-15 ASSESSMENT — FIBROSIS 4 INDEX: FIB4 SCORE: 1.57

## 2023-05-15 ASSESSMENT — PAIN DESCRIPTION - PAIN TYPE
TYPE: ACUTE PAIN
TYPE: ACUTE PAIN

## 2023-05-15 NOTE — H&P
Physician H&P    Patient ID:  Edilma Taylor  [id number]  72 y.o. female  1950    History:  Primary Diagnosis: PAFand probable 1C flutter despite antiarrhythmics    HPI:  Document on Apple Watch.  Incomplete control on flecainide.  On Eliquis.  For catheter ablation.    Past Medical History:  has a past medical history of Anesthesia, Arrhythmia, Arthritis, Cancer (HCC) (1999), Cataract, Delayed emergence from general anesthesia, Pain, Sensitive to concentration of IV drug, Thyroid activity decreased, Transfusion history (1974), and Unspecified hemorrhagic conditions (1980s).  Past Surgical History:  has a past surgical history that includes ptosis repair (03/31/2015); dilation and curettage; bunionectomy (Bilateral, 1988); cataract extraction with iol (Bilateral, 2016); hysterectomy, total abdominal (1992); appendectomy (1992); carpal tunnel release (Bilateral, 1991); knee arthroscopy (Right, 12/28/2017); knee arthroscopy (Right, 12/28/2017); knee arthroplasty total (Right, 06/27/2018); pr tenotomy perc toe multiple tendons (Bilateral, 03/06/2023); pr repair of hammertoe,one (Bilateral, 03/06/2023); hand surgery (12/2011); orif, knee (06/2018); and foot surgery (2012, 3/2023).  Past Social History:  reports that she has never smoked. She has been exposed to tobacco smoke. She has never used smokeless tobacco. She reports current alcohol use of about 0.6 oz of alcohol per week. She reports that she does not use drugs.  Past Family History: History reviewed. No pertinent family history.  Allergies: Kenalog, Penicillins, Doxycycline, Morphine, Sulfa drugs, and Gabapentin    Current Medications:  Prior to Admission medications    Medication Sig Start Date End Date Taking? Authorizing Provider   hydrocortisone 2.5 % Cream topical cream PLEASE SEE ATTACHED FOR DETAILED DIRECTIONS  Patient not taking: Reported on 5/15/2023 1/29/23   Physician Outpatient   hydrocortisone 2.5 % Cream topical cream as needed.     "Physician Outpatient   flecainide (TAMBOCOR) 150 MG Tab Take 0.5 Tablets by mouth 2 times a day.  Patient not taking: Reported on 5/15/2023 1/17/23   Donell Flaherty M.D.   apixaban (ELIQUIS) 5mg Tab Take 1 Tablet by mouth 2 times a day. 8/22/22   Physician Outpatient   flecainide (TAMBOCOR) 50 MG tablet Take 1 Tablet by mouth 2 times a day.  Patient taking differently: Take 50 mg by mouth every evening. 10/31/22   Donell Flaherty M.D.   levothyroxine (SYNTHROID) 50 MCG Tab 1 TABLET IN THE MORNING ON AN EMPTY STOMACH ORALLY ONCE A DAY 5/26/22   Physician Outpatient   metoprolol SR (TOPROL XL) 25 MG TABLET SR 24 HR Take 12.5 mg by mouth every evening. 6/2/20   Physician Outpatient   ESTRADIOL PO 0.5 mg every evening.    Physician Outpatient   montelukast (SINGULAIR) 10 MG Tab TAKE 1 TABLET (10 MG) BY MOUTH NIGHTLY AT 9 PM 6/7/22   Physician Outpatient   Nutritional Supplements (JOINT FORMULA PO) Take 1 Tab by mouth every day.    Physician Outpatient   ascorbic acid (ASCORBIC ACID) 500 MG TABS Take 1,000 mg by mouth every day.    Physician Outpatient   therapeutic multivitamin-minerals (THERAGRAN-M) TABS Take 1 Tab by mouth every day.    Physician Outpatient   Non Formulary Request Take 1 Cap by mouth every day. EYE VITAMIN    Physician Outpatient   Probiotic Product (PROBIOTIC DAILY PO) Take 1 Cap by mouth every day.    Physician Outpatient   Non Formulary Request Take 1 Cap by mouth every day. VITAMIN D + CALCIUM    Physician Outpatient       Review of Systems:  ROS  /62   Pulse (!) 56   Temp 36.3 °C (97.4 °F) (Temporal)   Resp 14   Ht 1.6 m (5' 3\")   Wt 68.9 kg (151 lb 14.4 oz)   SpO2 99%     Physical Examination:  Physical Exam  Vitals reviewed.   Constitutional:       General: She is not in acute distress.     Appearance: She is well-developed. She is not diaphoretic.   Eyes:      Conjunctiva/sclera: Conjunctivae normal.   Neck:      Thyroid: No thyroid mass or thyromegaly.      Vascular: No JVD.      " Trachea: No tracheal deviation.   Cardiovascular:      Rate and Rhythm: Normal rate and regular rhythm.      Heart sounds: No murmur heard.  Pulmonary:      Effort: Pulmonary effort is normal. No respiratory distress.      Breath sounds: Normal breath sounds.   Chest:      Chest wall: No tenderness.   Abdominal:      Palpations: Abdomen is soft.      Tenderness: There is no abdominal tenderness.   Musculoskeletal:         General: Normal range of motion.   Skin:     General: Skin is warm and dry.   Neurological:      Mental Status: She is alert and oriented to person, place, and time.      Motor: No tremor.   Psychiatric:         Behavior: Behavior normal.         Impression:  1.  Paroxysmal atrial fibrillation for catheter ablation.  The risks, benefits,and alternatives to atrial fibrillation ablation with general anesthesia were discussed in great detail. Specific risks mentioned including bleeding, infection, arteriovenous fistula/pseudoaneurysm related to sheath placement, cardiac perforation with possible tamponade requiring pericardiocentesis or possible open heart surgery were discussed. In addition,we discussed atrial fibrillation ablation specific complications including pulmonary vein stenosis, left atrial perforation (2-4%), and nerve damage involving the recurrent laryngeal nerve, the vagus nerve with resulting gastroparesis, and the phrenic nerve.  Also mentioned was a 1/400 (0.25%) occurrence of atrial esophageal fistula, which is an abnormal communication between the esophagus and the left atrium; this complication is often fatal. Lastly the risks of death, myocardial infarction, stroke, DVT and PE were discussed. The patient verbalized understanding of these potential complications and wishes to proceed with this procedure.  The risks, benefits, and alternatives to transesophageal echocardiogram with IV sedation were discussed with the patient in specific detail, including oropharyngeal and  esophageal traumas including hoarseness and dysphagia after the procedure. Rare cases demonstrating serious or fatal complications associated with transesophageal echocardiogram have been reported in the adult population, including cardiac, pulmonary and bleeding complications in less than 1% of people. Patients with an identified intracardiac thrombus are at increased risk for embolic events and this appears to be reduced with anticoagulant therapy. The patient verbalized understandings about these  possible complications and wishes to proceed with this procedure

## 2023-05-15 NOTE — ANESTHESIA PREPROCEDURE EVALUATION
Date/Time: 05/15/23 0800    Scheduled providers: Donell Flaherty M.D.    Procedure: CL-EP ABLATION ATRIAL FIBRILLATION    Diagnosis:       PAF (paroxysmal atrial fibrillation) ([facility]) [I48.0]      Paroxysmal atrial fibrillation [I48.0]    Indications: See Associated Dx    Location: [facility] IMAGING - CATH LAB [facility]          Relevant Problems   CARDIAC   (positive) PAF (paroxysmal atrial fibrillation) ([facility])       Physical Exam    Airway   Mallampati: II  TM distance: >3 FB  Neck ROM: limited       Cardiovascular   Rhythm: regular  Rate: normal     Dental - normal exam           Pulmonary - normal exam  Breath sounds clear to auscultation     Abdominal   (-) obese     Neurological - normal exam                 Anesthesia Plan    ASA 2       Plan - general       Airway plan will be ETT  GEORGE Planned        Induction: intravenous      Pertinent diagnostic labs and testing reviewed    Informed Consent:    Anesthetic plan and risks discussed with patient.    Use of blood products discussed with: patient whom consented to blood products.

## 2023-05-15 NOTE — ANESTHESIA TIME REPORT
Anesthesia Start and Stop Event Times     Date Time Event    5/15/2023 0731 Ready for Procedure     0747 Anesthesia Start     1034 Anesthesia Stop        Responsible Staff  05/15/23    Name Role Begin End    Jackson Sweet M.D. Anesth 0747 1034        Overtime Reason:  no overtime (within assigned shift)    Comments:

## 2023-05-15 NOTE — DISCHARGE INSTRUCTIONS
[facility] Post Ablation Patient Instructions:  No lifting > 10 lbs x 1 week.      No soaking in baths, hot tubs, pools x 1 week.  May shower the day after discharge and take off groin dressings and leave  sites uncovered.  Continue to monitor sites daily for warmth, redness, discolored drainage.  It is common to have a small lump in the area where the cather was (usually the size of a marble); this will go away but takes approximately 6 weeks to normalize.     3.   Please take all medications as prescribed to you; please do not stop any medications prescribed post ablation unless directed by your healthcare provider.      4.   Please do not miss any doses of your blood thinner (if you have been started on, or take chronic blood thinners) without discussion with your healthcare provider first.     5.   Please walk and take deep breaths after discharge.  After discharge, if you experience neurological changes/signs of stroke or high fever you should be seen in the emergency dept.     6.   It is possible you may experience some chest discomfort or chest tightness post ablation.  This is usually secondary to inflammation and irritation of the tissues at the area of the ablation.  If this occurs, it is advised to try 400 mg of Ibuprofen with food as needed up to three times a day for a maximum of two days.  This should help to decrease pain and tissue inflammation.          **Please notify the office ([phone]) if this occurs.         ** DO NOT TAKE Ibuprofen IF HISTORY OF ALLERGY, SIGNIFICANT BLEEDING OR KIDNEY DISEASE WITHOUT DISCUSSING WITH YOUR CARDIOLOGY PROVIDER FIRST.          ** If pain becomes severe or you have additional symptoms you may need to be medically evaluated; please contact the cardiology office ([phone]) for further direction.     7. It is possible that you may experience arrhythmia/Atrial Fibrillation post ablation.  This is secondary to irritation and  inflammation of the cardiac tissues from the ablation.  If you have atrial fibrillation all day or feel poorly with it, please notify your cardiologist's via phone ([phone]) or [facility].      8.  Please contact call our office ([phone]) or message via [employer] message if you have any questions or concerns post procedurally.    9. You need to be seen for post ablation follow up 3-4 weeks post procedure. An appointment is scheduled for you.  Please contact the office ([phone]) if you need to change your appointment.      HOME CARE INSTRUCTIONS    ACTIVITY: Rest and take it easy for the first 24 hours.  A responsible adult is recommended to remain with you during that time.  It is normal to feel sleepy.  We encourage you to not do anything that requires balance, judgment or coordination.    FOR 24 HOURS DO NOT:  Drive, operate machinery or run household appliances.  Drink beer or alcoholic beverages.  Make important decisions or sign legal documents.    DIET: To avoid nausea, slowly advance diet as tolerated, avoiding spicy or greasy foods for the first day.  Add more substantial food to your diet according to your physician's instructions.  Babies can be fed formula or breast milk as soon as they are hungry.  INCREASE FLUIDS AND FIBER TO AVOID CONSTIPATION.    MEDICATIONS: Resume taking daily medication.  Take prescribed pain medication with food.  If no medication is prescribed, you may take non-aspirin pain medication if needed.  PAIN MEDICATION CAN BE VERY CONSTIPATING.  Take a stool softener or laxative such as senokot, pericolace, or milk of magnesia if needed.    Prescription given for Prilosec.     A follow-up appointment should be arranged with your doctor in 1-2 weeks; call to schedule.    You should CALL YOUR PHYSICIAN if you develop:  Fever greater than 101 degrees F.  Pain not relieved by medication, or persistent nausea or vomiting.  Excessive bleeding (blood soaking through dressing) or  unexpected drainage from the wound.  Extreme redness or swelling around the incision site, drainage of pus or foul smelling drainage.  Inability to urinate or empty your bladder within 8 hours.  Problems with breathing or chest pain.    You should call 911 if you develop problems with breathing or chest pain.  If you are unable to contact your doctor or surgical center, you should go to the nearest emergency room or urgent care center.  Physician's telephone #: [phone]    MILD FLU-LIKE SYMPTOMS ARE NORMAL.  YOU MAY EXPERIENCE GENERALIZED MUSCLE ACHES, THROAT IRRITATION, HEADACHE AND/OR SOME NAUSEA.    If any questions arise, call your doctor.  If your doctor is not available, please feel free to call the Surgical Center at [phone].  The Center is open Monday through Friday from 7AM to 7PM.      A registered nurse may call you a few days after your surgery to see how you are doing after your procedure.    You may also receive a survey in the mail within the next two weeks and we ask that you take a few moments to complete the survey and return it to us.  Our goal is to provide you with very good care and we value your comments.     Depression / Suicide Risk    As you are discharged from this [facility] facility, it is important to learn how to keep safe from harming yourself.    Recognize the warning signs:  Abrupt changes in personality, positive or negative- including increase in energy   Giving away possessions  Change in eating patterns- significant weight changes-  positive or negative  Change in sleeping patterns- unable to sleep or sleeping all the time   Unwillingness or inability to communicate  Depression  Unusual sadness, discouragement and loneliness  Talk of wanting to die  Neglect of personal appearance   Rebelliousness- reckless behavior  Withdrawal from people/activities they love  Confusion- inability to concentrate     If you or a loved one observes any of these behaviors or has concerns  about self-harm, here's what you can do:  Talk about it- your feelings and reasons for harming yourself  Remove any means that you might use to hurt yourself (examples: pills, rope, extension cords, firearm)  Get professional help from the community (Mental Health, Substance Abuse, psychological counseling)  Do not be alone:Call your Safe Contact- someone whom you trust who will be there for you.  Call your local CRISIS HOTLINE 596-3307 or 878-129-5632  Call your local Children's Mobile Crisis Response Team [place] (685) 937-1243 or www.[employer]  Call the toll free National Suicide Prevention Hotlines   National Suicide Prevention Lifeline 352-895-PWMG (5416)  National Hope Line Network 800-SUICIDE (276-7050)    I acknowledge receipt and understanding of these Home Care instructions.

## 2023-05-15 NOTE — ANESTHESIA POSTPROCEDURE EVALUATION
Patient: Edilma Taylor    Procedure Summary     Date: 05/15/23 Room / Location: [facility] IMAGING - CATH LAB [facility]    Anesthesia Start: 0747 Anesthesia Stop: 1034    Procedure: CL-EP ABLATION ATRIAL FIBRILLATION Diagnosis:       PAF (paroxysmal atrial fibrillation) (HCC)      Paroxysmal atrial fibrillation      (See Associated Dx)    Scheduled Providers: Donell Flaherty M.D.; Jackson Sweet M.D. Responsible Provider: Jackson Sweet M.D.    Anesthesia Type: general ASA Status: 2          Final Anesthesia Type: general  Last vitals  BP   Blood Pressure : 125/62    Temp   36.3 °C (97.4 °F)    Pulse   (!) 56   Resp   14    SpO2   99 %      Anesthesia Post Evaluation    Patient location during evaluation: PACU  Patient participation: complete - patient participated  Level of consciousness: awake  Pain score: 0    Airway patency: patent  Anesthetic complications: no  Cardiovascular status: adequate  Respiratory status: acceptable  Hydration status: stable    PONV: controlled          No notable events documented.     Nurse Pain Score: 1 (NPRS)

## 2023-05-15 NOTE — ANESTHESIA PROCEDURE NOTES
Airway    Date/Time: 5/15/2023 7:55 AM    Performed by: Jackson Sweet M.D.  Authorized by: Jackson Sweet M.D.    Location:  OR  Urgency:  Elective  Difficult Airway: No    Indications for Airway Management:  Anesthesia      Spontaneous Ventilation: absent    Sedation Level:  Deep  Preoxygenated: Yes    Patient Position:  Sniffing  Mask Difficulty Assessment:  1 - vent by mask  Final Airway Type:  Endotracheal airway  Final Endotracheal Airway:  ETT  Cuffed: Yes    Technique Used for Successful ETT Placement:  Video laryngoscopy  Devices/Methods Used in Placement:  Intubating stylet    Insertion Site:  Oral  Blade Type:  Glide  Laryngoscope Blade/Videolaryngoscope Blade Size:  3  ETT Size (mm):  6.5  Measured from:  Teeth  ETT to Teeth (cm):  20  Placement Verified by: auscultation and capnometry    Cormack-Lehane Classification:  Grade I - full view of glottis  Number of Attempts at Approach:  1  Ventilation Between Attempts:  BVM  Number of Other Approaches Attempted:  1  Unsuccessful Approach(es) for ETT:  Direct laryngoscopy   Limited neck mobility and small mouth opening, Grade III view with BURP and Anderson 2 blade.  Did not attempt to intubate given poor view and called for glidescope.  Easy mask ventilation, easy glidescope.

## 2023-05-15 NOTE — OR NURSING
Pt ambulated to bathroom. No bleeding present at bilateral groin sites. Bilateral groin sites are soft with pulses present. Report given to phase 2 RN.

## 2023-05-15 NOTE — CATH LAB
Electrophysiology Procedure Note  [facility]    Indication: Atrial fibrillation and 1C atrial flutter    Procedure Performed: 1. Atrial fibrillation ablation 2. Advanced mapping using CARTO system  3. Transesophageal echocardiography 4. Intracardiac echocardiography 5. Esophageal temperature monitoring 6. Intraarterial blood pressure monitoring 7. Frequent ACT's 8. Additional ablation secondary arrhythmia 9. Programmed stimuli     Physician(s): ANEESH Flaherty M.D.     Resident/Assistant(s): None     Anesthesia: General endotracheal anesthetic.    Anaesthesiologist: Dr. Jackson Sweet    Specimen(s) Removed: None     Estimated Blood Loss:  30cc     Complications:  None    Pre-procedure ECG: Sinus rhythm     Post Procedure ECG: Sinus rhythm    Description of Procedure:   After informed written consent, the patient was brought to the EP lab in the fasting, non-sedated state. The patient was prepped and draped in the usual sterile fashion.  GEORGE showed no evidence of LA clot. Arterial line placed for monitoring. Esophageal temperature probe placed and monitored.  Femoral venous access was obtained using the modified Seldinger technique. In the right femoral vein, 3 sheaths (8,8, 6 Fr) were inserted over 0.35” guidewires. In the left femoral vein,  1 sheath (8 Fr) was inserted over 0.35” guidewires. A deflectable decapolar catheter was advanced to the CS position.An intracardiac echo (ICE) catheter was advanced to the right atrium. ICE was used to identify the atrial septum, left atrial appendage, and pulmonary veins and dudley the anatomic structures to superimpose on our 3D electroanatomic map using CARTOSound. During the procedure, ICE was utilized to localize the ablation catheter, monitor for thrombus formation, and to exclude pericardial effusion. Intravenous heparin was administered to maintain -350 seconds. Double transseptal left heart catheterization was performed under intracardiac echo and  hemodynamic guidance. A [place] needle was inserted into the two 8 Fr sheaths (TorFlex)for transseptal puncture and placement of sheaths in the left atrium. One sheath was switched to a Vizigo sheath. Advanced mapping of the pulmonary veins and left atrium was performed using CARTO3 FAM  and a deflectable multipolar mapping catheter (BiosFUZE Fit For A Kid! OctaRay) using the CARTO system. Wide antral circumferential ablation was performed using an 3.5 mm deflectable irrigated force contact catheter (Biosense ST/SF D/F) at primarily 25 W (posteriorly) to 40 W ( anteriorly)  power starting from the L sided veins and then proceeding along to the RSPV and then finally the RIPV. Bidirectional pulmonary vein antrum isolation was verified at the end of the procedure by pacing inside the vein and confirming exit block along with absence of local PV signals on the OctaRay.   A Halo was advanced into the right atrium for mapping of the right atrial isthmus. A HIS cloud was performed. 8Fr saline irrigated ablation catheter with 3.5mm distal electrode and location sensor for the CARTO system (Biosense ST/ SF D/F) was used for RFA of right atrial isthmus at 40 camarena. During ablation while CS pacing we saw bilateral isthmus block. Pacing on both sides of the isthmus confirmed block.  Programmed stimulation showed noninducibility.    At the end of the procedure, heparin was reversed with protamine, the catheter and sheaths were removed, and hemostasis was achieved with Vascade. Following recovery from anesthesia, the patient was transferred to recovery.      Total ablation time: 1671 seconds    Fluoroscopy time: 9.8 min, 24 mGy      Electrophysiologic Findings:    1.  Sinus rhythm   743 ms,  ms, HV 48 ms.  2.  Pulmonary vein isolation.  3.  Right atrial isthmus ablation.  4.  Noninducible post ablation    Impressions:    1. Paroxysmal atrial fibrillation.    2. Successful RF ablation pulmonary vein isolation procedure.  3. Right atrial  isthmus ablation with block.        Recommendations:  1. Resume anticoagulation.  2. Monitored bedrest.

## 2023-05-15 NOTE — PROGRESS NOTES
Seen in afternoon same day discharge EP rounds.  S/P Afib ablation with PVI and RA isthmus with bidirectional block by Dr Flaherty.        Conclusions per Op Note dated 5/15/23:  Total ablation time: 1671 seconds  Fluoroscopy time: 9.8 min, 24 mGy   Electrophysiologic Findings:    1.  Sinus rhythm   743 ms,  ms, HV 48 ms.  2.  Pulmonary vein isolation.  3.  Right atrial isthmus ablation.  4.  Noninducible post ablation  Impressions:    1. Paroxysmal atrial fibrillation.    2. Successful RF ablation pulmonary vein isolation procedure.  3. Right atrial isthmus ablation with block.       Monitored rhythm has been sinus during monitored recovery.  Vital signs are stable. Bilateral femoral access sites are C/D/I.  There is no evidence of hematoma, no significant ecchymosis or pain. She has ambulated without difficulty.      I have verified that new discharge prescriptions have been sent to correct pharmacy, patient has active anticoagulation prescription, and provided education about importance of esophageal prophylaxis post ablation.      Discharge instructions discussed with patient:  [facility] Post Ablation Patient Instructions:  No lifting > 10 lbs x 1 week.    No soaking in baths, hot tubs, pools x 1 week.  May shower the day after discharge and take off groin dressings and leave uncovered.  Continue to monitor sites daily for warmth, redness, discolored drainage.  It is common to have a small lump in the area where the cather was (usually the size of a small marble); this will go away but takes approximately 6 weeks to normalize.   3.   Please take all medications as prescribed to you; please do not stop any medications prescribed post ablation unless directed by your healthcare provider.    4.   Please do not miss any doses of your blood thinner (if you have been started on, or take chronic blood thinners) without discussion with your healthcare provider first.   5.   Please  walk and take deep breaths after discharge.  After discharge, if  experiences neurological changes/signs of stroke, high fever, you should be seen in the emergency dept.   6.   It is possible you may experience some chest discomfort or chest tightness post ablation.  This is usually secondary to inflammation and irritation of the tissues at the area of the ablation.  If this occurs, it is advised to try 400 mg of Ibuprofen with food as needed up to three times a day for a maximum of two days.  This should help to decrease pain and tissue inflammation.          **Please notify the office ([phone]) if this occurs.         ** DO NOT TAKE Ibuprofen IF HISTORY OF SIGNIFICANT BLEEDING OR KIDNEY DISEASE WITHOUT DISCUSSING WITH YOUR CARDIOLOGY PROVIDER FIRST.          ** If pain becomes severe or you have additional symptoms you may need to be medically evaluated; please contact the cardiology office ([phone]) for further direction.   7. It is possible that you may experience arrhythmia/Atrial Fibrillation post ablation.  This is secondary to irritation and inflammation of the cardiac tissues from the ablation.  If you have atrial fibrillation all day or feel poorly with it, please notify your cardiologist's via phone ([phone]) or [employer].    8.  Please contact call our office ([phone]) or message via [employer] message if you have any questions or concerns post procedurally.  9. You need to be seen for post ablation follow up 2-4 weeks post procedure.  If you do not have a follow up appointment scheduled, please call [phone] to schedule your follow up appointment.

## 2023-05-15 NOTE — ANESTHESIA PROCEDURE NOTES
GEORGE    Date/Time: 5/15/2023 8:10 AM    Performed by: Jackson Sweet M.D.  Authorized by: Jackson Sweet M.D.    Start Time:5/15/2023 8:10 AM  Preanesthetic Checklist: patient identified, IV checked, site marked, risks and benefits discussed, surgical consent, monitors and equipment checked, pre-op evaluation and timeout performed    Indication for GEORGE: diagnostic   Patient Location: OR  Intubated: Yes  Bite Block: Yes  Heart Visualized: Yes  Insertion: atraumatic    **See FULL GEORGE report in patient's chart via [employer] Syn[employer]**

## 2023-05-15 NOTE — OR NURSING
Patient from pacu, aaox4, vss. Up to chair. Bilateral groin sites are clean dry and intact, soft. Right wrist arterial site clean dry and intact. Plan of care discussed, family at bedside.

## 2023-05-15 NOTE — ANESTHESIA PROCEDURE NOTES
Arterial Line    Performed by: Jackson Sweet M.D.  Authorized by: Jackson Sweet M.D.    Start Time:  5/15/2023 8:11 AM  Localization: ultrasound guidance and surface landmarks  Image captured, interpreted and electronically stored.  Patient Location:  OR  Indication: continuous blood pressure monitoring        Catheter Size:  20 G  Seldinger Technique?: Yes    Laterality:  Right  Site:  Radial artery  Line Secured:  Antimicrobial disc, tape and transparent dressing  Events: patient tolerated procedure well with no complications

## 2023-05-30 ENCOUNTER — OFFICE VISIT (OUTPATIENT)
Dept: CARDIOLOGY | Facility: MEDICAL CENTER | Age: 73
End: 2023-05-30
Attending: NURSE PRACTITIONER
Payer: MEDICARE

## 2023-05-30 ENCOUNTER — PATIENT MESSAGE (OUTPATIENT)
Dept: CARDIOLOGY | Facility: MEDICAL CENTER | Age: 73
End: 2023-05-30

## 2023-05-30 VITALS
WEIGHT: 152 LBS | HEART RATE: 62 BPM | DIASTOLIC BLOOD PRESSURE: 66 MMHG | HEIGHT: 63 IN | SYSTOLIC BLOOD PRESSURE: 106 MMHG | RESPIRATION RATE: 14 BRPM | BODY MASS INDEX: 26.93 KG/M2 | OXYGEN SATURATION: 98 %

## 2023-05-30 DIAGNOSIS — I48.0 PAF (PAROXYSMAL ATRIAL FIBRILLATION) (HCC): ICD-10-CM

## 2023-05-30 DIAGNOSIS — Z79.01 ANTICOAGULATED: ICD-10-CM

## 2023-05-30 DIAGNOSIS — Z98.890 H/O CARDIAC RADIOFREQUENCY ABLATION: ICD-10-CM

## 2023-05-30 PROCEDURE — 99213 OFFICE O/P EST LOW 20 MIN: CPT | Performed by: NURSE PRACTITIONER

## 2023-05-30 PROCEDURE — 3078F DIAST BP <80 MM HG: CPT | Performed by: NURSE PRACTITIONER

## 2023-05-30 PROCEDURE — 93005 ELECTROCARDIOGRAM TRACING: CPT | Performed by: NURSE PRACTITIONER

## 2023-05-30 PROCEDURE — 99212 OFFICE O/P EST SF 10 MIN: CPT | Performed by: NURSE PRACTITIONER

## 2023-05-30 PROCEDURE — 3074F SYST BP LT 130 MM HG: CPT | Performed by: NURSE PRACTITIONER

## 2023-05-30 ASSESSMENT — ENCOUNTER SYMPTOMS
HEMOPTYSIS: 0
NAUSEA: 0
MUSCULOSKELETAL NEGATIVE: 1
SENSORY CHANGE: 0
FOCAL WEAKNESS: 0
PALPITATIONS: 0
FEVER: 0
HEADACHES: 0
LOSS OF CONSCIOUSNESS: 0
DIZZINESS: 0
COUGH: 0
HEARTBURN: 0
TREMORS: 0
TINGLING: 0
WEIGHT LOSS: 0
VOMITING: 0
PND: 0
WHEEZING: 0
ORTHOPNEA: 0
PSYCHIATRIC NEGATIVE: 1
SPUTUM PRODUCTION: 0
CHILLS: 0
SPEECH CHANGE: 0
SHORTNESS OF BREATH: 0

## 2023-05-30 ASSESSMENT — FIBROSIS 4 INDEX: FIB4 SCORE: 1.57

## 2023-05-30 NOTE — PROGRESS NOTES
"Chief Complaint   Patient presents with    Atrial Fibrillation     F/V Dx: PAF (paroxysmal atrial fibrillation) ([facility])       Subjective     Edilma Taylor is a 72 y.o. female who presents today for procedural follow-up after ablation of atrial fibrillation and 1C mediated atrial flutter by Dr. Donell Flaherty 5/15/2023.    Procedure completed with successful PVI and right atrial isthmus ablation with block.    She is followed by Dr. Flaherty for EP.  Additional history significant for recurrent episodes of atrial fibrillation despite antiarrhythmic medication use, chronically anticoagulated with Eliquis.      Today in follow-up she is accompanied by her .  She reports sore throat/trouble swallowing that she had initially post procedure has resolved.  In terms of her arrhythmia, she has had 1 episode of A-fib lasting approximately 1 to 2 minutes since her procedure.  She reports that she feels good without chest pain shortness of breath significant dizziness or presyncope.  She is back exercising and tolerating well.      Past Medical History:   Diagnosis Date    Anesthesia     WAKES UP SLOWLY     hypotensive episode 2015    Arrhythmia     Arthritis     osteoarthritis in joints \"knees especially\"    Cancer ([facility]) 1999    skin basal and squamous    Cataract     IOL bilateral    Delayed emergence from general anesthesia     Pain     knee pain    Sensitive to concentration of IV drug     and oral drugs    Thyroid activity decreased     takes suppliment    Transfusion history 1974    hemorrage after D&C    Unspecified hemorrhagic conditions 1980s    from taking too much aspirin     Past Surgical History:   Procedure Laterality Date    [place] TENOTOMY PERC TOE MULTIPLE TENDONS Bilateral 03/06/2023    Procedure: LEFT THIRD AND FOURTH TOE FLEXOR TENOTOMY, RIGHT THIRD, FOURTH AND FIFTH TOE FLEXOR TENOTOMY;  Surgeon: Noel Mansfield M.D.;  Location: [facility];  Service: Orthopedics    PB REPAIR OF " HAMMERTOE,ONE Bilateral 03/06/2023    Procedure: LEFT DISTAL INTERPHALANGEAL JOINT FUSION, RIGHT THIRD AND FOURTH DISTAL INTERPHALANGEAL JOINT FUSION,  RIGHT FIFTH PROXIMAL INTERPHALANGEAL FUSION, RIGHT DISTAL PHALANX EXOSECTOMY;  Surgeon: Noel Mansfield M.D.;  Location: [facility];  Service: Orthopedics    KNEE ARTHROPLASTY TOTAL Right 06/27/2018    Procedure: KNEE ARTHROPLASTY TOTAL;  Surgeon: Sindi Ochoa M.D.;  Location: SURGERY [facility];  Service: Orthopedics    KNEE ARTHROSCOPY Right 12/28/2017    x 2    KNEE ARTHROSCOPY Right 12/28/2017    Procedure: RT KNEE ARTHROSCOPY, Chondroplasty, Partial Meniscectomy;  Surgeon: Jaswant Velez M.D.;  Location: SURGERY [facility];  Service: Orthopedics    CATARACT EXTRACTION WITH IOL Bilateral 2016    PTOSIS REPAIR  03/31/2015    Performed by Alfredo Marcus M.D. at SURGERY SURG[facility]    HYSTERECTOMY, TOTAL ABDOMINAL  1992    APPENDECTOMY  1992    CARPAL TUNNEL RELEASE Bilateral 1991    BUNIONECTOMY Bilateral 1988    DILATION AND CURETTAGE      FOOT SURGERY  2012, 3/2023    [facility]    HAND SURGERY  12/2011    ORIF, KNEE  06/2018    Total knee replace     No family history on file.  Social History     Socioeconomic History    Marital status:      Spouse name: Not on file    Number of children: Not on file    Years of education: Not on file    Highest education level: Not on file   Occupational History    Not on file   Tobacco Use    Smoking status: Never     Passive exposure: Past    Smokeless tobacco: Never   Vaping Use    Vaping Use: Never used   Substance and Sexual Activity    Alcohol use: Yes     Alcohol/week: 0.6 oz     Types: 1 Glasses of wine per week     Comment: wine with some meals    Drug use: No    Sexual activity: Not on file   Other Topics Concern    Not on file   Social History Narrative    Not on file     Social Determinants of Health     Financial Resource Strain: Not on file   Food  "Insecurity: Not on file   Transportation Needs: Not on file   Physical Activity: Not on file   Stress: Not on file   Social Connections: Not on file   Intimate Partner Violence: Not on file   Housing Stability: Not on file     Allergies   Allergen Reactions    Kenalog Rash     INJECTION  CAUSES VERTIGO / THRUSH  Other reaction(s): Flushing    Penicillins Hives and Rash     HIVES    HIVES    Doxycycline Unspecified     \"Hurts to swallow\"  Other reaction(s): Other    Morphine Nausea and Vomiting     EXTREME VOMITING  Other reaction(s): GI Upset  EXTREME VOMITING    Sulfa Drugs      EXTREME MUCOUS PRODUCTION  Other reaction(s): Rhinitis  EXTREME MUCOUS PRODUCTION    Gabapentin Palpitations     Outpatient Encounter Medications as of 5/30/2023   Medication Sig Dispense Refill    omeprazole (PRILOSEC) 20 MG delayed-release capsule Take 1 Capsule by mouth every day. 30 Capsule 3    hydrocortisone 2.5 % Cream topical cream PLEASE SEE ATTACHED FOR DETAILED DIRECTIONS (Patient not taking: Reported on 5/15/2023)      apixaban (ELIQUIS) 5mg Tab Take 1 Tablet by mouth 2 times a day.      levothyroxine (SYNTHROID) 50 MCG Tab 1 TABLET IN THE MORNING ON AN EMPTY STOMACH ORALLY ONCE A DAY      metoprolol SR (TOPROL XL) 25 MG TABLET SR 24 HR Take 12.5 mg by mouth every evening.      ESTRADIOL PO 0.5 mg every evening.      montelukast (SINGULAIR) 10 MG Tab TAKE 1 TABLET (10 MG) BY MOUTH NIGHTLY AT 9 PM      Nutritional Supplements (JOINT FORMULA PO) Take 1 Tab by mouth every day.      ascorbic acid (ASCORBIC ACID) 500 MG TABS Take 1,000 mg by mouth every day.      therapeutic multivitamin-minerals (THERAGRAN-M) TABS Take 1 Tab by mouth every day.      Non Formulary Request Take 1 Cap by mouth every day. EYE VITAMIN      Probiotic Product (PROBIOTIC DAILY PO) Take 1 Cap by mouth every day.      Non Formulary Request Take 1 Cap by mouth every day. VITAMIN D + CALCIUM       No facility-administered encounter medications on file as of " "5/30/2023.     Review of Systems   Constitutional:  Negative for chills, fever, malaise/fatigue and weight loss.   HENT:  Negative for congestion and tinnitus.    Respiratory:  Negative for cough, hemoptysis, sputum production, shortness of breath and wheezing.    Cardiovascular:  Negative for chest pain, palpitations, orthopnea, leg swelling and PND.   Gastrointestinal:  Negative for heartburn, nausea and vomiting.   Genitourinary: Negative.    Musculoskeletal: Negative.    Neurological:  Negative for dizziness, tingling, tremors, sensory change, speech change, focal weakness, loss of consciousness and headaches.   Psychiatric/Behavioral: Negative.                Objective     /66 (BP Location: Left arm, Patient Position: Sitting, BP Cuff Size: Adult)   Pulse 62   Resp 14   Ht 1.6 m (5' 3\")   Wt 68.9 kg (152 lb)   SpO2 98%   BMI 26.93 kg/m²     Physical Exam  Vitals reviewed.   Constitutional:       Appearance: Normal appearance.   HENT:      Head: Normocephalic and atraumatic.      Mouth/Throat:      Mouth: Mucous membranes are moist.      Pharynx: Oropharynx is clear.   Eyes:      Extraocular Movements: Extraocular movements intact.      Conjunctiva/sclera: Conjunctivae normal.      Pupils: Pupils are equal, round, and reactive to light.   Cardiovascular:      Rate and Rhythm: Normal rate and regular rhythm.      Pulses: Normal pulses.   Pulmonary:      Effort: Pulmonary effort is normal.      Breath sounds: Normal breath sounds. No wheezing, rhonchi or rales.   Musculoskeletal:      Cervical back: Normal range of motion and neck supple.      Right lower leg: No edema.      Left lower leg: No edema.   Skin:     General: Skin is warm and dry.      Capillary Refill: Capillary refill takes 2 to 3 seconds.   Neurological:      Mental Status: She is alert and oriented to person, place, and time.   Psychiatric:         Mood and Affect: Mood normal.         Behavior: Behavior normal.         Thought Content: " Thought content normal.         Judgment: Judgment normal.                Assessment & Plan     1. PAF (paroxysmal atrial fibrillation) ([facility])  EKG      2. H/O cardiac radiofrequency ablation        3. Anticoagulated            Medical Decision Making: Today's Assessment/Status/Plan:   1.  Paroxysmal atrial fibrillation and 1C the mediated atrial flutter  2.  Status post ablation  - Status post ablation approximately 2 weeks ago 5/15/2023 with Dr. Flaherty.  Procedure completed with PVI and CTI with bidirectional block for typical atrial flutter.  -Twelve-lead EKG today demonstrates sinus bradycardia.  She historically has known when she has had arrhythmia secondary to symptoms.  Additional Hx with [employer] watch.  1 known short A-fib event thus far post ablation.  -We have discussed post ablation healing timeframe/expectations today.  She will continue low-dose Toprol at this time.  She will continue Eliquis 5 mg twice daily.    3.  Anticoagulation  - Reports tolerating Eliquis well without evidence of bleeding.  [id number] score of 2 (gender/age).  Discussed continue Eliquis for now.  May have opportunity to look at discontinue in the future pending ablation healing.    Return to clinic in 6 weeks for review, sooner if clinical condition changes.  She will contact us via [employer] should questions or concerns arise.    PLEASE NOTE: This Note was created using voice recognition Software. I have made every reasonable attempt to correct obvious errors, but I expect that there are errors of grammar and possibly content that I did not discover before finalizing the note

## 2023-06-01 NOTE — PROGRESS NOTES
We can try to see if [place] pharmacy has samples first.  She may not need long term AC, but is post recent ablation.

## 2023-06-06 NOTE — PROGRESS NOTES
Unfortunately the sample pharmacy does not have any eliquis or the dose of xarelto that you would need at this time.      Can see if Xarelto 20 mg daily or Pradaxa 150 mg twice daily has been coverage under your insurance plan?  If so, we can switch it to a more affordable option.     Thank you  Negrita

## 2023-06-19 ENCOUNTER — OFFICE VISIT (OUTPATIENT)
Dept: CARDIOLOGY | Facility: MEDICAL CENTER | Age: 73
End: 2023-06-19
Attending: INTERNAL MEDICINE
Payer: MEDICARE

## 2023-06-19 VITALS
HEIGHT: 63 IN | HEART RATE: 64 BPM | SYSTOLIC BLOOD PRESSURE: 110 MMHG | BODY MASS INDEX: 27.29 KG/M2 | RESPIRATION RATE: 12 BRPM | DIASTOLIC BLOOD PRESSURE: 68 MMHG | WEIGHT: 154 LBS | OXYGEN SATURATION: 99 %

## 2023-06-19 DIAGNOSIS — I48.0 PAF (PAROXYSMAL ATRIAL FIBRILLATION) (HCC): ICD-10-CM

## 2023-06-19 DIAGNOSIS — E78.2 MIXED HYPERLIPIDEMIA: ICD-10-CM

## 2023-06-19 DIAGNOSIS — Z79.01 ANTICOAGULATED: ICD-10-CM

## 2023-06-19 DIAGNOSIS — Z98.890 H/O CARDIAC RADIOFREQUENCY ABLATION: ICD-10-CM

## 2023-06-19 PROBLEM — Z86.79 S/P ABLATION OF ATRIAL FIBRILLATION: Status: ACTIVE | Noted: 2023-05-30

## 2023-06-19 LAB — EKG IMPRESSION: NORMAL

## 2023-06-19 PROCEDURE — 99213 OFFICE O/P EST LOW 20 MIN: CPT | Performed by: INTERNAL MEDICINE

## 2023-06-19 PROCEDURE — 93005 ELECTROCARDIOGRAM TRACING: CPT | Performed by: INTERNAL MEDICINE

## 2023-06-19 PROCEDURE — 3078F DIAST BP <80 MM HG: CPT | Performed by: INTERNAL MEDICINE

## 2023-06-19 PROCEDURE — 3074F SYST BP LT 130 MM HG: CPT | Performed by: INTERNAL MEDICINE

## 2023-06-19 PROCEDURE — 93010 ELECTROCARDIOGRAM REPORT: CPT | Performed by: INTERNAL MEDICINE

## 2023-06-19 PROCEDURE — 99214 OFFICE O/P EST MOD 30 MIN: CPT | Performed by: INTERNAL MEDICINE

## 2023-06-19 PROCEDURE — 99212 OFFICE O/P EST SF 10 MIN: CPT | Performed by: INTERNAL MEDICINE

## 2023-06-19 ASSESSMENT — FIBROSIS 4 INDEX: FIB4 SCORE: 1.57

## 2023-06-19 NOTE — PROGRESS NOTES
"Chief Complaint   Patient presents with    Atrial Fibrillation     FV Dx: PAF       Subjective     Edimla Taylor is a 72 y.o. female who presents today status post flutter and fibrillation.  No significant recurrences.  On flecainide.  Low-dose beta-blockers.  On Eliquis.  Patient's Chads Vasc score is 2.  Did not get a sleep study.    Past Medical History:   Diagnosis Date    Anesthesia     WAKES UP SLOWLY     hypotensive episode 2015    Arrhythmia     Arthritis     osteoarthritis in joints \"knees especially\"    Cancer ([facility]) 1999    skin basal and squamous    Cataract     IOL bilateral    Delayed emergence from general anesthesia     Pain     knee pain    Sensitive to concentration of IV drug     and oral drugs    Thyroid activity decreased     takes suppliment    Transfusion history 1974    hemorrage after D&C    Unspecified hemorrhagic conditions 1980s    from taking too much aspirin     Past Surgical History:   Procedure Laterality Date    [place] TENOTOMY PERC TOE MULTIPLE TENDONS Bilateral 03/06/2023    Procedure: LEFT THIRD AND FOURTH TOE FLEXOR TENOTOMY, RIGHT THIRD, FOURTH AND FIFTH TOE FLEXOR TENOTOMY;  Surgeon: Noel Mansfield M.D.;  Location: [facility];  Service: Orthopedics    PB REPAIR OF HAMMERTOE,ONE Bilateral 03/06/2023    Procedure: LEFT DISTAL INTERPHALANGEAL JOINT FUSION, RIGHT THIRD AND FOURTH DISTAL INTERPHALANGEAL JOINT FUSION,  RIGHT FIFTH PROXIMAL INTERPHALANGEAL FUSION, RIGHT DISTAL PHALANX EXOSECTOMY;  Surgeon: Noel Mansfield M.D.;  Location: [facility];  Service: Orthopedics    KNEE ARTHROPLASTY TOTAL Right 06/27/2018    Procedure: KNEE ARTHROPLASTY TOTAL;  Surgeon: Sindi Ochoa M.D.;  Location: SURGERY [facility];  Service: Orthopedics    KNEE ARTHROSCOPY Right 12/28/2017    x 2    KNEE ARTHROSCOPY Right 12/28/2017    Procedure: RT KNEE ARTHROSCOPY, Chondroplasty, Partial Meniscectomy;  Surgeon: Jaswant Velez M.D.;  " "Location: SURGERY [facility];  Service: Orthopedics    CATARACT EXTRACTION WITH IOL Bilateral 2016    PTOSIS REPAIR  03/31/2015    Performed by Alfredo Marcus M.D. at SURGERY SURGICAL [facility] ORS    HYSTERECTOMY, TOTAL ABDOMINAL  1992    APPENDECTOMY  1992    CARPAL TUNNEL RELEASE Bilateral 1991    BUNIONECTOMY Bilateral 1988    DILATION AND CURETTAGE      FOOT SURGERY  2012, 3/2023    hammertoes    HAND SURGERY  12/2011    ORIF, KNEE  06/2018    Total knee replace     History reviewed. No pertinent family history.  Social History     Socioeconomic History    Marital status:      Spouse name: Not on file    Number of children: Not on file    Years of education: Not on file    Highest education level: Not on file   Occupational History    Not on file   Tobacco Use    Smoking status: Never     Passive exposure: Past    Smokeless tobacco: Never   Vaping Use    Vaping Use: Never used   Substance and Sexual Activity    Alcohol use: Yes     Alcohol/week: 0.6 oz     Types: 1 Glasses of wine per week     Comment: wine with some meals    Drug use: No    Sexual activity: Not on file   Other Topics Concern    Not on file   Social History Narrative    Not on file     Social Determinants of Health     Financial Resource Strain: Not on file   Food Insecurity: Not on file   Transportation Needs: Not on file   Physical Activity: Not on file   Stress: Not on file   Social Connections: Not on file   Intimate Partner Violence: Not on file   Housing Stability: Not on file     Allergies   Allergen Reactions    Kenalog Rash     INJECTION  CAUSES VERTIGO / THRUSH  Other reaction(s): Flushing    Penicillins Hives and Rash     HIVES    HIVES    Doxycycline Unspecified     \"Hurts to swallow\"  Other reaction(s): Other    Morphine Nausea and Vomiting     EXTREME VOMITING  Other reaction(s): GI Upset  EXTREME VOMITING    Sulfa Drugs      EXTREME MUCOUS PRODUCTION  Other reaction(s): Rhinitis  EXTREME MUCOUS PRODUCTION    Gabapentin " "Palpitations     Outpatient Encounter Medications as of 6/19/2023   Medication Sig Dispense Refill    estradiol (ESTRACE) 0.5 MG tablet       ketoconazole (NIZORAL) 2 % shampoo       omeprazole (PRILOSEC) 20 MG delayed-release capsule Take 1 Capsule by mouth every day. 30 Capsule 3    apixaban (ELIQUIS) 5mg Tab Take 1 Tablet by mouth 2 times a day.      levothyroxine (SYNTHROID) 50 MCG Tab 1 TABLET IN THE MORNING ON AN EMPTY STOMACH ORALLY ONCE A DAY      metoprolol SR (TOPROL XL) 25 MG TABLET SR 24 HR Take 12.5 mg by mouth every evening.      montelukast (SINGULAIR) 10 MG Tab TAKE 1 TABLET (10 MG) BY MOUTH NIGHTLY AT 9 PM      Nutritional Supplements (JOINT FORMULA PO) Take 1 Tab by mouth every day.      ascorbic acid (ASCORBIC ACID) 500 MG TABS Take 1,000 mg by mouth every day.      Non Formulary Request Take 1 Cap by mouth every day. EYE VITAMIN      Probiotic Product (PROBIOTIC DAILY PO) Take 1 Cap by mouth every day.      Non Formulary Request Take 1 Cap by mouth every day. VITAMIN D + CALCIUM      [DISCONTINUED] ESTRADIOL PO 0.5 mg every evening. (Patient not taking: Reported on 6/19/2023)      therapeutic multivitamin-minerals (THERAGRAN-M) TABS Take 1 Tab by mouth every day.       No facility-administered encounter medications on file as of 6/19/2023.     ROS           Objective     /68 (BP Location: Left arm, Patient Position: Sitting, BP Cuff Size: Adult)   Pulse 64   Resp 12   Ht 1.6 m (5' 3\")   Wt 69.9 kg (154 lb)   SpO2 99%   BMI 27.28 kg/m²     Physical Exam  Constitutional:       Appearance: She is well-developed.   HENT:      Head: Normocephalic and atraumatic.   Eyes:      Pupils: Pupils are equal, round, and reactive to light.   Cardiovascular:      Rate and Rhythm: Normal rate and regular rhythm.      Heart sounds: Normal heart sounds. No murmur heard.     No friction rub. No gallop.   Pulmonary:      Effort: Pulmonary effort is normal.      Breath sounds: Normal breath sounds. "   Abdominal:      General: Bowel sounds are normal.      Palpations: Abdomen is soft.   Musculoskeletal:         General: Normal range of motion.      Cervical back: Normal range of motion and neck supple.   Skin:     General: Skin is warm.   Neurological:      Mental Status: She is alert and oriented to person, place, and time.      Cranial Nerves: No cranial nerve deficit.   Psychiatric:         Behavior: Behavior normal.         Thought Content: Thought content normal.         Judgment: Judgment normal.                Assessment & Plan     1. PAF (paroxysmal atrial fibrillation) ([facility])  EKG      2. H/O cardiac radiofrequency ablation        3. Anticoagulated        4. Mixed hyperlipidemia  CT-CARDIAC SCORING          Medical Decision Making: Today's Assessment/Status/Plan:   1.  Paroxysmal atrial arrhythmias.  Good initial response fibrillation.  2.  Anticoagulation continue for now.  3.  Hyperlipidemia.  Recheck coronary calcium scan.  4.  Follow-up in 3 months.

## 2023-06-21 LAB — EKG IMPRESSION: NORMAL

## 2023-06-21 PROCEDURE — 93010 ELECTROCARDIOGRAM REPORT: CPT | Performed by: INTERNAL MEDICINE

## 2023-06-30 ENCOUNTER — APPOINTMENT (OUTPATIENT)
Dept: CARDIOLOGY | Facility: MEDICAL CENTER | Age: 73
End: 2023-06-30
Payer: MEDICARE

## 2023-07-06 ENCOUNTER — APPOINTMENT (RX ONLY)
Dept: URBAN - METROPOLITAN AREA CLINIC 31 | Facility: CLINIC | Age: 73
Setting detail: DERMATOLOGY
End: 2023-07-06

## 2023-07-06 ENCOUNTER — HOSPITAL ENCOUNTER (OUTPATIENT)
Dept: RADIOLOGY | Facility: MEDICAL CENTER | Age: 73
End: 2023-07-06
Attending: INTERNAL MEDICINE
Payer: COMMERCIAL

## 2023-07-06 DIAGNOSIS — D18.0 HEMANGIOMA: ICD-10-CM

## 2023-07-06 DIAGNOSIS — L82.1 OTHER SEBORRHEIC KERATOSIS: ICD-10-CM

## 2023-07-06 DIAGNOSIS — E78.2 MIXED HYPERLIPIDEMIA: ICD-10-CM

## 2023-07-06 DIAGNOSIS — Z85.828 PERSONAL HISTORY OF OTHER MALIGNANT NEOPLASM OF SKIN: ICD-10-CM

## 2023-07-06 DIAGNOSIS — D22 MELANOCYTIC NEVI: ICD-10-CM

## 2023-07-06 DIAGNOSIS — Z71.89 OTHER SPECIFIED COUNSELING: ICD-10-CM

## 2023-07-06 DIAGNOSIS — L81.4 OTHER MELANIN HYPERPIGMENTATION: ICD-10-CM

## 2023-07-06 DIAGNOSIS — L21.8 OTHER SEBORRHEIC DERMATITIS: ICD-10-CM

## 2023-07-06 PROBLEM — D22.5 MELANOCYTIC NEVI OF TRUNK: Status: ACTIVE | Noted: 2023-07-06

## 2023-07-06 PROBLEM — D18.01 HEMANGIOMA OF SKIN AND SUBCUTANEOUS TISSUE: Status: ACTIVE | Noted: 2023-07-06

## 2023-07-06 PROCEDURE — ? PRESCRIPTION

## 2023-07-06 PROCEDURE — 99213 OFFICE O/P EST LOW 20 MIN: CPT

## 2023-07-06 PROCEDURE — 4410556 CT-CARDIAC SCORING (SELF PAY ONLY)

## 2023-07-06 PROCEDURE — ? COUNSELING

## 2023-07-06 RX ORDER — FLUOCINONIDE 0.5 MG/ML
SOLUTION TOPICAL
Qty: 60 | Refills: 11 | Status: ERX

## 2023-07-06 ASSESSMENT — LOCATION SIMPLE DESCRIPTION DERM
LOCATION SIMPLE: RIGHT UPPER BACK
LOCATION SIMPLE: RIGHT SCALP

## 2023-07-06 ASSESSMENT — LOCATION ZONE DERM
LOCATION ZONE: SCALP
LOCATION ZONE: TRUNK

## 2023-07-06 ASSESSMENT — LOCATION DETAILED DESCRIPTION DERM
LOCATION DETAILED: RIGHT INFERIOR UPPER BACK
LOCATION DETAILED: RIGHT SUPERIOR UPPER BACK
LOCATION DETAILED: RIGHT MEDIAL FRONTAL SCALP

## 2023-07-06 NOTE — PROCEDURE: MIPS QUALITY
Quality 111:Pneumonia Vaccination Status For Older Adults: Patient received any pneumococcal conjugate or polysaccharide vaccine on or after their 60th birthday and before the end of the measurement period
Quality 226: Preventive Care And Screening: Tobacco Use: Screening And Cessation Intervention: Patient screened for tobacco use and is an ex/non-smoker
Quality 130: Documentation Of Current Medications In The Medical Record: Current Medications Documented
Detail Level: Detailed
Quality 110: Preventive Care And Screening: Influenza Immunization: Influenza Immunization previously received during influenza season
Quality 431: Preventive Care And Screening: Unhealthy Alcohol Use - Screening: Patient not identified as an unhealthy alcohol user when screened for unhealthy alcohol use using a systematic screening method

## 2023-07-07 ENCOUNTER — TELEPHONE (OUTPATIENT)
Dept: CARDIOLOGY | Facility: MEDICAL CENTER | Age: 73
End: 2023-07-07
Payer: MEDICARE

## 2023-07-07 ENCOUNTER — PATIENT MESSAGE (OUTPATIENT)
Dept: CARDIOLOGY | Facility: MEDICAL CENTER | Age: 73
End: 2023-07-07
Payer: MEDICARE

## 2023-07-07 NOTE — TELEPHONE ENCOUNTER
Netlog message sent to patient.    ----- Message from Donell Flaherty M.D. sent at 7/7/2023  7:54 AM PDT -----  Coronary calcium score low. Continue current regimen

## 2023-07-11 NOTE — TELEPHONE ENCOUNTER
To: DS    Just to clarify, would you like patient to begin statin therapy? Base on Calcium score of 58.5. Please advise. Thank you

## 2023-07-17 ENCOUNTER — APPOINTMENT (OUTPATIENT)
Dept: CARDIOLOGY | Facility: MEDICAL CENTER | Age: 73
End: 2023-07-17
Payer: MEDICARE

## 2023-09-13 ENCOUNTER — OFFICE VISIT (OUTPATIENT)
Dept: CARDIOLOGY | Facility: MEDICAL CENTER | Age: 73
End: 2023-09-13
Attending: INTERNAL MEDICINE
Payer: MEDICARE

## 2023-09-13 VITALS
OXYGEN SATURATION: 95 % | BODY MASS INDEX: 27.29 KG/M2 | HEART RATE: 67 BPM | WEIGHT: 154 LBS | HEIGHT: 63 IN | DIASTOLIC BLOOD PRESSURE: 64 MMHG | SYSTOLIC BLOOD PRESSURE: 102 MMHG | RESPIRATION RATE: 16 BRPM

## 2023-09-13 DIAGNOSIS — Z79.01 ANTICOAGULATED: ICD-10-CM

## 2023-09-13 DIAGNOSIS — I48.0 PAF (PAROXYSMAL ATRIAL FIBRILLATION) (HCC): ICD-10-CM

## 2023-09-13 DIAGNOSIS — Z86.79 S/P ABLATION OF ATRIAL FIBRILLATION: ICD-10-CM

## 2023-09-13 DIAGNOSIS — E78.2 MIXED HYPERLIPIDEMIA: ICD-10-CM

## 2023-09-13 DIAGNOSIS — Z98.890 S/P ABLATION OF ATRIAL FIBRILLATION: ICD-10-CM

## 2023-09-13 LAB — EKG IMPRESSION: NORMAL

## 2023-09-13 PROCEDURE — 3074F SYST BP LT 130 MM HG: CPT | Performed by: INTERNAL MEDICINE

## 2023-09-13 PROCEDURE — 93005 ELECTROCARDIOGRAM TRACING: CPT | Performed by: INTERNAL MEDICINE

## 2023-09-13 PROCEDURE — 3078F DIAST BP <80 MM HG: CPT | Performed by: INTERNAL MEDICINE

## 2023-09-13 PROCEDURE — 99213 OFFICE O/P EST LOW 20 MIN: CPT | Performed by: INTERNAL MEDICINE

## 2023-09-13 PROCEDURE — 99214 OFFICE O/P EST MOD 30 MIN: CPT | Performed by: INTERNAL MEDICINE

## 2023-09-13 PROCEDURE — 93010 ELECTROCARDIOGRAM REPORT: CPT | Performed by: INTERNAL MEDICINE

## 2023-09-13 ASSESSMENT — FIBROSIS 4 INDEX: FIB4 SCORE: 1.57

## 2023-09-13 NOTE — PROGRESS NOTES
"Chief Complaint   Patient presents with    Atrial Fibrillation     F/v Dx:PAF (paroxysmal atrial fibrillation)       Subjective     Edilma Taylor is a 72 y.o. female who presents today status post ablation for fib and flutter.  No recurrence.  Off flecainide.  Continues on anticoagulation.  [id number] score 2.  On low-dose beta-blockers.    Past Medical History:   Diagnosis Date    Anesthesia     WAKES UP SLOWLY     hypotensive episode 2015    Arrhythmia     Arthritis     osteoarthritis in joints \"knees especially\"    Cancer (HCC) 1999    skin basal and squamous    Cataract     IOL bilateral    Delayed emergence from general anesthesia     Pain     knee pain    Sensitive to concentration of IV drug     and oral drugs    Thyroid activity decreased     takes suppliment    Transfusion history 1974    hemorrage after D&C    Unspecified hemorrhagic conditions 1980s    from taking too much aspirin     Past Surgical History:   Procedure Laterality Date    MA TENOTOMY PERC TOE MULTIPLE TENDONS Bilateral 03/06/2023    Procedure: LEFT THIRD AND FOURTH TOE FLEXOR TENOTOMY, RIGHT THIRD, FOURTH AND FIFTH TOE FLEXOR TENOTOMY;  Surgeon: Noel Mansfield M.D.;  Location: [facility];  Service: Orthopedics    PB REPAIR OF HAMMERTOE,ONE Bilateral 03/06/2023    Procedure: LEFT DISTAL INTERPHALANGEAL JOINT FUSION, RIGHT THIRD AND FOURTH DISTAL INTERPHALANGEAL JOINT FUSION,  RIGHT FIFTH PROXIMAL INTERPHALANGEAL FUSION, RIGHT DISTAL PHALANX EXOSECTOMY;  Surgeon: Noel Mansfield M.D.;  Location: [facility];  Service: Orthopedics    KNEE ARTHROPLASTY TOTAL Right 06/27/2018    Procedure: KNEE ARTHROPLASTY TOTAL;  Surgeon: Sindi Ochoa M.D.;  Location: SURGERY [facility];  Service: Orthopedics    KNEE ARTHROSCOPY Right 12/28/2017    x 2    KNEE ARTHROSCOPY Right 12/28/2017    Procedure: RT KNEE ARTHROSCOPY, Chondroplasty, Partial Meniscectomy;  Surgeon: Jaswant Velez, " "M.D.;  Location: SURGERY [facility];  Service: Orthopedics    CATARACT EXTRACTION WITH IOL Bilateral 2016    PTOSIS REPAIR  03/31/2015    Performed by Alfredo Marcus M.D. at [facility]ERY [facility]    HYSTERECTOMY, TOTAL ABDOMINAL  1992    APPENDECTOMY  1992    CARPAL TUNNEL RELEASE Bilateral 1991    BUNIONECTOMY Bilateral 1988    DILATION AND CURETTAGE      FOOT SURGERY  2012, 3/2023    hammertoes    HAND SURGERY  12/2011    ORIF, KNEE  06/2018    Total knee replace     History reviewed. No pertinent family history.  Social History     Socioeconomic History    Marital status:      Spouse name: Not on file    Number of children: Not on file    Years of education: Not on file    Highest education level: Not on file   Occupational History    Not on file   Tobacco Use    Smoking status: Never     Passive exposure: Past    Smokeless tobacco: Never   Vaping Use    Vaping Use: Never used   Substance and Sexual Activity    Alcohol use: Yes     Alcohol/week: 0.6 oz     Types: 1 Glasses of wine per week     Comment: wine with some meals    Drug use: No    Sexual activity: Not on file   Other Topics Concern    Not on file   Social History Narrative    Not on file     Social Determinants of Health     Financial Resource Strain: Not on file   Food Insecurity: Not on file   Transportation Needs: Not on file   Physical Activity: Not on file   Stress: Not on file   Social Connections: Not on file   Intimate Partner Violence: Not on file   Housing Stability: Not on file     Allergies   Allergen Reactions    Kenalog Rash     INJECTION  CAUSES VERTIGO / THRUSH  Other reaction(s): Flushing    Penicillins Hives and Rash     HIVES    HIVES    Doxycycline Unspecified     \"Hurts to swallow\"  Other reaction(s): Other    Morphine Nausea and Vomiting     EXTREME VOMITING  Other reaction(s): GI Upset  EXTREME VOMITING    Sulfa Drugs      EXTREME MUCOUS PRODUCTION  Other reaction(s): Rhinitis  EXTREME MUCOUS PRODUCTION    " "Gabapentin Palpitations     Outpatient Encounter Medications as of 9/13/2023   Medication Sig Dispense Refill    estradiol (ESTRACE) 0.5 MG tablet       ketoconazole (NIZORAL) 2 % shampoo       apixaban (ELIQUIS) 5mg Tab Take 1 Tablet by mouth 2 times a day.      levothyroxine (SYNTHROID) 50 MCG Tab 1 TABLET IN THE MORNING ON AN EMPTY STOMACH ORALLY ONCE A DAY      metoprolol SR (TOPROL XL) 25 MG TABLET SR 24 HR Take 12.5 mg by mouth every evening.      montelukast (SINGULAIR) 10 MG Tab TAKE 1 TABLET (10 MG) BY MOUTH NIGHTLY AT 9 PM      Nutritional Supplements (JOINT FORMULA PO) Take 1 Tab by mouth every day.      ascorbic acid (ASCORBIC ACID) 500 MG TABS Take 1,000 mg by mouth every day.      Non Formulary Request Take 1 Cap by mouth every day. EYE VITAMIN      Probiotic Product (PROBIOTIC DAILY PO) Take 1 Cap by mouth every day.      Non Formulary Request Take 1 Cap by mouth every day. VITAMIN D + CALCIUM      [DISCONTINUED] omeprazole (PRILOSEC) 20 MG delayed-release capsule Take 1 Capsule by mouth every day. 30 Capsule 3    therapeutic multivitamin-minerals (THERAGRAN-M) TABS Take 1 Tab by mouth every day.       No facility-administered encounter medications on file as of 9/13/2023.     ROS           Objective     /64 (BP Location: Left arm, Patient Position: Sitting, BP Cuff Size: Adult)   Pulse 67   Resp 16   Ht 1.6 m (5' 3\")   Wt 69.9 kg (154 lb)   SpO2 95%   BMI 27.28 kg/m²     Physical Exam  Constitutional:       Appearance: She is well-developed.   HENT:      Head: Normocephalic and atraumatic.   Eyes:      Pupils: Pupils are equal, round, and reactive to light.   Cardiovascular:      Rate and Rhythm: Normal rate and regular rhythm.      Heart sounds: Normal heart sounds. No murmur heard.     No friction rub. No gallop.   Pulmonary:      Effort: Pulmonary effort is normal.      Breath sounds: Normal breath sounds.   Abdominal:      General: Bowel sounds are normal.      Palpations: Abdomen is " soft.   Musculoskeletal:         General: Normal range of motion.      Cervical back: Normal range of motion and neck supple.   Skin:     General: Skin is warm.   Neurological:      Mental Status: She is alert and oriented to person, place, and time.      Cranial Nerves: No cranial nerve deficit.   Psychiatric:         Behavior: Behavior normal.         Thought Content: Thought content normal.         Judgment: Judgment normal.                Assessment & Plan     1. PAF (paroxysmal atrial fibrillation) ([facility])  EKG      2. S/P ablation of atrial fibrillation/flutter        3. Mixed hyperlipidemia        4. Anticoagulated            Medical Decision Making: Today's Assessment/Status/Plan:   1.  Status post ablation good initial results.  2.  Anticoagulation patient wishes to continue on.  3.  Can taper off beta-blockers.  4.  Follow-up with me in 6 to 12 months.

## 2023-10-25 ENCOUNTER — OFFICE VISIT (OUTPATIENT)
Dept: URGENT CARE | Facility: CLINIC | Age: 73
End: 2023-10-25
Payer: MEDICARE

## 2023-10-25 VITALS
RESPIRATION RATE: 18 BRPM | WEIGHT: 155 LBS | DIASTOLIC BLOOD PRESSURE: 60 MMHG | OXYGEN SATURATION: 100 % | HEIGHT: 63 IN | SYSTOLIC BLOOD PRESSURE: 112 MMHG | BODY MASS INDEX: 27.46 KG/M2 | TEMPERATURE: 98.6 F

## 2023-10-25 DIAGNOSIS — H65.93 MIDDLE EAR EFFUSION, BILATERAL: ICD-10-CM

## 2023-10-25 PROCEDURE — 3078F DIAST BP <80 MM HG: CPT | Performed by: PHYSICIAN ASSISTANT

## 2023-10-25 PROCEDURE — 99203 OFFICE O/P NEW LOW 30 MIN: CPT | Performed by: PHYSICIAN ASSISTANT

## 2023-10-25 PROCEDURE — 3074F SYST BP LT 130 MM HG: CPT | Performed by: PHYSICIAN ASSISTANT

## 2023-10-25 RX ORDER — PREDNISONE 10 MG/1
TABLET ORAL
Qty: 10 TABLET | Refills: 0 | Status: SHIPPED | OUTPATIENT
Start: 2023-10-25 | End: 2024-02-13

## 2023-10-25 ASSESSMENT — ENCOUNTER SYMPTOMS
SORE THROAT: 0
VOMITING: 0
DIZZINESS: 0
SPUTUM PRODUCTION: 0
DIARRHEA: 0
MYALGIAS: 0
FATIGUE: 0
SHORTNESS OF BREATH: 0
VERTIGO: 0
SWOLLEN GLANDS: 0
CHILLS: 0
WHEEZING: 0
HEADACHES: 0
NAUSEA: 0
FEVER: 0
COUGH: 0
ANOREXIA: 0

## 2023-10-25 ASSESSMENT — FIBROSIS 4 INDEX: FIB4 SCORE: 1.59

## 2023-10-25 NOTE — PROGRESS NOTES
"Subjective     Edilma Taylor is a 73 y.o. female who presents with Cerumen Impaction (X 2 days)            Ear Fullness  This is a new problem. Episode onset: 2 days. The problem occurs constantly. The problem has been unchanged. Pertinent negatives include no anorexia, chest pain, chills, congestion, coughing, fatigue, fever, headaches, myalgias, nausea, rash, sore throat, swollen glands, vertigo or vomiting. Associated symptoms comments: Ear fullness- no pain. Nothing aggravates the symptoms. Treatments tried: Flonase, Sudafed, Claritin, AFrin. The treatment provided no relief.       Past Medical History:   Diagnosis Date    Anesthesia     WAKES UP SLOWLY     hypotensive episode 2015    Arrhythmia     Arthritis     osteoarthritis in joints \"knees especially\"    Cancer (HCC) 1999    skin basal and squamous    Cataract     IOL bilateral    Delayed emergence from general anesthesia     Pain     knee pain    Sensitive to concentration of IV drug     and oral drugs    Thyroid activity decreased     takes suppliment    Transfusion history 1974    hemorrage after D&C    Unspecified hemorrhagic conditions 1980s    from taking too much aspirin         Past Surgical History:   Procedure Laterality Date    TX TENOTOMY PERC TOE MULTIPLE TENDONS Bilateral 03/06/2023    Procedure: LEFT THIRD AND FOURTH TOE FLEXOR TENOTOMY, RIGHT THIRD, FOURTH AND FIFTH TOE FLEXOR TENOTOMY;  Surgeon: Noel Mansfield M.D.;  Location: [facility];  Service: Orthopedics    PB REPAIR OF HAMMERTOE,ONE Bilateral 03/06/2023    Procedure: LEFT DISTAL INTERPHALANGEAL JOINT FUSION, RIGHT THIRD AND FOURTH DISTAL INTERPHALANGEAL JOINT FUSION,  RIGHT FIFTH PROXIMAL INTERPHALANGEAL FUSION, RIGHT DISTAL PHALANX EXOSECTOMY;  Surgeon: Noel Mansfield M.D.;  Location: [facility];  Service: Orthopedics    KNEE ARTHROPLASTY TOTAL Right 06/27/2018    Procedure: KNEE ARTHROPLASTY TOTAL;  Surgeon: Sindi" "KEITH Ochoa;  Location: SURGERY [facility];  Service: Orthopedics    KNEE ARTHROSCOPY Right 12/28/2017    x 2    KNEE ARTHROSCOPY Right 12/28/2017    Procedure: RT KNEE ARTHROSCOPY, Chondroplasty, Partial Meniscectomy;  Surgeon: Jaswant Velez M.D.;  Location: SURGERY [facility];  Service: Orthopedics    CATARACT EXTRACTION WITH IOL Bilateral 2016    PTOSIS REPAIR  03/31/2015    Performed by Alfredo Marcus M.D. at SURGERY SURG[facility]    HYSTERECTOMY, TOTAL ABDOMINAL  1992    APPENDECTOMY  1992    CARPAL TUNNEL RELEASE Bilateral 1991    BUNIONECTOMY Bilateral 1988    DILATION AND CURETTAGE      FOOT SURGERY  2012, 3/2023    hammertoes    HAND SURGERY  12/2011    ORIF, KNEE  06/2018    Total knee replace         No family history on file.      Allergies:  Gabapentin, Kenalog, Penicillins, Triamcinolone, Doxycycline, Morphine, Sulfa drugs, Kdc:benzyl alcohol+sorbitan+triamcinolone, and Penicillin g      Medications, Allergies, and current problem list reviewed today in Epic      Review of Systems   Constitutional:  Negative for chills, fatigue, fever and malaise/fatigue.   HENT:  Positive for hearing loss. Negative for congestion, ear discharge, ear pain and sore throat.    Respiratory:  Negative for cough, sputum production, shortness of breath and wheezing.    Cardiovascular:  Negative for chest pain and leg swelling.   Gastrointestinal:  Negative for anorexia, diarrhea, nausea and vomiting.   Musculoskeletal:  Negative for myalgias.   Skin:  Negative for rash.   Neurological:  Negative for dizziness, vertigo and headaches.     All other systems reviewed and are negative.            Objective     /60   Temp 37 °C (98.6 °F) (Temporal)   Resp 18   Ht 1.6 m (5' 3\")   Wt 70.3 kg (155 lb)   SpO2 100%   BMI 27.46 kg/m²      Physical Exam  Constitutional:       General: She is not in acute distress.     Appearance: She is not ill-appearing.   HENT:      Head: Normocephalic and " atraumatic.      Right Ear: Ear canal and external ear normal. No drainage or swelling. A middle ear effusion is present. No mastoid tenderness. Tympanic membrane is not injected.      Left Ear: Ear canal and external ear normal. No drainage or swelling. A middle ear effusion is present. No mastoid tenderness. Tympanic membrane is not injected.   Eyes:      Conjunctiva/sclera: Conjunctivae normal.   Cardiovascular:      Rate and Rhythm: Normal rate and regular rhythm.   Pulmonary:      Effort: Pulmonary effort is normal. No respiratory distress.      Breath sounds: No stridor. No wheezing.   Lymphadenopathy:      Cervical: No cervical adenopathy.   Skin:     General: Skin is warm and dry.      Findings: No rash.   Neurological:      General: No focal deficit present.      Mental Status: She is alert and oriented to person, place, and time.   Psychiatric:         Mood and Affect: Mood normal.         Behavior: Behavior normal.         Thought Content: Thought content normal.         Judgment: Judgment normal.                             Assessment & Plan        1. Middle ear effusion, bilateral      Patient tolerated Dexamethasone in the past.  Advised patient start with 10mg tablets daily and then increase to 20 mg if tolerate medicine  Keep Benadryl on hand  Continue Flonase, antihistamine.   - predniSONE (DELTASONE) 10 MG Tab; 2 tabs daily x 5 days.  Dispense: 10 Tablet; Refill: 0     Differential diagnoses, Supportive care, and indications for immediate follow-up discussed with patient.   Pathogenesis of diagnosis discussed including typical length and natural progression.   Instructed to return to clinic or nearest emergency department for any change in condition, further concerns, or worsening of symptoms.      The patient demonstrated a good understanding and agreed with the treatment plan.      Kera Back P.A.-C.

## 2023-12-08 ENCOUNTER — APPOINTMENT (RX ONLY)
Dept: URBAN - METROPOLITAN AREA CLINIC 31 | Facility: CLINIC | Age: 73
Setting detail: DERMATOLOGY
End: 2023-12-08

## 2023-12-08 PROBLEM — D48.5 NEOPLASM OF UNCERTAIN BEHAVIOR OF SKIN: Status: ACTIVE | Noted: 2023-12-08

## 2023-12-08 PROCEDURE — ? BIOPSY BY SHAVE METHOD

## 2023-12-08 PROCEDURE — 11102 TANGNTL BX SKIN SINGLE LES: CPT

## 2023-12-21 ENCOUNTER — APPOINTMENT (RX ONLY)
Dept: URBAN - METROPOLITAN AREA CLINIC 31 | Facility: CLINIC | Age: 73
Setting detail: DERMATOLOGY
End: 2023-12-21

## 2023-12-21 PROBLEM — C44.319 BASAL CELL CARCINOMA OF SKIN OF OTHER PARTS OF FACE: Status: ACTIVE | Noted: 2023-12-21

## 2023-12-21 PROCEDURE — 17281 DSTR MAL LS F/E/E/N/L/M .6-1: CPT

## 2023-12-21 PROCEDURE — ? CURETTAGE AND DESTRUCTION

## 2023-12-21 NOTE — PROCEDURE: MIPS QUALITY
Quality 130: Documentation Of Current Medications In The Medical Record: Current Medications Documented
Quality 110: Preventive Care And Screening: Influenza Immunization: Influenza Immunization previously received during influenza season
Detail Level: Detailed
Quality 431: Preventive Care And Screening: Unhealthy Alcohol Use - Screening: Patient not identified as an unhealthy alcohol user when screened for unhealthy alcohol use using a systematic screening method
Quality 226: Preventive Care And Screening: Tobacco Use: Screening And Cessation Intervention: Patient screened for tobacco use and is an ex/non-smoker
Quality 111:Pneumonia Vaccination Status For Older Adults: Patient received any pneumococcal conjugate or polysaccharide vaccine on or after their 60th birthday and before the end of the measurement period

## 2023-12-21 NOTE — PROCEDURE: CURETTAGE AND DESTRUCTION
Detail Level: Detailed
Accession # (Optional): [id number]
Number Of Curettages: 4
Size Of Lesion In Cm: 0.3
Size Of Lesion After Curettage: 0.7
Add Intralesional Injection: No
Concentration (Mg/Ml Or Millions Of Plaque Forming Units/Cc): 0.01
Total Volume (Ccs): 1
Anesthesia Type: 1% lidocaine with epinephrine
Anesthesia Volume In Cc: 0.5
Cautery Type: electrodesiccation
What Was Performed First?: Curettage
Final Size Statement: The size of the lesion after curettage was
Additional Information: (Optional): The wound was cleaned, and a pressure dressing was applied.  The patient received detailed post-op instructions.
Consent was obtained from the patient. The risks, benefits and alternatives to therapy were discussed in detail. Specifically, the risks of infection, scarring, bleeding, prolonged wound healing, nerve injury, incomplete removal, allergy to anesthesia and recurrence were addressed. Alternatives to ED&C, such as: surgical removal and XRT were also discussed.  Prior to the procedure, the treatment site was clearly identified and confirmed by the patient. All components of Universal Protocol/PAUSE Rule completed.
Post-Care Instructions: I reviewed with the patient in detail post-care instructions. Patient is to keep the area dry for 48 hours, and not to engage in any swimming until the area is healed. Should the patient develop any fevers, chills, bleeding, severe pain patient will contact the office immediately.
Bill As A Line Item Or As Units: Line Item

## 2024-01-05 ENCOUNTER — APPOINTMENT (RX ONLY)
Dept: URBAN - METROPOLITAN AREA CLINIC 31 | Facility: CLINIC | Age: 74
Setting detail: DERMATOLOGY
End: 2024-01-05

## 2024-01-05 DIAGNOSIS — D18.0 HEMANGIOMA: ICD-10-CM

## 2024-01-05 DIAGNOSIS — L259 CONTACT DERMATITIS AND OTHER ECZEMA, UNSPECIFIED CAUSE: ICD-10-CM

## 2024-01-05 DIAGNOSIS — Z85.828 PERSONAL HISTORY OF OTHER MALIGNANT NEOPLASM OF SKIN: ICD-10-CM

## 2024-01-05 DIAGNOSIS — L82.1 OTHER SEBORRHEIC KERATOSIS: ICD-10-CM

## 2024-01-05 DIAGNOSIS — D22 MELANOCYTIC NEVI: ICD-10-CM

## 2024-01-05 DIAGNOSIS — Z71.89 OTHER SPECIFIED COUNSELING: ICD-10-CM

## 2024-01-05 DIAGNOSIS — L81.4 OTHER MELANIN HYPERPIGMENTATION: ICD-10-CM

## 2024-01-05 PROBLEM — D22.5 MELANOCYTIC NEVI OF TRUNK: Status: ACTIVE | Noted: 2024-01-05

## 2024-01-05 PROBLEM — D18.01 HEMANGIOMA OF SKIN AND SUBCUTANEOUS TISSUE: Status: ACTIVE | Noted: 2024-01-05

## 2024-01-05 PROBLEM — L23.9 ALLERGIC CONTACT DERMATITIS, UNSPECIFIED CAUSE: Status: ACTIVE | Noted: 2024-01-05

## 2024-01-05 PROCEDURE — ? COUNSELING

## 2024-01-05 PROCEDURE — 99213 OFFICE O/P EST LOW 20 MIN: CPT

## 2024-01-05 PROCEDURE — ? PRESCRIPTION

## 2024-01-05 RX ORDER — TRIAMCINOLONE ACETONIDE 1 MG/G
CREAM TOPICAL BID
Qty: 45 | Refills: 0 | Status: ERX | COMMUNITY
Start: 2024-01-05

## 2024-01-05 RX ADMIN — TRIAMCINOLONE ACETONIDE: 1 CREAM TOPICAL at 00:00

## 2024-01-05 ASSESSMENT — LOCATION DETAILED DESCRIPTION DERM
LOCATION DETAILED: RIGHT INFERIOR UPPER BACK
LOCATION DETAILED: RIGHT MID-UPPER BACK
LOCATION DETAILED: RIGHT SUPERIOR UPPER BACK
LOCATION DETAILED: UPPER STERNUM
LOCATION DETAILED: LEFT SUPERIOR PARIETAL SCALP

## 2024-01-05 ASSESSMENT — LOCATION ZONE DERM
LOCATION ZONE: TRUNK
LOCATION ZONE: SCALP

## 2024-01-05 ASSESSMENT — LOCATION SIMPLE DESCRIPTION DERM
LOCATION SIMPLE: SCALP
LOCATION SIMPLE: CHEST
LOCATION SIMPLE: RIGHT UPPER BACK

## 2024-01-05 NOTE — PROCEDURE: COUNSELING
Detail Level: Generalized
Sunscreen Recommendations: Sun screen (SPF 30 or greater) should be applied during peak UV exposure (between 10am and 2pm) and reapplied after exercise or swimming.\\nRecommended La Roche- Posay Anthelios with SPF 60 or Devin Tone Water Babies with SPF 30 and above.
Detail Level: Detailed

## 2024-01-05 NOTE — HPI: ITCHING
What Type Of Note Output Would You Prefer (Optional)?: Standard Output
On A Scale Of 0 To 10 How Would You Rate Your Itching?: 4
How Did Your Itching Occur?: sudden in onset (over a period of weeks to a few months)

## 2024-02-02 ENCOUNTER — TELEPHONE (OUTPATIENT)
Dept: SCHEDULING | Facility: IMAGING CENTER | Age: 74
End: 2024-02-02

## 2024-02-09 ENCOUNTER — RESEARCH ENCOUNTER (OUTPATIENT)
Dept: RESEARCH | Facility: MEDICAL CENTER | Age: 74
End: 2024-02-09
Payer: MEDICARE

## 2024-02-09 DIAGNOSIS — Z00.6 RESEARCH STUDY PATIENT: ICD-10-CM

## 2024-02-09 SDOH — HEALTH STABILITY: PHYSICAL HEALTH: ON AVERAGE, HOW MANY DAYS PER WEEK DO YOU ENGAGE IN MODERATE TO STRENUOUS EXERCISE (LIKE A BRISK WALK)?: 7 DAYS

## 2024-02-09 SDOH — ECONOMIC STABILITY: FOOD INSECURITY: WITHIN THE PAST 12 MONTHS, THE FOOD YOU BOUGHT JUST DIDN'T LAST AND YOU DIDN'T HAVE MONEY TO GET MORE.: NEVER TRUE

## 2024-02-09 SDOH — HEALTH STABILITY: PHYSICAL HEALTH: ON AVERAGE, HOW MANY MINUTES DO YOU ENGAGE IN EXERCISE AT THIS LEVEL?: 30 MIN

## 2024-02-09 SDOH — ECONOMIC STABILITY: FOOD INSECURITY: WITHIN THE PAST 12 MONTHS, YOU WORRIED THAT YOUR FOOD WOULD RUN OUT BEFORE YOU GOT MONEY TO BUY MORE.: NEVER TRUE

## 2024-02-09 SDOH — ECONOMIC STABILITY: INCOME INSECURITY: IN THE LAST 12 MONTHS, WAS THERE A TIME WHEN YOU WERE NOT ABLE TO PAY THE MORTGAGE OR RENT ON TIME?: NO

## 2024-02-09 SDOH — ECONOMIC STABILITY: HOUSING INSECURITY
IN THE LAST 12 MONTHS, WAS THERE A TIME WHEN YOU DID NOT HAVE A STEADY PLACE TO SLEEP OR SLEPT IN A SHELTER (INCLUDING NOW)?: NO

## 2024-02-09 SDOH — ECONOMIC STABILITY: HOUSING INSECURITY: IN THE LAST 12 MONTHS, HOW MANY PLACES HAVE YOU LIVED?: 1

## 2024-02-09 SDOH — ECONOMIC STABILITY: TRANSPORTATION INSECURITY
IN THE PAST 12 MONTHS, HAS THE LACK OF TRANSPORTATION KEPT YOU FROM MEDICAL APPOINTMENTS OR FROM GETTING MEDICATIONS?: NO

## 2024-02-09 SDOH — ECONOMIC STABILITY: TRANSPORTATION INSECURITY
IN THE PAST 12 MONTHS, HAS LACK OF TRANSPORTATION KEPT YOU FROM MEETINGS, WORK, OR FROM GETTING THINGS NEEDED FOR DAILY LIVING?: NO

## 2024-02-09 SDOH — HEALTH STABILITY: MENTAL HEALTH
STRESS IS WHEN SOMEONE FEELS TENSE, NERVOUS, ANXIOUS, OR CAN'T SLEEP AT NIGHT BECAUSE THEIR MIND IS TROUBLED. HOW STRESSED ARE YOU?: NOT AT ALL

## 2024-02-09 SDOH — ECONOMIC STABILITY: TRANSPORTATION INSECURITY
IN THE PAST 12 MONTHS, HAS LACK OF RELIABLE TRANSPORTATION KEPT YOU FROM MEDICAL APPOINTMENTS, MEETINGS, WORK OR FROM GETTING THINGS NEEDED FOR DAILY LIVING?: NO

## 2024-02-09 ASSESSMENT — SOCIAL DETERMINANTS OF HEALTH (SDOH)
IN A TYPICAL WEEK, HOW MANY TIMES DO YOU TALK ON THE PHONE WITH FAMILY, FRIENDS, OR NEIGHBORS?: MORE THAN THREE TIMES A WEEK
HOW OFTEN DO YOU ATTENT MEETINGS OF THE CLUB OR ORGANIZATION YOU BELONG TO?: NEVER
HOW MANY DRINKS CONTAINING ALCOHOL DO YOU HAVE ON A TYPICAL DAY WHEN YOU ARE DRINKING: 1 OR 2
DO YOU BELONG TO ANY CLUBS OR ORGANIZATIONS SUCH AS CHURCH GROUPS UNIONS, FRATERNAL OR ATHLETIC GROUPS, OR SCHOOL GROUPS?: NO
DO YOU BELONG TO ANY CLUBS OR ORGANIZATIONS SUCH AS CHURCH GROUPS UNIONS, FRATERNAL OR ATHLETIC GROUPS, OR SCHOOL GROUPS?: NO
HOW OFTEN DO YOU ATTEND CHURCH OR RELIGIOUS SERVICES?: PATIENT DECLINED
HOW OFTEN DO YOU ATTENT MEETINGS OF THE CLUB OR ORGANIZATION YOU BELONG TO?: NEVER
WITHIN THE PAST 12 MONTHS, YOU WORRIED THAT YOUR FOOD WOULD RUN OUT BEFORE YOU GOT THE MONEY TO BUY MORE: NEVER TRUE
HOW OFTEN DO YOU HAVE SIX OR MORE DRINKS ON ONE OCCASION: NEVER
HOW OFTEN DO YOU HAVE A DRINK CONTAINING ALCOHOL: MONTHLY OR LESS
HOW OFTEN DO YOU GET TOGETHER WITH FRIENDS OR RELATIVES?: MORE THAN THREE TIMES A WEEK
HOW OFTEN DO YOU GET TOGETHER WITH FRIENDS OR RELATIVES?: MORE THAN THREE TIMES A WEEK
IN A TYPICAL WEEK, HOW MANY TIMES DO YOU TALK ON THE PHONE WITH FAMILY, FRIENDS, OR NEIGHBORS?: MORE THAN THREE TIMES A WEEK
HOW OFTEN DO YOU ATTEND CHURCH OR RELIGIOUS SERVICES?: PATIENT DECLINED

## 2024-02-09 ASSESSMENT — LIFESTYLE VARIABLES
HOW OFTEN DO YOU HAVE SIX OR MORE DRINKS ON ONE OCCASION: NEVER
SKIP TO QUESTIONS 9-10: 1
AUDIT-C TOTAL SCORE: 1
HOW MANY STANDARD DRINKS CONTAINING ALCOHOL DO YOU HAVE ON A TYPICAL DAY: 1 OR 2
HOW OFTEN DO YOU HAVE A DRINK CONTAINING ALCOHOL: MONTHLY OR LESS

## 2024-02-09 NOTE — RESEARCH NOTE
Saliva sample collection kit mailed to:    [address] [facility] [address]    Return  tracking #:  [id number]

## 2024-02-09 NOTE — RESEARCH NOTE
Patient initially participated in March of 2018. Patient contacted [employer] to have health results added onto the [employer] account. Patient was informed by [employer] that re-testing will need to be done. Patient would like to have a kit mailed.     Confirmed with the participant which designated provider they would like study results shared with (Dr. Smith). Patient will have an opportunity to share the results with any providers of their choosing in the future by accessing their results from [facility].

## 2024-02-13 ENCOUNTER — OFFICE VISIT (OUTPATIENT)
Dept: MEDICAL GROUP | Facility: PHYSICIAN GROUP | Age: 74
End: 2024-02-13
Payer: MEDICARE

## 2024-02-13 VITALS
BODY MASS INDEX: 26.84 KG/M2 | OXYGEN SATURATION: 96 % | DIASTOLIC BLOOD PRESSURE: 60 MMHG | SYSTOLIC BLOOD PRESSURE: 102 MMHG | WEIGHT: 157.19 LBS | TEMPERATURE: 98.3 F | HEIGHT: 64 IN | RESPIRATION RATE: 14 BRPM | HEART RATE: 80 BPM

## 2024-02-13 DIAGNOSIS — E78.2 MIXED HYPERLIPIDEMIA: ICD-10-CM

## 2024-02-13 DIAGNOSIS — E03.8 OTHER SPECIFIED HYPOTHYROIDISM: ICD-10-CM

## 2024-02-13 DIAGNOSIS — Z85.828 HX OF BASAL CELL CARCINOMA: ICD-10-CM

## 2024-02-13 DIAGNOSIS — I48.0 PAF (PAROXYSMAL ATRIAL FIBRILLATION) (HCC): ICD-10-CM

## 2024-02-13 DIAGNOSIS — H35.30 AMD (AGE-RELATED MACULAR DEGENERATION), BILATERAL: ICD-10-CM

## 2024-02-13 PROBLEM — M79.672 LEFT FOOT PAIN: Status: RESOLVED | Noted: 2023-01-30 | Resolved: 2024-02-13

## 2024-02-13 PROBLEM — G89.18 POST-OP PAIN: Status: RESOLVED | Noted: 2018-06-28 | Resolved: 2024-02-13

## 2024-02-13 PROBLEM — S83.241D OTHER TEAR OF MEDIAL MENISCUS, CURRENT INJURY, RIGHT KNEE, SUBSEQUENT ENCOUNTER: Status: RESOLVED | Noted: 2017-12-28 | Resolved: 2024-02-13

## 2024-02-13 PROBLEM — Z96.651 HX OF TOTAL KNEE ARTHROPLASTY, RIGHT: Status: ACTIVE | Noted: 2017-12-28

## 2024-02-13 PROBLEM — M79.671 RIGHT FOOT PAIN: Status: RESOLVED | Noted: 2023-01-30 | Resolved: 2024-02-13

## 2024-02-13 PROCEDURE — 3078F DIAST BP <80 MM HG: CPT | Performed by: STUDENT IN AN ORGANIZED HEALTH CARE EDUCATION/TRAINING PROGRAM

## 2024-02-13 PROCEDURE — 3074F SYST BP LT 130 MM HG: CPT | Performed by: STUDENT IN AN ORGANIZED HEALTH CARE EDUCATION/TRAINING PROGRAM

## 2024-02-13 PROCEDURE — 99214 OFFICE O/P EST MOD 30 MIN: CPT | Performed by: STUDENT IN AN ORGANIZED HEALTH CARE EDUCATION/TRAINING PROGRAM

## 2024-02-13 ASSESSMENT — ENCOUNTER SYMPTOMS
COUGH: 0
SHORTNESS OF BREATH: 0
CHILLS: 0
FOCAL WEAKNESS: 0
ORTHOPNEA: 0
ABDOMINAL PAIN: 0
FEVER: 0
VOMITING: 0
WHEEZING: 0
PALPITATIONS: 0
HEADACHES: 0
NAUSEA: 0
DIZZINESS: 0

## 2024-02-13 ASSESSMENT — FIBROSIS 4 INDEX: FIB4 SCORE: 1.59

## 2024-02-13 ASSESSMENT — PATIENT HEALTH QUESTIONNAIRE - PHQ9: CLINICAL INTERPRETATION OF PHQ2 SCORE: 0

## 2024-02-13 NOTE — LETTER
[facility]  Guillermina Song D.O.  [address]  [facility] [address]  Fax: [phone]   Authorization for Release/Disclosure of   Protected Health Information   Name: EDILMA TAYLOR : 1950 SSN: [id number]   Address: [address] [place] [id number] Phone:    [phone] (home)    I authorize the entity listed below to release/disclose the PHI below to:   [facility]/Guillermina Song D.O. and Guillermina Song D.O.   Provider or Entity Name:  [facility]    Address   City, State, Zip   Phone:      Fax:     Reason for request: continuity of care   Information to be released:    [ x ] LAST COLONOSCOPY,  including any PATH REPORT and follow-up  [  ] LAST FIT/COLOGUARD RESULT [  ] LAST DEXA  [  ] LAST MAMMOGRAM  [  ] LAST PAP  [  ] LAST LABS [  ] RETINA EXAM REPORT  [  ] IMMUNIZATION RECORDS  [  ] Release all info      [  ] Check here and initial the line next to each item to release ALL health information INCLUDING  _____ Care and treatment for drug and / or alcohol abuse  _____ HIV testing, infection status, or AIDS  _____ Genetic Testing    DATES OF SERVICE OR TIME PERIOD TO BE DISCLOSED: _____________  I understand and acknowledge that:  * This Authorization may be revoked at any time by you in writing, except if your health information has already been used or disclosed.  * Your health information that will be used or disclosed as a result of you signing this authorization could be re-disclosed by the recipient. If this occurs, your re-disclosed health information may no longer be protected by State or Federal laws.  * You may refuse to sign this Authorization. Your refusal will not affect your ability to obtain treatment.  * This Authorization becomes effective upon signing and will  on (date) __________.      If no date is indicated, this Authorization will  one (1) year from the signature date.    Name: Edilma Taylor  Signature: Date:   2024      PLEASE FAX REQUESTED RECORDS BACK TO: [phone]

## 2024-02-13 NOTE — PROGRESS NOTES
Subjective:   HISTORY OF THE PRESENT ILLNESS: Patient is a 73 y.o. female here today to establish care. His/her prior PCP was Franky Sapp.    Problem   Other Specified Hypothyroidism    Stable on 50mcg      Hx of Basal Cell Carcinoma   Mixed Hyperlipidemia   Paf (Paroxysmal Atrial Fibrillation) (Hcc)    S/p ablation 5/2023.   Sees cardiology Dr. Donell Flaherty and kandice sapp.     Taking eliquis,      Hx of Total Knee Arthroplasty, Right   Amd (Age-Related Macular Degeneration), Bilateral    Dr. Vidales      Left Foot Pain (Resolved)    Added automatically from request for surgery [id number]     Right Foot Pain (Resolved)    Added automatically from request for surgery [id number]     Post-Op Pain (Resolved)   Other Tear of Medial Meniscus, Current Injury, Right Knee, Subsequent Encounter (Resolved)   Left Hand Pain (Resolved)          Review of systems:  Review of Systems   Constitutional:  Negative for chills and fever.   Respiratory:  Negative for cough, shortness of breath and wheezing.    Cardiovascular:  Negative for chest pain, palpitations, orthopnea and leg swelling.   Gastrointestinal:  Negative for abdominal pain, melena, nausea and vomiting.   Genitourinary:  Negative for dysuria and urgency.   Musculoskeletal:  Negative for joint pain.   Neurological:  Negative for dizziness, focal weakness and headaches.         Patient Active Problem List    Diagnosis Date Noted    Other specified hypothyroidism 02/13/2024    Hx of basal cell carcinoma 02/13/2024    Mixed hyperlipidemia 06/19/2023    S/P ablation of atrial fibrillation/flutter 05/30/2023    PAF (paroxysmal atrial fibrillation) (HCC) 06/29/2022    Anticoagulated 06/29/2022    Primary osteoarthritis of right knee 06/28/2018    Hx of total knee arthroplasty, right 12/28/2017    Osteoarthritis of left thumb 10/22/2015    AMD (age-related macular degeneration), bilateral 03/31/2015     Past Surgical History:   Procedure Laterality Date    TN TENOTOMY PERC  TOE MULTIPLE TENDONS Bilateral 03/06/2023    Procedure: LEFT THIRD AND FOURTH TOE FLEXOR TENOTOMY, RIGHT THIRD, FOURTH AND FIFTH TOE FLEXOR TENOTOMY;  Surgeon: Noel Mansfield M.D.;  Location: [facility];  Service: Orthopedics    PB REPAIR OF MELLYERTOE,ONE Bilateral 03/06/2023    Procedure: LEFT DISTAL INTERPHALANGEAL JOINT FUSION, RIGHT THIRD AND FOURTH DISTAL INTERPHALANGEAL JOINT FUSION,  RIGHT FIFTH PROXIMAL INTERPHALANGEAL FUSION, RIGHT DISTAL PHALANX EXOSECTOMY;  Surgeon: Noel Mansfield M.D.;  Location: [facility];  Service: Orthopedics    KNEE ARTHROPLASTY TOTAL Right 06/27/2018    Procedure: KNEE ARTHROPLASTY TOTAL;  Surgeon: Sindi Ochoa M.D.;  Location: SURGERY [facility];  Service: Orthopedics    KNEE ARTHROSCOPY Right 12/28/2017    x 2    KNEE ARTHROSCOPY Right 12/28/2017    Procedure: RT KNEE ARTHROSCOPY, Chondroplasty, Partial Meniscectomy;  Surgeon: Jaswant Velez M.D.;  Location: SURGERY [facility];  Service: Orthopedics    CATARACT EXTRACTION WITH IOL Bilateral 2016    PTOSIS REPAIR  03/31/2015    Performed by Alfredo Marcus M.D. at SURGERY [facility]    HYSTERECTOMY, TOTAL ABDOMINAL  1992    APPENDECTOMY  1992    CARPAL TUNNEL RELEASE Bilateral 1991    BUNIONECTOMY Bilateral 1988    ABDOMINAL HYSTERECTOMY TOTAL      CHOLECYSTECTOMY      DILATION AND CURETTAGE      EYE SURGERY      FOOT SURGERY  2012, 3/2023    [facility]    HAND SURGERY  12/2011    ORIF, KNEE  06/2018    Total knee replace     Social History     Tobacco Use    Smoking status: Never     Passive exposure: Past    Smokeless tobacco: Never   Vaping Use    Vaping Use: Never used   Substance Use Topics    Alcohol use: Yes     Alcohol/week: 0.6 oz     Types: 1 Glasses of wine per week     Comment: wine with some meals    Drug use: No      Family History   Problem Relation Age of Onset    Heart Disease Mother     Hypertension Mother     Hyperlipidemia Mother      Stroke Mother     Lung Disease Father     Cancer Father      Current Outpatient Medications   Medication Sig Dispense Refill    clindamycin (CLEOCIN) 150 MG Cap       montelukast (SINGULAIR) 10 MG Tab Take 10 mg by mouth.      meloxicam (MOBIC) 15 MG tablet       hydrocortisone 2.5 % Cream topical cream PLEASE SEE ATTACHED FOR DETAILED DIRECTIONS      COLLAGEN PO Collagen      Coenzyme Q10 (COQ10 PO) CoQ10      B Complex-Folic Acid (SUPER B COMPLEX MAXI PO) as directed Orally      Calcium-Vitamin D-Vitamin K (CALCIUM + D) 500-1000-40 MG-UNT-MCG Chew Tab as directed Orally      estradiol (ESTRACE) 0.5 MG tablet Take 1 Tablet by mouth every day.      MegaRed Omega-3 Krill Oil 500 MG Cap as directed Orally      loratadine (CLARITIN) 10 MG Tab 1 tab(s) orally PRN for 30 day(s)      Resveratrol 250 MG Cap as directed Orally      rosuvastatin (CRESTOR) 5 MG Tab Take 1 Tablet by mouth every day.      Turmeric 500 MG Cap as directed Orally      Multiple Vitamins-Minerals (PRESERVISION AREDS 2) Cap as directed Orally      ketoconazole (NIZORAL) 2 % shampoo       apixaban (ELIQUIS) 5mg Tab Take 1 Tablet by mouth 2 times a day.      levothyroxine (SYNTHROID) 50 MCG Tab 1 TABLET IN THE MORNING ON AN EMPTY STOMACH ORALLY ONCE A DAY      Nutritional Supplements (JOINT FORMULA PO) Take 1 Tab by mouth every day.      ascorbic acid (ASCORBIC ACID) 500 MG TABS Take 1,000 mg by mouth every day.      Non Formulary Request Take 1 Cap by mouth every day. EYE VITAMIN      Probiotic Product (PROBIOTIC DAILY PO) Take 1 Cap by mouth every day.      Non Formulary Request Take 1 Cap by mouth every day. VITAMIN D + CALCIUM       No current facility-administered medications for this visit.       Allergies:  Allergies   Allergen Reactions    Gabapentin Palpitations    Kenalog Rash     INJECTION  CAUSES VERTIGO / THRUSH  Other reaction(s): Flushing    Penicillins Hives and Rash     HIVES    HIVES    Triamcinolone Nausea, Palpitations and Rash  "    Other reaction(s): Dizziness, Flushing  INJECTION  CAUSES VERTIGO / THRUSH- kenolog  Other reaction(s): Dizziness, Flushing, flushing  INJECTION  CAUSES VERTIGO / THRUSH- kenolog      Doxycycline Unspecified and Rash     \"Hurts to swallow\"  Other reaction(s): Other  Other reaction(s): .Unknown, other, Unknown  Esophagus swelling      Morphine Nausea and Vomiting     EXTREME VOMITING  Other reaction(s): GI Upset  EXTREME VOMITING    Sulfa Drugs      EXTREME MUCOUS PRODUCTION  Other reaction(s): Rhinitis  EXTREME MUCOUS PRODUCTION  Other reaction(s): Not available  Other reaction(s): Unknown    Kdc:Benzyl Alcohol+Sorbitan+Triamcinolone      Other reaction(s): Unknown    Penicillin G      Other reaction(s): Unknown       Allergies, past medical history, past surgical history, family history, social history reviewed and updated.    Objective:    /60 (BP Location: Right arm, Patient Position: Sitting, BP Cuff Size: Adult)   Pulse 80   Temp 36.8 °C (98.3 °F) (Temporal)   Resp 14   Ht 1.626 m (5' 4\")   Wt 71.3 kg (157 lb 3 oz)   SpO2 96%   BMI 26.98 kg/m²    Body mass index is 26.98 kg/m².    Physical exam:  General: Normal appearance, no acute distress, not ill-appearing  HEENT: EOM intact, conjunctiva normal limits, negative right/left eye discharge.  Sclera anicteric, TM within normal limits bilaterally  Cardiovascular: Normal rate and rhythm, no murmurs  Pulmonary: No respiratory distress, no wheezing, no rales, breath sounds normal.  Musculoskeletal: No edema bilaterally  Skin: Warm, dry, no lesions  Neurological: No focal deficits, normal gait  Psychiatric: Mood within normal limits    Assessment/Plan:    Patient here for a preventive medicine visit today and to establish care.  -Reviewed all past medical history, family history, social history    -Diet and exercise appropriate counseling given  -Social determinants of health reviewed  -Tobacco, alcohol, recreational drug use: reviewed no " concerns  -Cholesterol screening: Reviewed from 2023  -Diabetes screenin months ago 5.0    - DEXA scan: Not indicated, completed 2022 within normal limits.    Immunizations/Infectious disease:    Immunizations: Declines influenza, COVID    Cancer screenings:  Colorectal cancer screenin, advised to rtc in 10 years   Cervical Cancer Screening:  aged out  Breast Cancer Screening: done 2023 .          ----BRCA SCREENING: FHS-7 score:  Lung Cancer Screening: no tindicated     Ob-Gyn/ History:     Hysterectomy age 41    Problem List Items Addressed This Visit       AMD (age-related macular degeneration), bilateral     Follows with ophthalmology         PAF (paroxysmal atrial fibrillation) (HCC)     Status post ablation.  Followed by [facility]y Diaz cardiology.  On Eliquis 5 mg twice daily         Mixed hyperlipidemia     Crestor 5 mg daily.  Chronic, stable condition         Other specified hypothyroidism    Hx of basal cell carcinoma     Follows with dermatology             Patient Counseling:  --Discussed moderation in caloric intake, sufficient fresh fruits/vegetables, fiber, iron  --Discussed brushing, flossing, and dental visits.   --Encouraged regular exercise.   --Discussed tobacco, alcohol, or other drug use.  --Discussed sexually transmitted infections, partner selection, use of condoms, avoidance of unintended pregnancy and contraceptive alternatives.  --Injury prevention: Discussed     Return in about 6 months (around 2024).

## 2024-03-07 ENCOUNTER — PATIENT MESSAGE (OUTPATIENT)
Dept: CARDIOLOGY | Facility: MEDICAL CENTER | Age: 74
End: 2024-03-07
Payer: MEDICARE

## 2024-03-08 NOTE — PROGRESS NOTES
She can permanently stop it. If she wants to stay on like she said before she can stop three to four days before the procedure and restart after if she wants.

## 2024-03-14 LAB
APOB+LDLR+PCSK9 GENE MUT ANL BLD/T: NOT DETECTED
BRCA1+BRCA2 DEL+DUP + FULL MUT ANL BLD/T: NOT DETECTED
MLH1+MSH2+MSH6+PMS2 GN DEL+DUP+FUL M: NOT DETECTED

## 2024-09-23 ENCOUNTER — OFFICE VISIT (OUTPATIENT)
Dept: URGENT CARE | Facility: CLINIC | Age: 74
End: 2024-09-23
Payer: MEDICARE

## 2024-09-23 VITALS
RESPIRATION RATE: 16 BRPM | HEIGHT: 63 IN | HEART RATE: 77 BPM | WEIGHT: 146 LBS | DIASTOLIC BLOOD PRESSURE: 48 MMHG | OXYGEN SATURATION: 97 % | BODY MASS INDEX: 25.87 KG/M2 | SYSTOLIC BLOOD PRESSURE: 102 MMHG | TEMPERATURE: 99.2 F

## 2024-09-23 DIAGNOSIS — U07.1 COVID-19 VIRUS INFECTION: Primary | ICD-10-CM

## 2024-09-23 DIAGNOSIS — R05.8 COUGH WITH EXPECTORATION: ICD-10-CM

## 2024-09-23 DIAGNOSIS — J02.9 SORE THROAT: ICD-10-CM

## 2024-09-23 LAB
FLUAV RNA SPEC QL NAA+PROBE: NEGATIVE
FLUBV RNA SPEC QL NAA+PROBE: NEGATIVE
RSV RNA SPEC QL NAA+PROBE: NEGATIVE
S PYO DNA SPEC NAA+PROBE: NOT DETECTED
SARS-COV-2 RNA RESP QL NAA+PROBE: POSITIVE

## 2024-09-23 PROCEDURE — 3074F SYST BP LT 130 MM HG: CPT | Performed by: NURSE PRACTITIONER

## 2024-09-23 PROCEDURE — 0241U POCT CEPHEID COV-2, FLU A/B, RSV - PCR: CPT | Performed by: NURSE PRACTITIONER

## 2024-09-23 PROCEDURE — 3078F DIAST BP <80 MM HG: CPT | Performed by: NURSE PRACTITIONER

## 2024-09-23 PROCEDURE — 87651 STREP A DNA AMP PROBE: CPT | Performed by: NURSE PRACTITIONER

## 2024-09-23 PROCEDURE — 99214 OFFICE O/P EST MOD 30 MIN: CPT | Performed by: NURSE PRACTITIONER

## 2024-09-23 ASSESSMENT — FIBROSIS 4 INDEX: FIB4 SCORE: 1.78

## 2024-09-23 NOTE — PROGRESS NOTES
"Edilma Taylor is a 73 y.o. female who presents for Cough (Fever,cough, congestion, bodyaches, sore throat, fever 102)      HPI  This is a new problem. Edilma Taylor is a 73 y.o. patient who presents to urgent care with c/o: fever, sore throat, cough and congestion. Fever yesterday 101*F to 102*F all day. Taking tylenol prn .  Has sick grandchild that she has been around. Wants to make sure she does not have strep throat or covid.  Denies c/p, sob, NVD, ear pain, dizziness, weakness. No other aggravating or alleviating factors.  Denies any other concerns at this time.         ROS See HPI    Allergies:       Allergies   Allergen Reactions    Gabapentin Palpitations    Kenalog Rash     INJECTION  CAUSES VERTIGO / THRUSH  Other reaction(s): Flushing    Penicillins Hives and Rash     HIVES    HIVES    Triamcinolone Nausea, Palpitations and Rash     Other reaction(s): Dizziness, Flushing  INJECTION  CAUSES VERTIGO / THRUSH- kenolog  Other reaction(s): Dizziness, Flushing, flushing  INJECTION  CAUSES VERTIGO / THRUSH- kenolog      Doxycycline Unspecified and Rash     \"Hurts to swallow\"  Other reaction(s): Other  Other reaction(s): .Unknown, other, Unknown  Esophagus swelling      Morphine Nausea and Vomiting     EXTREME VOMITING  Other reaction(s): GI Upset  EXTREME VOMITING    Sulfa Drugs      EXTREME MUCOUS PRODUCTION  Other reaction(s): Rhinitis  EXTREME MUCOUS PRODUCTION  Other reaction(s): Not available  Other reaction(s): Unknown    Penicillin G      Other reaction(s): Unknown       PMSFS Hx:  Past Medical History:   Diagnosis Date    Allergy     Anesthesia     WAKES UP SLOWLY     hypotensive episode 2015    Arrhythmia     Arthritis     osteoarthritis in joints \"knees especially\"    Blood transfusion without reported diagnosis     Cancer (HCC) 1999    skin basal and squamous    Cataract     IOL bilateral    Delayed emergence from general anesthesia     Hyperlipidemia     Pain     knee pain    Sensitive " to concentration of IV drug     and oral drugs    Thyroid activity decreased     takes suppliment    Transfusion history 1974    hemorrage after D&C    Unspecified hemorrhagic conditions 1980s    from taking too much aspirin     Past Surgical History:   Procedure Laterality Date    ID TENOTOMY PERC TOE MULTIPLE TENDONS Bilateral 03/06/2023    Procedure: LEFT THIRD AND FOURTH TOE FLEXOR TENOTOMY, RIGHT THIRD, FOURTH AND FIFTH TOE FLEXOR TENOTOMY;  Surgeon: Noel Mansfield M.D.;  Location: [facility];  Service: Orthopedics    PB REPAIR OF HAMMERTOE,ONE Bilateral 03/06/2023    Procedure: LEFT DISTAL INTERPHALANGEAL JOINT FUSION, RIGHT THIRD AND FOURTH DISTAL INTERPHALANGEAL JOINT FUSION,  RIGHT FIFTH PROXIMAL INTERPHALANGEAL FUSION, RIGHT DISTAL PHALANX EXOSECTOMY;  Surgeon: Noel Mansfield M.D.;  Location: [facility];  Service: Orthopedics    KNEE ARTHROPLASTY TOTAL Right 06/27/2018    Procedure: KNEE ARTHROPLASTY TOTAL;  Surgeon: Sindi Ochoa M.D.;  Location: SURGERY [facility];  Service: Orthopedics    KNEE ARTHROSCOPY Right 12/28/2017    x 2    KNEE ARTHROSCOPY Right 12/28/2017    Procedure: RT KNEE ARTHROSCOPY, Chondroplasty, Partial Meniscectomy;  Surgeon: Jaswant Velez M.D.;  Location: SURGERY [facility];  Service: Orthopedics    CATARACT EXTRACTION WITH IOL Bilateral 2016    PTOSIS REPAIR  03/31/2015    Performed by Alfredo Marcus M.D. at SURGERY [facility]    HYSTERECTOMY, TOTAL ABDOMINAL  1992    APPENDECTOMY  1992    CARPAL TUNNEL RELEASE Bilateral 1991    BUNIONECTOMY Bilateral 1988    ABDOMINAL HYSTERECTOMY TOTAL      CHOLECYSTECTOMY      DILATION AND CURETTAGE      EYE SURGERY      [facility]    HAND SURGERY  12/2011    ORIF, KNEE  06/2018    Total knee replace     Family History   Problem Relation Age of Onset    Heart Disease Mother     Hypertension Mother     Hyperlipidemia Mother     Stroke  Mother     Lung Disease Father     Cancer Father      Social History     Tobacco Use    Smoking status: Never     Passive exposure: Past    Smokeless tobacco: Never   Substance Use Topics    Alcohol use: Yes     Alcohol/week: 0.6 oz     Types: 1 Glasses of wine per week     Comment: wine with some meals       Problems:   Patient Active Problem List   Diagnosis    AMD (age-related macular degeneration), bilateral    Osteoarthritis of left thumb    Hx of total knee arthroplasty, right    Primary osteoarthritis of right knee    PAF (paroxysmal atrial fibrillation) (HCC)    Anticoagulated    S/P ablation of atrial fibrillation/flutter    Mixed hyperlipidemia    Other specified hypothyroidism    Hx of basal cell carcinoma       Medications:   Current Outpatient Medications on File Prior to Visit   Medication Sig Dispense Refill    apixaban (ELIQUIS) 5mg Tab Take 1 Tablet by mouth 2 times a day.      hydrocortisone 2.5 % Cream topical cream Apply 1 Application topically.      montelukast (SINGULAIR) 10 MG Tab Take 10 mg by mouth.      Resveratrol 250 MG Cap 250 mg.      meloxicam (MOBIC) 15 MG tablet 1 tablet PO daily with food. No advil/aleve/motrin/ibuprofen on same day. 60 Tablet 1    clindamycin (CLEOCIN) 150 MG Cap       hydrocortisone 2.5 % Cream topical cream PLEASE SEE ATTACHED FOR DETAILED DIRECTIONS      COLLAGEN PO Collagen      Coenzyme Q10 (COQ10 PO) CoQ10      B Complex-Folic Acid (SUPER B COMPLEX MAXI PO) as directed Orally      Calcium-Vitamin D-Vitamin K (CALCIUM + D) 500-1000-40 MG-UNT-MCG Chew Tab as directed Orally      estradiol (ESTRACE) 0.5 MG tablet Take 1 Tablet by mouth every day.      MegaRed Omega-3 Krill Oil 500 MG Cap as directed Orally      loratadine (CLARITIN) 10 MG Tab 1 tab(s) orally PRN for 30 day(s)      rosuvastatin (CRESTOR) 5 MG Tab Take 1 Tablet by mouth every day.      Turmeric 500 MG Cap as directed Orally      Multiple Vitamins-Minerals (PRESERVISION AREDS 2) Cap as directed  "Orally      ketoconazole (NIZORAL) 2 % shampoo       levothyroxine (SYNTHROID) 50 MCG Tab 1 TABLET IN THE MORNING ON AN EMPTY STOMACH ORALLY ONCE A DAY      Nutritional Supplements (JOINT FORMULA PO) Take 1 Tab by mouth every day.      ascorbic acid (ASCORBIC ACID) 500 MG TABS Take 1,000 mg by mouth every day.      Non Formulary Request Take 1 Cap by mouth every day. EYE VITAMIN      Probiotic Product (PROBIOTIC DAILY PO) Take 1 Cap by mouth every day.      Non Formulary Request Take 1 Cap by mouth every day. VITAMIN D + CALCIUM       No current facility-administered medications on file prior to visit.        Objective:     /48 (BP Location: Left arm, Patient Position: Sitting, BP Cuff Size: Adult)   Pulse 77   Temp 37.3 °C (99.2 °F) (Temporal)   Resp 16   Ht 1.6 m (5' 3\")   Wt 66.2 kg (146 lb)   SpO2 97%   BMI 25.86 kg/m²     Physical Exam  Vitals and nursing note reviewed.   Constitutional:       General: She is not in acute distress.     Appearance: Normal appearance. She is well-developed. She is not ill-appearing or toxic-appearing.   HENT:      Head: Normocephalic.      Right Ear: Hearing normal. No middle ear effusion. Tympanic membrane is injected. Tympanic membrane is not erythematous.      Left Ear: Hearing normal.  No middle ear effusion. Tympanic membrane is injected. Tympanic membrane is not erythematous.      Nose: No mucosal edema or rhinorrhea.      Right Sinus: No maxillary sinus tenderness or frontal sinus tenderness.      Left Sinus: No maxillary sinus tenderness or frontal sinus tenderness.      Mouth/Throat:      Pharynx: Uvula midline. No posterior oropharyngeal erythema.      Tonsils: No tonsillar abscesses.   Neck:      Trachea: Trachea normal.   Cardiovascular:      Rate and Rhythm: Normal rate and regular rhythm.      Chest Wall: PMI is not displaced.      Pulses: Normal pulses.      Heart sounds: Normal heart sounds.   Pulmonary:      Effort: Pulmonary effort is normal. No " respiratory distress.      Breath sounds: Normal breath sounds. No decreased breath sounds, wheezing, rhonchi or rales.   Musculoskeletal:         General: Normal range of motion.      Cervical back: Full passive range of motion without pain, normal range of motion and neck supple.   Lymphadenopathy:      Cervical: No cervical adenopathy.      Upper Body:      Right upper body: No supraclavicular adenopathy.      Left upper body: No supraclavicular adenopathy.   Skin:     General: Skin is warm and dry.      Capillary Refill: Capillary refill takes less than 2 seconds.   Neurological:      Mental Status: She is alert and oriented to person, place, and time.      Gait: Gait normal.   Psychiatric:         Mood and Affect: Mood normal.         Speech: Speech normal.         Behavior: Behavior normal. Behavior is cooperative.       Results for orders placed or performed in visit on 09/23/24   POCT Cepheid CoV-2, Flu A/B, RSV - PCR   Result Value Ref Range    SARS-CoV-2 by PCR Positive (A) Negative, Invalid    Influenza virus A RNA Negative Negative, Invalid    Influenza virus B, PCR Negative Negative, Invalid    RSV, PCR Negative Negative, Invalid   POCT Cepheid Group A Strep - PCR   Result Value Ref Range    POC Group A Strep, PCR Not Detected Not Detected, Invalid         Assessment /Associated Orders:      1. COVID-19 virus infection        2. Cough with expectoration  POCT Cepheid CoV-2, Flu A/B, RSV - PCR      3. Sore throat  POCT Cepheid Group A Strep - PCR            Medical Decision Making:    Edilma  is a very pleasant 73 y.o. female who is clinically stable at today's acute urgent care visit.  No acute distress noted.  VSS. Appropriate for outpatient care at this time.   Acute problem today with uncertain prognosis. + covid test today.   Declines antiviral medication to tx covid infection and help reduce her symptoms.   Discussed that this illness is viral in nature. I did not see any evidence of a bacterial  infection on exam today.   OTC medications can be used for symptom relief. Follow manufacturers guidelines for dosing and instructions.  These OTC medications will not make you better any faster, but can help reduce your discomfort.   OTC Antipyretic of choice (Acetaminophen, Ibuprofen) for fevers greater than or equal to 101.5 * F or 38.6*C   Keep well hydrated  Increase rest  Quarantine per the CDC guidelines   The recommendations suggest returning to normal activities when, for at least 24 hours, symptoms are improving overall, and if a fever was present, it has been gone without use of a fever-reducing medication.  Educated in infection control practices.       Discussed Dx, management options (risks,benefits, and alternatives to planned treatment), natural progression and supportive care.  Expressed understanding and the treatment plan was agreed upon.   Questions were encouraged and answered   Return to urgent care prn if new or worsening sx or if there is no improvement in condition prn.    Educated in Red flags and indications to immediately call 911 or present to the Emergency Department. Monitor your symptoms. If you have an emergency warning sign (like trouble breathing), seek emergency medical care immediately.      Time I spent evaluating Edilma Taylor in urgent care today was 30  minutes. This time includes preparing for visit, reviewing any pertinent notes or test results, counseling/education, exam, obtaining HPI, interpretation of lab tests, medication management and documentation as indicated above.Time does not include separately billable procedures noted .       Please note that this dictation was created using voice recognition software. I have worked with consultants from the vendor as well as technical experts from [facility] to optimize the interface. I have made every reasonable attempt to correct obvious errors, but I expect that there are errors of grammar and possibly content  that I did not discover before finalizing the note.  This note was electronically signed by provider

## 2024-09-24 DIAGNOSIS — U07.1 COVID-19 VIRUS INFECTION: ICD-10-CM

## 2024-09-25 ENCOUNTER — APPOINTMENT (OUTPATIENT)
Dept: CARDIOLOGY | Facility: MEDICAL CENTER | Age: 74
End: 2024-09-25
Attending: INTERNAL MEDICINE
Payer: MEDICARE

## 2024-11-04 ENCOUNTER — APPOINTMENT (OUTPATIENT)
Dept: CARDIOLOGY | Facility: MEDICAL CENTER | Age: 74
End: 2024-11-04
Payer: MEDICARE

## 2024-11-04 VITALS
WEIGHT: 148 LBS | RESPIRATION RATE: 14 BRPM | OXYGEN SATURATION: 98 % | DIASTOLIC BLOOD PRESSURE: 62 MMHG | SYSTOLIC BLOOD PRESSURE: 104 MMHG | HEIGHT: 63 IN | BODY MASS INDEX: 26.22 KG/M2 | HEART RATE: 73 BPM

## 2024-11-04 DIAGNOSIS — Z98.890 S/P ABLATION OF ATRIAL FIBRILLATION: ICD-10-CM

## 2024-11-04 DIAGNOSIS — E78.2 MIXED HYPERLIPIDEMIA: ICD-10-CM

## 2024-11-04 DIAGNOSIS — Z86.79 S/P ABLATION OF ATRIAL FIBRILLATION: ICD-10-CM

## 2024-11-04 DIAGNOSIS — I48.0 PAF (PAROXYSMAL ATRIAL FIBRILLATION) (HCC): ICD-10-CM

## 2024-11-04 LAB — EKG IMPRESSION: NORMAL

## 2024-11-04 PROCEDURE — 99214 OFFICE O/P EST MOD 30 MIN: CPT | Performed by: INTERNAL MEDICINE

## 2024-11-04 PROCEDURE — 93010 ELECTROCARDIOGRAM REPORT: CPT | Performed by: INTERNAL MEDICINE

## 2024-11-04 PROCEDURE — 3078F DIAST BP <80 MM HG: CPT | Performed by: INTERNAL MEDICINE

## 2024-11-04 PROCEDURE — 99213 OFFICE O/P EST LOW 20 MIN: CPT | Performed by: INTERNAL MEDICINE

## 2024-11-04 PROCEDURE — 3074F SYST BP LT 130 MM HG: CPT | Performed by: INTERNAL MEDICINE

## 2024-11-04 PROCEDURE — 93005 ELECTROCARDIOGRAM TRACING: CPT | Performed by: INTERNAL MEDICINE

## 2024-11-04 ASSESSMENT — FIBROSIS 4 INDEX: FIB4 SCORE: 1.8

## 2024-11-04 NOTE — PROGRESS NOTES
"Chief Complaint   Patient presents with    Atrial Fibrillation     F/v Dx:PAF (paroxysmal atrial fibrillation) (HCC)       Subjective     Edilma Taylor is a 74 y.o. female who presents today status post A-fib and atrial flutter ablation no recurrence.  [id number] score 2.  Feels well.  No chest pain or shortness of breath no palpitations.    Past Medical History:   Diagnosis Date    Allergy     Anesthesia     WAKES UP SLOWLY     hypotensive episode 2015    Arrhythmia     Arthritis     osteoarthritis in joints \"knees especially\"    Blood transfusion without reported diagnosis     Cancer (HCC) 1999    skin basal and squamous    Cataract     IOL bilateral    Delayed emergence from general anesthesia     Hyperlipidemia     Pain     knee pain    Sensitive to concentration of IV drug     and oral drugs    Thyroid activity decreased     takes suppliment    Transfusion history 1974    hemorrage after D&C    Unspecified hemorrhagic conditions 1980s    from taking too much aspirin     Past Surgical History:   Procedure Laterality Date    OK TENOTOMY PERC TOE MULTIPLE TENDONS Bilateral 03/06/2023    Procedure: LEFT THIRD AND FOURTH TOE FLEXOR TENOTOMY, RIGHT THIRD, FOURTH AND FIFTH TOE FLEXOR TENOTOMY;  Surgeon: Noel Mansfield M.D.;  Location: [facility];  Service: Orthopedics    PB REPAIR OF HAMMERTOE,ONE Bilateral 03/06/2023    Procedure: LEFT DISTAL INTERPHALANGEAL JOINT FUSION, RIGHT THIRD AND FOURTH DISTAL INTERPHALANGEAL JOINT FUSION,  RIGHT FIFTH PROXIMAL INTERPHALANGEAL FUSION, RIGHT DISTAL PHALANX EXOSECTOMY;  Surgeon: Noel Mansfield M.D.;  Location: [facility];  Service: Orthopedics    KNEE ARTHROPLASTY TOTAL Right 06/27/2018    Procedure: KNEE ARTHROPLASTY TOTAL;  Surgeon: Sindi Ochoa M.D.;  Location: [facility];  Service: Orthopedics    KNEE ARTHROSCOPY Right 12/28/2017    x 2    KNEE ARTHROSCOPY Right 12/28/2017    Procedure: RT " KNEE ARTHROSCOPY, Chondroplasty, Partial Meniscectomy;  Surgeon: Jaswant Velez M.D.;  Location: [facility];  Service: Orthopedics    CATARACT EXTRACTION WITH IOL Bilateral 2016    PTOSIS REPAIR  03/31/2015    Performed by Alfredo Marcus M.D. at [facility]    HYSTERECTOMY, TOTAL ABDOMINAL  1992    APPENDECTOMY  1992    CARPAL TUNNEL RELEASE Bilateral 1991    BUNIONECTOMY Bilateral 1988    ABDOMINAL HYSTERECTOMY TOTAL      CHOLECYSTECTOMY      DILATION AND CURETTAGE      EYE SURGERY      [facility]    [facility] SURGERY  12/2011    ORIF, KNEE  06/2018    Total knee replace     Family History   Problem Relation Age of Onset    Heart Disease Mother     Hypertension Mother     Hyperlipidemia Mother     Stroke Mother     Lung Disease Father     Cancer Father      Social History     Socioeconomic History    Marital status:      Spouse name: Not on file    Number of children: Not on file    Years of education: Not on file    Highest education level: Some college, no degree   Occupational History    Not on file   Tobacco Use    Smoking status: Never     Passive exposure: Past    Smokeless tobacco: Never   Vaping Use    Vaping status: Never Used   Substance and Sexual Activity    Alcohol use: Yes     Alcohol/week: 0.6 oz     Types: 1 Glasses of wine per week     Comment: wine with some meals    Drug use: No    Sexual activity: Not Currently     Partners: Male     Birth control/protection: Surgical, Post-Menopausal     Comment: KEISHA in 1992   Other Topics Concern    Not on file   Social History Narrative    Not on file     Social Drivers of Health     Financial Resource Strain: Not on file   Food Insecurity: No Food Insecurity (2/9/2024)    Hunger Vital Sign     Worried About Running Out of Food in the Last Year: Never true     Ran Out of Food in the Last Year: Never true   Transportation Needs: No Transportation Needs (2/9/2024)    PRAPARE - Transportation      "Lack of Transportation (Medical): No     Lack of Transportation (Non-Medical): No   Physical Activity: Sufficiently Active (2/9/2024)    Exercise Vital Sign     Days of Exercise per Week: 7 days     Minutes of Exercise per Session: 30 min   Stress: No Stress Concern Present (2/9/2024)    [employer] - Occupational Stress Questionnaire     Feeling of Stress : Not at all   Social Connections: Unknown (2/9/2024)    Social Connection and Isolation Panel [NHANES]     Frequency of Communication with Friends and Family: More than three times a week     Frequency of Social Gatherings with Friends and Family: More than three times a week     Attends [employer] Services: Patient declined     Active Member of Clubs or Organizations: No     Attends Club or Organization Meetings: Never     Marital Status:    Intimate Partner Violence: Not on file   Housing Stability: Low Risk  (2/9/2024)    Housing Stability Vital Sign     Unable to Pay for Housing in the Last Year: No     Number of Places Lived in the Last Year: 1     Unstable Housing in the Last Year: No     Allergies   Allergen Reactions    Gabapentin Palpitations    Kenalog Rash     INJECTION  CAUSES VERTIGO / THRUSH  Other reaction(s): Flushing    Penicillins Hives and Rash     HIVES    HIVES    Triamcinolone Nausea, Palpitations and Rash     Other reaction(s): Dizziness, Flushing  INJECTION  CAUSES VERTIGO / THRUSH- kenolog  Other reaction(s): Dizziness, Flushing, flushing  INJECTION  CAUSES VERTIGO / THRUSH- kenolog      Doxycycline Unspecified and Rash     \"Hurts to swallow\"  Other reaction(s): Other  Other reaction(s): .Unknown, other, Unknown  Esophagus swelling      Morphine Nausea and Vomiting     EXTREME VOMITING  Other reaction(s): GI Upset  EXTREME VOMITING    Sulfa Drugs      EXTREME MUCOUS PRODUCTION  Other reaction(s): Rhinitis  EXTREME MUCOUS PRODUCTION  Other reaction(s): Not available  Other reaction(s): Unknown    " Penicillin G      Other reaction(s): Unknown     Outpatient Encounter Medications as of 11/4/2024   Medication Sig Dispense Refill    montelukast (SINGULAIR) 10 MG Tab Take 10 mg by mouth.      Resveratrol 250 MG Cap 250 mg.      meloxicam (MOBIC) 15 MG tablet 1 tablet PO daily with food. No advil/aleve/motrin/ibuprofen on same day. 60 Tablet 1    COLLAGEN PO Collagen      Coenzyme Q10 (COQ10 PO) CoQ10      B Complex-Folic Acid (SUPER B COMPLEX MAXI PO) as directed Orally      Calcium-Vitamin D-Vitamin K (CALCIUM + D) 500-1000-40 MG-UNT-MCG Chew Tab as directed Orally      estradiol (ESTRACE) 0.5 MG tablet Take 1 Tablet by mouth every day.      MegaRed Omega-3 Krill Oil 500 MG Cap as directed Orally      loratadine (CLARITIN) 10 MG Tab Take 5 mg by mouth every day.      rosuvastatin (CRESTOR) 5 MG Tab Take 1 Tablet by mouth every day.      Turmeric 500 MG Cap as directed Orally      Multiple Vitamins-Minerals (PRESERVISION AREDS 2) Cap as directed Orally      levothyroxine (SYNTHROID) 50 MCG Tab 1 TABLET IN THE MORNING ON AN EMPTY STOMACH ORALLY ONCE A DAY      Nutritional Supplements (JOINT FORMULA PO) Take 1 Tab by mouth every day.      ascorbic acid (ASCORBIC ACID) 500 MG TABS Take 1,000 mg by mouth every day.      Non Formulary Request Take 1 Cap by mouth every day. EYE VITAMIN      Probiotic Product (PROBIOTIC DAILY PO) Take 1 Cap by mouth every day.      Non Formulary Request Take 1 Cap by mouth every day. VITAMIN D + CALCIUM      apixaban (ELIQUIS) 5mg Tab Take 1 Tablet by mouth 2 times a day.      [DISCONTINUED] hydrocortisone 2.5 % Cream topical cream Apply 1 Application topically.      clindamycin (CLEOCIN) 150 MG Cap       [DISCONTINUED] hydrocortisone 2.5 % Cream topical cream PLEASE SEE ATTACHED FOR DETAILED DIRECTIONS      [DISCONTINUED] ketoconazole (NIZORAL) 2 % shampoo        No facility-administered encounter medications on file as of 11/4/2024.     ROS           Objective     /62 (BP  "Location: Left arm, Patient Position: Sitting, BP Cuff Size: Adult)   Pulse 73   Resp 14   Ht 1.6 m (5' 3\")   Wt 67.1 kg (148 lb)   SpO2 98%   BMI 26.22 kg/m²     Physical Exam  Constitutional:       Appearance: She is well-developed.   HENT:      Head: Normocephalic and atraumatic.   Eyes:      Pupils: Pupils are equal, round, and reactive to light.   Cardiovascular:      Rate and Rhythm: Normal rate and regular rhythm.      Heart sounds: Normal heart sounds. No murmur heard.     No friction rub. No gallop.   Pulmonary:      Effort: Pulmonary effort is normal.      Breath sounds: Normal breath sounds.   Abdominal:      General: Bowel sounds are normal.      Palpations: Abdomen is soft.   Musculoskeletal:         General: Normal range of motion.      Cervical back: Normal range of motion and neck supple.   Skin:     General: Skin is warm.   Neurological:      Mental Status: She is alert and oriented to person, place, and time.      Cranial Nerves: No cranial nerve deficit.   Psychiatric:         Behavior: Behavior normal.         Thought Content: Thought content normal.         Judgment: Judgment normal.                Assessment & Plan     1. PAF (paroxysmal atrial fibrillation) ([facility])  EKG      2. S/P ablation of atrial fibrillation/flutter        3. Mixed hyperlipidemia            Medical Decision Making: Today's Assessment/Status/Plan:   1.  Atrial arrhythmia status post ablation good results  2.  Hyperlipidemia on statin.  3.  Follow-up with me in 1 year                     "

## 2025-03-24 NOTE — PROCEDURE: REASSURANCE
Problem: Patient Stated Problem: Lack of energy and stamina  Goal: Patient Stated Goal: Pt would like to have increased energy over the next 3-6 months/short term goal  Outcome: Progressing     Problem: Knowledge deficit of hypertension  Goal: Demonstrate Knowledge of Hypertension/long term goal  Outcome: Progressing     Problem: Knowledge deficit of factors contributing to hypertension  Goal: Demonstrate factors that can contribute to high blood pressure/long term goal  Outcome: Progressing  
Include Location In Plan?: No
Detail Level: Generalized
Detail Level: Detailed

## (undated) DEVICE — MASK ANESTHESIA ADULT  - (100/CA)

## (undated) DEVICE — ELECTRODE 850 FOAM ADHESIVE - HYDROGEL RADIOTRNSPRNT (50/PK)

## (undated) DEVICE — Device

## (undated) DEVICE — BLADE SAGITTAL 34MM

## (undated) DEVICE — BAG, SPONGE COUNT 50600

## (undated) DEVICE — TOURNIQUET CUFF 34 X 4 ONE PORT DISP - STERILE (10/BX)

## (undated) DEVICE — KIT ANESTHESIA W/CIRCUIT & 3/LT BAG W/FILTER (20EA/CA)

## (undated) DEVICE — SENSOR SPO2 NEO LNCS ADHESIVE (20/BX) SEE USER NOTES

## (undated) DEVICE — SUTURE 2-0 VICRYL PLUS CT-1 - 8 X 18 INCH(12/BX)

## (undated) DEVICE — SUTURE 1 VICRYL PLUS CTX - 8 X 18 INCH (12/BX)

## (undated) DEVICE — WATER IRRIGATION STERILE 1000ML (12EA/CA)

## (undated) DEVICE — PROTECTOR ULNA NERVE - (36PR/CA)

## (undated) DEVICE — TUBE CONNECTING SUCTION - CLEAR PLASTIC STERILE 72 IN (50EA/CA)

## (undated) DEVICE — MASK, LARYNGEAL AIRWAY #4

## (undated) DEVICE — CANISTER SUCTION RIGID RED 1500CC (40EA/CA)

## (undated) DEVICE — SUCTION INSTRUMENT YANKAUER BULBOUS TIP W/O VENT (50EA/CA)

## (undated) DEVICE — GLOVE BIOGEL INDICATOR SZ 8 SURGICAL PF LTX - (50/BX 4BX/CA)

## (undated) DEVICE — BLOCK

## (undated) DEVICE — DRESSING AQUACEL AG ADVANTAGE 3.5 X 10" (10EA/BX)"

## (undated) DEVICE — NEPTUNE 4 PORT MANIFOLD - (20/PK)

## (undated) DEVICE — HANDPIECE 10FT INTPLS SCT PLS IRRIGATION HAND CONTROL SET (6/PK)

## (undated) DEVICE — GLOVE BIOGEL INDICATOR SZ 7.5 SURGICAL PF LTX - (50PR/BX 4BX/CA)

## (undated) DEVICE — HUMID-VENT HEAT AND MOISTURE EXCHANGE- (50/BX)

## (undated) DEVICE — SODIUM CHL. IRRIGATION 0.9% 3000ML (4EA/CA 65CA/PF)

## (undated) DEVICE — GLOVE BIOGEL SZ 7 SURGICAL PF LTX - (50PR/BX 4BX/CA)

## (undated) DEVICE — BANDAGE ELASTIC STERILE VELCRO 6 X 5 YDS (25EA/CA)

## (undated) DEVICE — HEAD HOLDER JUNIOR/ADULT

## (undated) DEVICE — BANDAGE ELASTIC 6 IN X 5 YDS - LATEX FREE (10/BX)

## (undated) DEVICE — KIT ROOM DECONTAMINATION

## (undated) DEVICE — GLOVE BIOGEL SZ 7.5 SURGICAL PF LTX - (50PR/BX 4BX/CA)

## (undated) DEVICE — MIXER BONE CEMENT REVOLUTION - W/FEMORAL PRESSURIZER (6/CA)

## (undated) DEVICE — GOWN WARMING STANDARD FLEX - (30/CA)

## (undated) DEVICE — GLOVE BIOGEL INDICATOR SZ 7SURGICAL PF LTX - (50/BX 4BX/CA)

## (undated) DEVICE — ELECTRODE DUAL RETURN W/ CORD - (50/PK)

## (undated) DEVICE — LENS/HOOD FOR SPACESUIT - (32/PK) PEEL AWAY FACE

## (undated) DEVICE — PAD UNIVERSAL MULTI USE (1/EA)

## (undated) DEVICE — SODIUM CHL IRRIGATION 0.9% 1000ML (12EA/CA)

## (undated) DEVICE — BLADE SAGITTAL SYSTEM 18MM

## (undated) DEVICE — NEEDLE W/FACET TIP DULL VERSION W/STIMULATION CABLE SONOPLEX 21G X 4 (10/EA)"

## (undated) DEVICE — SUTURE GENERAL

## (undated) DEVICE — TIP INTPLS HFLO ML ORFC BTRY - (12/CS)  FOR SURGILAV

## (undated) DEVICE — GLOVE, LITE (PAIR)

## (undated) DEVICE — GLOVE BIOGEL SZ 8 SURGICAL PF LTX - (50PR/BX 4BX/CA)

## (undated) DEVICE — SUTURE 3-0 MONOCRYL PLUS PS-1 - 27 INCH (36/BX)

## (undated) DEVICE — DRESSING POST OP BORDER 4 X 10 (5EA/BX)

## (undated) DEVICE — STOCKINETTE IMPERVIOUS 12X48 - STERILELF (10/CA)"

## (undated) DEVICE — PACK TOTAL KNEE  (1/CA)

## (undated) DEVICE — CHLORAPREP 26 ML APPLICATOR - ORANGE TINT(25/CA)

## (undated) DEVICE — LACTATED RINGERS INJ 1000 ML - (14EA/CA 60CA/PF)